# Patient Record
Sex: FEMALE | Race: WHITE | NOT HISPANIC OR LATINO | Employment: OTHER | URBAN - METROPOLITAN AREA
[De-identification: names, ages, dates, MRNs, and addresses within clinical notes are randomized per-mention and may not be internally consistent; named-entity substitution may affect disease eponyms.]

---

## 2017-10-09 ENCOUNTER — HOSPITAL ENCOUNTER (OUTPATIENT)
Dept: MAMMOGRAPHY | Facility: HOSPITAL | Age: 67
Discharge: HOME | End: 2017-10-09

## 2017-10-09 DIAGNOSIS — Z12.31 VISIT FOR SCREENING MAMMOGRAM: ICD-10-CM

## 2017-10-09 LAB — MAMMOGRAM: NORMAL

## 2018-05-09 ENCOUNTER — OFFICE VISIT (OUTPATIENT)
Dept: ORTHOPEDICS | Facility: CLINIC | Age: 68
End: 2018-05-09
Payer: COMMERCIAL

## 2018-05-09 ENCOUNTER — RADIANT APPOINTMENT (OUTPATIENT)
Dept: GENERAL RADIOLOGY | Facility: CLINIC | Age: 68
End: 2018-05-09
Attending: FAMILY MEDICINE
Payer: COMMERCIAL

## 2018-05-09 VITALS — WEIGHT: 175 LBS | BODY MASS INDEX: 33.04 KG/M2 | HEIGHT: 61 IN

## 2018-05-09 DIAGNOSIS — M54.50 CHRONIC MIDLINE LOW BACK PAIN WITHOUT SCIATICA: Primary | ICD-10-CM

## 2018-05-09 DIAGNOSIS — M54.50 CHRONIC MIDLINE LOW BACK PAIN WITHOUT SCIATICA: ICD-10-CM

## 2018-05-09 DIAGNOSIS — G89.29 CHRONIC MIDLINE LOW BACK PAIN WITHOUT SCIATICA: Primary | ICD-10-CM

## 2018-05-09 DIAGNOSIS — G89.29 CHRONIC MIDLINE LOW BACK PAIN WITHOUT SCIATICA: ICD-10-CM

## 2018-05-09 RX ORDER — HYDROCODONE BITARTRATE AND ACETAMINOPHEN 5; 325 MG/1; MG/1
2 TABLET ORAL EVERY 6 HOURS PRN
Qty: 30 TABLET | Refills: 0 | Status: SHIPPED | OUTPATIENT
Start: 2018-05-09 | End: 2019-07-09

## 2018-05-09 RX ORDER — DICLOFENAC SODIUM 75 MG/1
75 TABLET, DELAYED RELEASE ORAL 2 TIMES DAILY PRN
Qty: 90 TABLET | Refills: 1 | Status: SHIPPED | OUTPATIENT
Start: 2018-05-09 | End: 2018-09-20

## 2018-05-09 NOTE — MR AVS SNAPSHOT
"              After Visit Summary   5/9/2018    Renate Tanner    MRN: [id number]           Patient Information     Date Of Birth          1950        Visit Information        Provider Department      5/9/2018 1:20 PM Cathy Jaramillo MD [facility]        Today's Diagnoses     Chronic midline low back pain without sciatica    -  1       Follow-ups after your visit        Who to contact     Please call your clinic at [phone] to:    Ask questions about your health    Make or cancel appointments    Discuss your medicines    Learn about your test results    Speak to your doctor            Additional Information About Your Visit        MyChart Information     [employer] gives you secure access to your electronic health record. If you see a primary care provider, you can also send messages to your care team and make appointments. If you have questions, please call your primary care clinic.  If you do not have a primary care provider, please call [phone] and they will assist you.      [employer] is an electronic gateway that provides easy, online access to your medical records. With [employer], you can request a clinic appointment, read your test results, renew a prescription or communicate with your care team.     To access your existing account, please contact your [facility] or call [phone] for assistance.        Care EveryWhere ID     This is your Care EveryWhere ID. This could be used by other organizations to access your [place] medical records  [id number]        Your Vitals Were     Height BMI (Body Mass Index)                5' 1\" (1.549 m) 33.07 kg/m2           Blood Pressure from Last 3 Encounters:   06/09/15 133/86    Weight from Last 3 Encounters:   05/09/18 175 lb (79.4 kg)   11/28/16 175 lb (79.4 kg)   06/09/15 175 lb (79.4 kg)                 Today's Medication Changes          These changes are accurate as of 5/9/18 11:59 PM.  If you have " any questions, ask your nurse or doctor.               Start taking these medicines.        Dose/Directions    diclofenac 75 MG EC tablet   Commonly known as:  VOLTAREN   Used for:  Chronic midline low back pain without sciatica   Started by:  Cathy Jaramillo MD        Dose:  75 mg   Take 1 tablet (75 mg) by mouth 2 times daily as needed for moderate pain   Quantity:  90 tablet   Refills:  1       HYDROcodone-acetaminophen 5-325 MG per tablet   Commonly known as:  NORCO   Used for:  Chronic midline low back pain without sciatica   Started by:  Cathy Jaramillo MD        Dose:  2 tablet   Take 2 tablets by mouth every 6 hours as needed for severe pain   Quantity:  30 tablet   Refills:  0         Stop taking these medicines if you haven't already. Please contact your care team if you have questions.     meloxicam 7.5 MG tablet   Commonly known as:  MOBIC   Stopped by:  Cathy Jaramillo MD                Where to get your medicines      These medications were sent to [facility] [address] [place], [place] [address] [facility]  [address] [facility] [address]     Phone:  [phone]     diclofenac 75 MG EC tablet         Some of these will need a paper prescription and others can be bought over the counter.  Ask your nurse if you have questions.     Bring a paper prescription for each of these medications     HYDROcodone-acetaminophen 5-325 MG per tablet               Information about OPIOIDS     PRESCRIPTION OPIOIDS: WHAT YOU NEED TO KNOW   You have a prescription for an opioid (narcotic) pain medicine. Opioids can cause addiction. If you have a history of chemical dependency of any type, you are at a higher risk of becoming addicted to opioids. Only take this medicine after all other options have been tried. Take it for as short a time and as few doses as possible.     Do not:    Drive. If you drive while taking these medicines, you could be arrested for driving under the  influence (DUI).    Operate heavy machinery    Do any other dangerous activities while taking these medicines.     Drink any alcohol while taking these medicines.      Take with any other medicines that contain acetaminophen. Read all labels carefully. Look for the word  acetaminophen  or  Tylenol.  Ask your pharmacist if you have questions or are unsure.    Store your pills in a secure place, locked if possible. We will not replace any lost or stolen medicine. If you don t finish your medicine, please throw away (dispose) as directed by your pharmacist. The [place] Pollution Control Agency has more information about safe disposal: https://www.pca.[facility].[place].us/living-green/managing-unwanted-medications    All opioids tend to cause constipation. Drink plenty of water and eat foods that have a lot of fiber, such as fruits, vegetables, prune juice, apple juice and high-fiber cereal. Take a laxative (Miralax, milk of magnesia, Colace, Senna) if you don t move your bowels at least every other day.          Primary Care Provider    None Specified       No primary provider on file.        Equal Access to Services     BERNARD MORTON : Hadii jaya driver hadasho Sopascualali, waaxda luqadaha, qaybta kaalmada adetaurusyajluis, asuncion dykes . So [facility] [phone].    ATENCIÓN: Si habla español, tiene a block disposición servicios gratuitos de asistencia lingüística. Llame al [phone].    We comply with applicable federal civil rights laws and [place] laws. We do not discriminate on the basis of race, color, national origin, age, disability, sex, sexual orientation, or gender identity.            Thank you!     Thank you for choosing [facility]  for your care. Our goal is always to provide you with excellent care. Hearing back from our patients is one way we can continue to improve our services. Please take a few minutes to complete the written survey that you may receive in the mail after your visit  with us. Thank you!             Your Updated Medication List - Protect others around you: Learn how to safely use, store and throw away your medicines at www.disposemymeds.org.          This list is accurate as of 5/9/18 11:59 PM.  Always use your most recent med list.                   Brand Name Dispense Instructions for use Diagnosis    ALLEGRA ALLERGY 180 MG tablet   Generic drug:  fexofenadine      Take 180 mg by mouth daily        ascorbic acid 1000 MG Tabs    vitamin C     Take 1,000 mg by mouth daily        aspirin 81 MG tablet      Take by mouth daily        atenolol 50 MG tablet    TENORMIN     Take 50 mg by mouth daily        Biotin 5000 MCG Tabs           CALCIUM 600+D PLUS MINERALS PO      Take 1 tablet by mouth daily        cyclobenzaprine 10 MG tablet    FLEXERIL    42 tablet    Take 1 tablet (10 mg) by mouth 3 times daily as needed for muscle spasms    Disc disease, degenerative, lumbar or lumbosacral       diclofenac 75 MG EC tablet    VOLTAREN    90 tablet    Take 1 tablet (75 mg) by mouth 2 times daily as needed for moderate pain    Chronic midline low back pain without sciatica       fish Oil 1200 MG capsule      360 mg omega-3's        FLUTICASONE FUROATE IN           GLUCOSAMINE-CHONDROITIN PO      1500-1200mg        HYDROcodone-acetaminophen 5-325 MG per tablet    NORCO    30 tablet    Take 2 tablets by mouth every 6 hours as needed for severe pain    Chronic midline low back pain without sciatica       IRON PO      Take 1 tablet by mouth every other day        METRONIDAZOLE EX      Externally apply topically daily        MULTIVITAMINS PO      Take 1 tablet by mouth daily        traZODone 50 MG tablet    DESYREL

## 2018-05-09 NOTE — PROGRESS NOTES
"HPI: SUBJECTIVE:  Renate is a very active 67-year-old female patient with a longstanding history of low back pain due to DDD.  I haven't seen her since 11/2016 .  She has had years of low back pain.  She underwent a spinal fusion at L3-4 on 08/11/2011.  This helped her pain some but did not take away the numbness that radiates to her right leg and knee.  Overall, her back seems to be worsening since we saw her last.  She used Mobic for a while but that didn't help as much as she would like.  She has been to PT and that was helpful.  She does her best to continue to be active.  Ibuprofen helps some.  She has also tried Celebrex and flexeril in the past without benefit.   KRISHNA;  CDI July 2015; minimal benefit by Dr. Leung   Hard to stand still or in line    Current Outpatient Prescriptions   Medication     ascorbic acid (VITAMIN C) 1000 MG TABS     aspirin 81 MG tablet     atenolol (TENORMIN) 50 MG tablet     Biotin 5000 MCG TABS     Calcium Carbonate-Vit D-Min (CALCIUM 600+D PLUS MINERALS PO)     cyclobenzaprine (FLEXERIL) 10 MG tablet     diclofenac (VOLTAREN) 75 MG EC tablet     fexofenadine (ALLEGRA ALLERGY) 180 MG tablet     FLUTICASONE FUROATE IN     GLUCOSAMINE-CHONDROITIN PO     HYDROcodone-acetaminophen (NORCO) 5-325 MG per tablet     IRON PO     METRONIDAZOLE EX     Multiple Vitamin (MULTIVITAMINS PO)     Omega-3 Fatty Acids (FISH OIL) 1200 MG CAPS     traZODone (DESYREL) 50 MG tablet     No current facility-administered medications for this visit.          PAST MEDICAL HISTORY:  Hypertension  Seasonal allergies  Surg; tubal  L3-4 fusion 2011  SOCIAL HISTORY:  She is , and she lives alone. She is fully retired    O: NAD  Ht 5' 1\" (1.549 m)  Wt 175 lb (79.4 kg)  BMI 33.07 kg/m2    OBJECTIVE:  The patient is accompanied today by her daughter, Frida.  She tends to have a rounded shoulder posture, some of this is correctible with postural cueing.     Lumbar spine:  She is able to flex to about 45 degrees " before she experiences pain.  She has a well-healed surgical scar posteriorly on the lumbar spine.  Extension is limited and it comes from about the thoracic spine upward.  She really does not move the lower lumbar segment.  Side bending is limited but is pain free.  She does have pain with extension and flexion.  She is tender to palpation over her paraspinal muscles, right greater than left.  She is tender over her SI joints bilaterally, negative sacral thrust.  Negative SHARLA testing bilaterally.  Negative straight leg raise testing, negative slump testing.  Sciatic notch is nontender to palpation.  She does have decreased sensation along the lateral aspect of the right thigh.  Otherwise, her sensation is intact.  She is able heel and toe walk without difficulty.     Reflexes:  Patellar reflexes are 1+ with distraction maneuvers and equal bilaterally.  Ankle jerks are 2+ and equal bilaterally.         2 views lumbar spine radiographs     History: Low back pain     Comparison: Outside MRI from June 22, 2015.     Findings:     EXAMINATION: XR LUMBAR SPINE G/E 4 VW  5/9/2018 2:19 PM      CLINICAL HISTORY:  Chronic low back; AP, lateral and obliques; Chronic  midline low back pain without sciatica; Chronic midline low back pain  without sciatica      COMPARISON: 11/28/2016     FINDINGS: AP, lateral and oblique views of the lumbar spine were  obtained. For the purpose of this dictation 5 lumbar type vertebral  bodies are presumed. There are short ribs at the lowest thoracic  vertebral body level. Redemonstration of the combined anterior and  posterior instrumentation involving the L3-L4 vertebral body level.  Unchanged mild retrolisthesis of L2 on L3 and L4 on L5. Multilevel  disc space narrowing most pronounced at the level of L1-L2, L2-L3 and  L5-S1. There is mild progression of disc space narrowing.     No definite evidence of pars defect.         IMPRESSION:   1. Stable postsurgical changes of L3-L4 combined  anterior and  posterior instrumented fusion.  2. Mildly progressed disc space narrowing and early vacuum disc  phenomenon most pronounced at the levels of L1-L2, L2-L3 and L5-S1.  3. Persistent very mild retrolisthesis of L2 on L3 and L4 on L5.     JUTTA ELLERMANN, MD    ASSESSMENT:  This is very active and pleasant 67-year-old female with an L3-4 fusion done in 2011 that appears stable but who shows severe degenerative disk disease at L1-3 and more mild changes at L5-S1 but the DDD has progressed since her last xrays in 2016.         PLAN:  We have discussed her x-ray findings.    Today, I would like to prescribe her Voltaren 75 mg 1 p.o. b.i.d. with meals p.r.n.  Avoid other NSAIDS while on this medication.  Discussed side effects.  She also requested a small prescription of Norco for when her symptoms are particularly bad.  I have prescribed 5mg Norco #30. This medication has helped her in the past and she would use it sparingly.  I have reminded her of sedation and other side effects.  She will restart her PT exercises. Consider MRI of the lumbar spine and possible KRISHNA if not improving. She would like to avoid having a new MRI and any injections if possible.      Cathy Jaramillo MD, CAQ, FACSM, CCD  [facility]  Sports Medicine and Bone Health  Team Physician;  Athletics

## 2018-05-09 NOTE — LETTER
"  5/9/2018      RE: Renate Tanner  [address] [facility] [address]       HPI: SUBJECTIVE:  Renate is a very active 67-year-old female patient with a longstanding history of low back pain due to DDD.  I haven't seen her since 11/2016 .  She has had years of low back pain.  She underwent a spinal fusion at L3-4 on 08/11/2011.  This helped her pain some but did not take away the numbness that radiates to her right leg and knee.  Overall, her back seems to be worsening since we saw her last.  She used Mobic for a while but that didn't help as much as she would like.  She has been to PT and that was helpful.  She does her best to continue to be active.  Ibuprofen helps some.  She has also tried Celebrex and flexeril in the past without benefit.   KRISHNA;  CDI July 2015; minimal benefit by Dr. Leung   Hard to stand still or in line    Current Outpatient Prescriptions   Medication     ascorbic acid (VITAMIN C) 1000 MG TABS     aspirin 81 MG tablet     atenolol (TENORMIN) 50 MG tablet     Biotin 5000 MCG TABS     Calcium Carbonate-Vit D-Min (CALCIUM 600+D PLUS MINERALS PO)     cyclobenzaprine (FLEXERIL) 10 MG tablet     diclofenac (VOLTAREN) 75 MG EC tablet     fexofenadine (ALLEGRA ALLERGY) 180 MG tablet     FLUTICASONE FUROATE IN     GLUCOSAMINE-CHONDROITIN PO     HYDROcodone-acetaminophen (NORCO) 5-325 MG per tablet     IRON PO     METRONIDAZOLE EX     Multiple Vitamin (MULTIVITAMINS PO)     Omega-3 Fatty Acids (FISH OIL) 1200 MG CAPS     traZODone (DESYREL) 50 MG tablet     No current facility-administered medications for this visit.          PAST MEDICAL HISTORY:  Hypertension  Seasonal allergies  Surg; tubal  L3-4 fusion 2011  SOCIAL HISTORY:  She is , and she lives alone. She is fully retired    O: NAD  Ht 5' 1\" (1.549 m)  Wt 175 lb (79.4 kg)  BMI 33.07 kg/m2    OBJECTIVE:  The patient is accompanied today by her daughter, Frida.  She tends to have a rounded shoulder posture, some of this is " correctible with postural cueing.     Lumbar spine:  She is able to flex to about 45 degrees before she experiences pain.  She has a well-healed surgical scar posteriorly on the lumbar spine.  Extension is limited and it comes from about the thoracic spine upward.  She really does not move the lower lumbar segment.  Side bending is limited but is pain free.  She does have pain with extension and flexion.  She is tender to palpation over her paraspinal muscles, right greater than left.  She is tender over her SI joints bilaterally, negative sacral thrust.  Negative SHARLA testing bilaterally.  Negative straight leg raise testing, negative slump testing.  Sciatic notch is nontender to palpation.  She does have decreased sensation along the lateral aspect of the right thigh.  Otherwise, her sensation is intact.  She is able heel and toe walk without difficulty.     Reflexes:  Patellar reflexes are 1+ with distraction maneuvers and equal bilaterally.  Ankle jerks are 2+ and equal bilaterally.         2 views lumbar spine radiographs     History: Low back pain     Comparison: Outside MRI from June 22, 2015.     Findings:     EXAMINATION: XR LUMBAR SPINE G/E 4 VW  5/9/2018 2:19 PM      CLINICAL HISTORY:  Chronic low back; AP, lateral and obliques; Chronic  midline low back pain without sciatica; Chronic midline low back pain  without sciatica      COMPARISON: 11/28/2016     FINDINGS: AP, lateral and oblique views of the lumbar spine were  obtained. For the purpose of this dictation 5 lumbar type vertebral  bodies are presumed. There are short ribs at the lowest thoracic  vertebral body level. Redemonstration of the combined anterior and  posterior instrumentation involving the L3-L4 vertebral body level.  Unchanged mild retrolisthesis of L2 on L3 and L4 on L5. Multilevel  disc space narrowing most pronounced at the level of L1-L2, L2-L3 and  L5-S1. There is mild progression of disc space narrowing.     No definite evidence  of pars defect.         IMPRESSION:   1. Stable postsurgical changes of L3-L4 combined anterior and  posterior instrumented fusion.  2. Mildly progressed disc space narrowing and early vacuum disc  phenomenon most pronounced at the levels of L1-L2, L2-L3 and L5-S1.  3. Persistent very mild retrolisthesis of L2 on L3 and L4 on L5.     JUTTA ELLERMANN, MD    ASSESSMENT:  This is very active and pleasant 67-year-old female with an L3-4 fusion done in 2011 that appears stable but who shows severe degenerative disk disease at L1-3 and more mild changes at L5-S1 but the DDD has progressed since her last xrays in 2016.         PLAN:  We have discussed her x-ray findings.    Today, I would like to prescribe her Voltaren 75 mg 1 p.o. b.i.d. with meals p.r.n.  Avoid other NSAIDS while on this medication.  Discussed side effects.  She also requested a small prescription of Norco for when her symptoms are particularly bad.  I have prescribed 5mg Norco #30. This medication has helped her in the past and she would use it sparingly.  I have reminded her of sedation and other side effects.  She will restart her PT exercises. Consider MRI of the lumbar spine and possible KRISHNA if not improving. She would like to avoid having a new MRI and any injections if possible.      Cathy Jaramillo MD, CAQ, FACSM, CCD  [facility]  Sports Medicine and Bone Health  Team Physician;  Athletics                                                                 Cathy Jaramillo MD

## 2018-09-17 DIAGNOSIS — M54.50 CHRONIC MIDLINE LOW BACK PAIN WITHOUT SCIATICA: ICD-10-CM

## 2018-09-17 DIAGNOSIS — G89.29 CHRONIC MIDLINE LOW BACK PAIN WITHOUT SCIATICA: ICD-10-CM

## 2018-09-17 RX ORDER — DICLOFENAC SODIUM 75 MG/1
75 TABLET, DELAYED RELEASE ORAL 2 TIMES DAILY PRN
Qty: 90 TABLET | Refills: 1 | Status: CANCELLED | OUTPATIENT
Start: 2018-09-17

## 2018-09-17 RX ORDER — HYDROCODONE BITARTRATE AND ACETAMINOPHEN 5; 325 MG/1; MG/1
2 TABLET ORAL EVERY 6 HOURS PRN
Qty: 30 TABLET | Refills: 0 | Status: CANCELLED | OUTPATIENT
Start: 2018-09-17

## 2018-09-17 NOTE — TELEPHONE ENCOUNTER
diclofenac (VOLTAREN) 75 MG EC tablet      Last Written Prescription Date:  5/9/18  Last Fill Quantity: 90,   # refills: 1  Routing refill request to provider for review/approval because:  Drug not on the Ortho refill protocol.    HYDROcodone-acetaminophen (NORCO) 5-325 MG per tablet      Last Written Prescription Date:  5/9/18  Last Fill Quantity: 30,   # refills: 0  Routing refill request to provider for review/approval because:  Controlled substance.    Last Office Visit : 5/9/18  Future Office visit:  none

## 2018-09-20 DIAGNOSIS — M54.50 CHRONIC MIDLINE LOW BACK PAIN WITHOUT SCIATICA: ICD-10-CM

## 2018-09-20 DIAGNOSIS — G89.29 CHRONIC MIDLINE LOW BACK PAIN WITHOUT SCIATICA: ICD-10-CM

## 2018-09-20 RX ORDER — DICLOFENAC SODIUM 75 MG/1
75 TABLET, DELAYED RELEASE ORAL 2 TIMES DAILY PRN
Qty: 90 TABLET | Refills: 1 | Status: SHIPPED | OUTPATIENT
Start: 2018-09-20 | End: 2019-07-09

## 2018-09-27 NOTE — TELEPHONE ENCOUNTER
Renate Tanner   to Cathy Jaramillo MD           9/20/18 4:00 PM   Thank you for getting back to me. I will wait to hear from [facility] re: the diclofenac. I understand on the other. I will make an appointment with you when I get back from my trip.   Thanks again-   Renate Jaramillo, Cathy Thomas MD   to Renate Tanner           9/20/18 3:14 PM   Hi Renate,   You will have to come in for an appointment for me to prescribe more Norco since it is a narcotic.  I will refill the Diclofenac.       Cathy Jaramillo MD, CAQ, FACSM, CCD   [facility]   Sports Medicine and Bone Health   Team Physician;  Athletics      Last read by Renate Tanner at 3:57 PM on 9/20/2018.

## 2018-11-05 ENCOUNTER — PATIENT OUTREACH (OUTPATIENT)
Dept: CARE COORDINATION | Facility: CLINIC | Age: 68
End: 2018-11-05

## 2018-11-07 ENCOUNTER — OFFICE VISIT (OUTPATIENT)
Dept: ORTHOPEDICS | Facility: CLINIC | Age: 68
End: 2018-11-07
Payer: COMMERCIAL

## 2018-11-07 VITALS — WEIGHT: 178.3 LBS | HEIGHT: 61 IN | BODY MASS INDEX: 33.66 KG/M2 | RESPIRATION RATE: 16 BRPM

## 2018-11-07 DIAGNOSIS — M51.369 LUMBAR DEGENERATIVE DISC DISEASE: Primary | ICD-10-CM

## 2018-11-07 RX ORDER — HYDROCODONE BITARTRATE AND ACETAMINOPHEN 5; 325 MG/1; MG/1
2 TABLET ORAL EVERY 4 HOURS PRN
Qty: 30 TABLET | Refills: 0 | Status: SHIPPED | OUTPATIENT
Start: 2018-11-07 | End: 2023-09-05

## 2018-11-07 NOTE — PROGRESS NOTES
SUBJECTIVE:      Interval Hx: 68 yo female here for f/u of multilevel lumbar DDD. Here symptoms are worsening and she has painful Right lateral thigh pain without significant numbness or tingling.  Denies radiation past her knee.  Her LBP is increasing.  She had a previous L3-4 fusion in 2011 and thinks maybe she should have another fusion.  Requesting a refill of a small amount of Norco.  I gave her a prescription for #30 in May 2018.  Using diclofenac EC 75 mg and this helps some.  She called her surgeon who indicated that he would need her to have a MRI with contrast prior to seeing her.       Previous Hx from May 2018 appt:  Renate is a very active 67-year-old female patient with a longstanding history of low back pain due to DDD.  I haven't seen her since 11/2016 .  She has had years of low back pain.  She underwent a spinal fusion at L3-4 on 08/11/2011.  This helped her pain some but did not take away the numbness that radiates to her right leg and knee.  Overall, her back seems to be worsening since we saw her last.  She used Mobic for a while but that didn't help as much as she would like.  She has been to PT and that was helpful.  She does her best to continue to be active.  Ibuprofen helps some.  She has also tried Celebrex and flexeril in the past without benefit.   KRISHNA;  CDI July 2015 R L4 Transforminal per the note on the CDI website; minimal benefit by Dr. Leung   Hard to stand still or in line  Current Outpatient Prescriptions   Medication     ascorbic acid (VITAMIN C) 1000 MG TABS     aspirin 81 MG tablet     atenolol (TENORMIN) 50 MG tablet     Biotin 5000 MCG TABS     Calcium Carbonate-Vit D-Min (CALCIUM 600+D PLUS MINERALS PO)     diclofenac (VOLTAREN) 75 MG EC tablet     fexofenadine (ALLEGRA ALLERGY) 180 MG tablet     FLUTICASONE FUROATE IN     GLUCOSAMINE-CHONDROITIN PO     HYDROcodone-acetaminophen (NORCO) 5-325 MG per tablet     HYDROcodone-acetaminophen (NORCO) 5-325 MG per tablet      METRONIDAZOLE EX     Multiple Vitamin (MULTIVITAMINS PO)     Omega-3 Fatty Acids (FISH OIL) 1200 MG CAPS     traZODone (DESYREL) 50 MG tablet     cyclobenzaprine (FLEXERIL) 10 MG tablet     IRON PO     No current facility-administered medications for this visit.      O: NAD  There were no vitals taken for this visit.    OBJECTIVE:  The patient is accompanied today by her daughter, Frida.    Lumbar spine:  She is able to flex to about 45 degrees before she experiences pain.  She has a well-healed surgical scar posteriorly on the lumbar spine.  Extension is limited and it comes from about the thoracic spine upward.  She really does not move the lower lumbar segment.  Side bending is limited but is pain free.  She does have pain with extension and flexion.  She is nontender to palpation over her paraspinal muscles. + Slump on the RIGHT causing the RIGHT lateral thigh pain.  She has some mild RIGHT S1 weakness compared to the LEFT otherwise her strength in the B LE is 5/5. She can rise up on her toes and and lift up her feet to be on her heels bilaterally with some balance assistance.     ASSESSMENT:  This is very active and pleasant 67-year-old female with an L3-4 fusion done in 2011 that appears stable but who shows severe degenerative disk disease at L1-3 and moderate changes at L5-S1 with increasing symptoms of R lateral thigh radicular leg pain and R S1 weakness.      PLAN:  She has been taking diclofenac which helps and doesn't cause side effects.  She also requested a small prescription of Norco for when her symptoms are particularly bad.  I have prescribed 5mg Norco #30. This medication has helped her in the past and she would use it sparingly.  After lengthy discussion and reviewing her MRI and her symptoms and exam in detail, I have recommended an interlaminar RIGHT L5-S1 KRISHNA.  She will let me know if that helps 2 weeks following the injection.  If not, we will have her get an RIGHT L3-4 interlaminar KRISHNA and  if that doesn't help she will return to her spine surgeon, who wants a MRI with contrast prior to seeing her. We have discussed that she would likely need a multi-level fusion procedure given the results of her latest MRIs. She is trying to get these things done before the end of the year due to an insurance change.     > 25 min of total time spent in one-on-one evalution and discussion with patient regarding nature of problem, course, prior treatments, and therapeutic options,> 50% of which was spent in counseling and coordination of care:       Cathy Jaramillo MD, CAQ, FACSM, CCD  [facility]  Sports Medicine and Bone Health  Team Physician;  Athletics

## 2018-11-07 NOTE — LETTER
11/7/2018      RE: Renate Tanner  [address] [address]       SUBJECTIVE:      Interval Hx: 68 yo female here for f/u of multilevel lumbar DDD. Here symptoms are worsening and she has painful Right lateral thigh pain without significant numbness or tingling.  Denies radiation past her knee.  Her LBP is increasing.  She had a previous L3-4 fusion in 2011 and thinks maybe she should have another fusion.  Requesting a refill of a small amount of Norco.  I gave her a prescription for #30 in May 2018.  Using diclofenac EC 75 mg and this helps some.  She called her surgeon who indicated that he would need her to have a MRI with contrast prior to seeing her.       Previous Hx from May 2018 appt:  Renate is a very active 67-year-old female patient with a longstanding history of low back pain due to DDD.  I haven't seen her since 11/2016 .  She has had years of low back pain.  She underwent a spinal fusion at L3-4 on 08/11/2011.  This helped her pain some but did not take away the numbness that radiates to her right leg and knee.  Overall, her back seems to be worsening since we saw her last.  She used Mobic for a while but that didn't help as much as she would like.  She has been to PT and that was helpful.  She does her best to continue to be active.  Ibuprofen helps some.  She has also tried Celebrex and flexeril in the past without benefit.   KRISHNA;  CDI July 2015 R L4 Transforminal per the note on the CDI website; minimal benefit by Dr. Leung   Hard to stand still or in line  Current Outpatient Prescriptions   Medication     ascorbic acid (VITAMIN C) 1000 MG TABS     aspirin 81 MG tablet     atenolol (TENORMIN) 50 MG tablet     Biotin 5000 MCG TABS     Calcium Carbonate-Vit D-Min (CALCIUM 600+D PLUS MINERALS PO)     diclofenac (VOLTAREN) 75 MG EC tablet     fexofenadine (ALLEGRA ALLERGY) 180 MG tablet     FLUTICASONE FUROATE IN     GLUCOSAMINE-CHONDROITIN PO     HYDROcodone-acetaminophen (NORCO)  5-325 MG per tablet     HYDROcodone-acetaminophen (NORCO) 5-325 MG per tablet     METRONIDAZOLE EX     Multiple Vitamin (MULTIVITAMINS PO)     Omega-3 Fatty Acids (FISH OIL) 1200 MG CAPS     traZODone (DESYREL) 50 MG tablet     cyclobenzaprine (FLEXERIL) 10 MG tablet     IRON PO     No current facility-administered medications for this visit.      O: NAD  There were no vitals taken for this visit.    OBJECTIVE:  The patient is accompanied today by her daughter, Frida.    Lumbar spine:  She is able to flex to about 45 degrees before she experiences pain.  She has a well-healed surgical scar posteriorly on the lumbar spine.  Extension is limited and it comes from about the thoracic spine upward.  She really does not move the lower lumbar segment.  Side bending is limited but is pain free.  She does have pain with extension and flexion.  She is nontender to palpation over her paraspinal muscles. + Slump on the RIGHT causing the RIGHT lateral thigh pain.  She has some mild RIGHT S1 weakness compared to the LEFT otherwise her strength in the B LE is 5/5. She can rise up on her toes and and lift up her feet to be on her heels bilaterally with some balance assistance.     ASSESSMENT:  This is very active and pleasant 67-year-old female with an L3-4 fusion done in 2011 that appears stable but who shows severe degenerative disk disease at L1-3 and moderate changes at L5-S1 with increasing symptoms of R lateral thigh radicular leg pain and R S1 weakness.      PLAN:  She has been taking diclofenac which helps and doesn't cause side effects.  She also requested a small prescription of Norco for when her symptoms are particularly bad.  I have prescribed 5mg Norco #30. This medication has helped her in the past and she would use it sparingly.  After lengthy discussion and reviewing her MRI and her symptoms and exam in detail, I have recommended an interlaminar RIGHT L5-S1 KRISHNA.  She will let me know if that helps 2 weeks  following the injection.  If not, we will have her get an RIGHT L3-4 interlaminar KRISHNA and if that doesn't help she will return to her spine surgeon, who wants a MRI with contrast prior to seeing her. We have discussed that she would likely need a multi-level fusion procedure given the results of her latest MRIs. She is trying to get these things done before the end of the year due to an insurance change.     > 25 min of total time spent in one-on-one evalution and discussion with patient regarding nature of problem, course, prior treatments, and therapeutic options,> 50% of which was spent in counseling and coordination of care:       Cathy Jaramillo MD, CAQ, FACSM, CCD  [facility]  Sports Medicine and Bone Health  Team Physician;  Athletics

## 2018-11-07 NOTE — MR AVS SNAPSHOT
After Visit Summary   11/7/2018    Renate Tanner    MRN: [id number]           Patient Information     Date Of Birth          1950        Visit Information        Provider Department      11/7/2018 1:20 PM Cathy Jaramillo MD [facility] Sports Medicine        Today's Diagnoses     Lumbar degenerative disc disease    -  1       Follow-ups after your visit        Your next 10 appointments already scheduled     Nov 09, 2018  1:00 PM CST   XR LUMBAR EPIDURAL INJECTION with UCXR3,  IMAGING NURSE, BRIT NEURO RAD   [facility] ([facility])    [address] [place] [id number] [phone]           How do I prepare for my exam? (Food and drink instructions) No Food and Drink Restrictions.  How do I prepare for my exam? (Other instructions) * If you take Coumadin (or any other blood thinners) you may need to stop taking them up to 5 days prior to the exam. Talk to your doctor before stopping. * If you take Plavix, Ticlid, Pletal or Persantine, please ask your doctor if you should stop these medicines. You may need extra tests on the morning of your scan. * If you take Xarelto (Rivaroxaban), you may need to stop taking it 24 hours before treatment. Talk to your doctor before stopping this medicine.  What should I wear: Wear comfortable clothes.  How long does the exam take: Injections take about 30 to 45 minutes. Most people spend up to 2 hours in the clinic or hospital.  What should I bring: Please bring any scans or X-rays taken at other hospitals, if similar tests were done. Also bring a list of your medicines, including vitamins, minerals and over-the-counter drugs. It is safest to leave personal items at home. You will need a  : Plan to have someone drive you home afterward.  What do I need to tell my doctor: Tell your doctor in advance: * If you are allergic to X-ray dye (contrast fluid). * If you may be pregnant.   What should I do after the exam: You may have slight cramping during this exam. The cramps should go away afterward. You may have some spotting after the exam.  What is this test: A spinal shot is done in or near the spine. You may receive steroid medicine (to reduce swelling) or contrast fluid (dye that makes the area show up more clearly on X-rays). A nerve root shot is a shot into the nerve near the spine. It may reduce inflammation near the nerve root or spinal cord and reduce pain in the arm or leg.  Who should I call with questions: If you have any questions, please call the Imaging Department where you will have your exam. Directions, parking instructions, and other information is available on our website, [email or web address].              Future tests that were ordered for you today     Open Future Orders        Priority Expected Expires Ordered    XR Lumbar Epidural Injection Incl Imaging Routine 11/7/2018 11/7/2019 11/7/2018            Who to contact     Please call your clinic at [phone] to:    Ask questions about your health    Make or cancel appointments    Discuss your medicines    Learn about your test results    Speak to your doctor            Additional Information About Your Visit        [facility] Information     [employer] gives you secure access to your electronic health record. If you see a primary care provider, you can also send messages to your care team and make appointments. If you have questions, please call your primary care clinic.  If you do not have a primary care provider, please call [phone] and they will assist you.      [employer] is an electronic gateway that provides easy, online access to your medical records. With [employer], you can request a clinic appointment, read your test results, renew a prescription or communicate with your care team.     To access your existing account, please contact your [facility] or call [phone] for assistance.    "     Care EveryWhere ID     This is your Care EveryWhere ID. This could be used by other organizations to access your [place] medical records  [id number]        Your Vitals Were     Respirations Height BMI (Body Mass Index)             16 1.549 m (5' 1\") 33.69 kg/m2          Blood Pressure from Last 3 Encounters:   06/09/15 133/86    Weight from Last 3 Encounters:   11/07/18 80.9 kg (178 lb 4.8 oz)   05/09/18 79.4 kg (175 lb)   11/28/16 79.4 kg (175 lb)                 Today's Medication Changes          These changes are accurate as of 11/7/18  3:17 PM.  If you have any questions, ask your nurse or doctor.               These medicines have changed or have updated prescriptions.        Dose/Directions    * HYDROcodone-acetaminophen 5-325 MG per tablet   Commonly known as:  NORCO   This may have changed:  Another medication with the same name was added. Make sure you understand how and when to take each.   Used for:  Chronic midline low back pain without sciatica   Changed by:  Cathy Jaramillo MD        Dose:  2 tablet   Take 2 tablets by mouth every 6 hours as needed for severe pain   Quantity:  30 tablet   Refills:  0       * HYDROcodone-acetaminophen 5-325 MG per tablet   Commonly known as:  NORCO   This may have changed:  You were already taking a medication with the same name, and this prescription was added. Make sure you understand how and when to take each.   Used for:  Lumbar degenerative disc disease   Changed by:  Cathy Jaramillo MD        Dose:  2 tablet   Take 2 tablets by mouth every 4 hours as needed for pain   Quantity:  30 tablet   Refills:  0       * Notice:  This list has 2 medication(s) that are the same as other medications prescribed for you. Read the directions carefully, and ask your doctor or other care provider to review them with you.         Where to get your medicines      Some of these will need a paper prescription and others can be bought over the " counter.  Ask your nurse if you have questions.     Bring a paper prescription for each of these medications     HYDROcodone-acetaminophen 5-325 MG per tablet               Information about OPIOIDS     PRESCRIPTION OPIOIDS: WHAT YOU NEED TO KNOW   We gave you an opioid (narcotic) pain medicine. It is important to manage your pain, but opioids are not always the best choice. You should first try all the other options your care team gave you. Take this medicine for as short a time (and as few doses) as possible.    Some activities can increase your pain, such as bandage changes or therapy sessions. It may help to take your pain medicine 30 to 60 minutes before these activities. Reduce your stress by getting enough sleep, working on hobbies you enjoy and practicing relaxation or meditation. Talk to your care team about ways to manage your pain beyond prescription opioids.    These medicines have risks:    DO NOT drive when on new or higher doses of pain medicine. These medicines can affect your alertness and reaction times, and you could be arrested for driving under the influence (DUI). If you need to use opioids long-term, talk to your care team about driving.    DO NOT operate heavy machinery    DO NOT do any other dangerous activities while taking these medicines.    DO NOT drink any alcohol while taking these medicines.     If the opioid prescribed includes acetaminophen, DO NOT take with any other medicines that contain acetaminophen. Read all labels carefully. Look for the word  acetaminophen  or  Tylenol.  Ask your pharmacist if you have questions or are unsure.    You can get addicted to pain medicines, especially if you have a history of addiction (chemical, alcohol or substance dependence). Talk to your care team about ways to reduce this risk.    All opioids tend to cause constipation. Drink plenty of water and eat foods that have a lot of fiber, such as fruits, vegetables, prune juice, apple juice and  high-fiber cereal. Take a laxative (Miralax, milk of magnesia, Colace, Senna) if you don t move your bowels at least every other day. Other side effects include upset stomach, sleepiness, dizziness, throwing up, tolerance (needing more of the medicine to have the same effect), physical dependence and slowed breathing.    Store your pills in a secure place, locked if possible. We will not replace any lost or stolen medicine. If you don t finish your medicine, please throw away (dispose) as directed by your pharmacist. The [place] Pollution Control Agency has more information about safe disposal: [email or web address]         Primary Care Provider    None Specified       No primary provider on file.        Equal Access to Services     BERNARD MORTON : Bertram Serrano, edilia manuel, allie lau, asuncion dykes . So [facility] [phone].    ATENCIÓN: Si habla español, tiene a block disposición servicios gratuitos de asistencia lingüística. Llame al [phone].    We comply with applicable federal civil rights laws and [place] laws. We do not discriminate on the basis of race, color, national origin, age, disability, sex, sexual orientation, or gender identity.            Thank you!     Thank you for choosing [facility]  for your care. Our goal is always to provide you with excellent care. Hearing back from our patients is one way we can continue to improve our services. Please take a few minutes to complete the written survey that you may receive in the mail after your visit with us. Thank you!             Your Updated Medication List - Protect others around you: Learn how to safely use, store and throw away your medicines at www.disposemymeds.org.          This list is accurate as of 11/7/18  3:17 PM.  Always use your most recent med list.                   Brand Name Dispense Instructions for use  Diagnosis    ALLEGRA ALLERGY 180 MG tablet   Generic drug:  fexofenadine      Take 180 mg by mouth daily        ascorbic acid 1000 MG Tabs    vitamin C     Take 1,000 mg by mouth daily        aspirin 81 MG tablet      Take by mouth daily        atenolol 50 MG tablet    TENORMIN     Take 50 mg by mouth daily        Biotin 5000 MCG Tabs           CALCIUM 600+D PLUS MINERALS PO      Take 1 tablet by mouth daily        cyclobenzaprine 10 MG tablet    FLEXERIL    42 tablet    Take 1 tablet (10 mg) by mouth 3 times daily as needed for muscle spasms    Disc disease, degenerative, lumbar or lumbosacral       diclofenac 75 MG EC tablet    VOLTAREN    90 tablet    Take 1 tablet (75 mg) by mouth 2 times daily as needed for moderate pain    Chronic midline low back pain without sciatica       fish Oil 1200 MG capsule      360 mg omega-3's        FLUTICASONE FUROATE IN           GLUCOSAMINE-CHONDROITIN PO      1500-1200mg        * HYDROcodone-acetaminophen 5-325 MG per tablet    NORCO    30 tablet    Take 2 tablets by mouth every 6 hours as needed for severe pain    Chronic midline low back pain without sciatica       * HYDROcodone-acetaminophen 5-325 MG per tablet    NORCO    30 tablet    Take 2 tablets by mouth every 4 hours as needed for pain    Lumbar degenerative disc disease       IRON PO      Take 1 tablet by mouth every other day        METRONIDAZOLE EX      Externally apply topically daily        MULTIVITAMINS PO      Take 1 tablet by mouth daily        traZODone 50 MG tablet    DESYREL          * Notice:  This list has 2 medication(s) that are the same as other medications prescribed for you. Read the directions carefully, and ask your doctor or other care provider to review them with you.

## 2018-11-09 ENCOUNTER — RADIANT APPOINTMENT (OUTPATIENT)
Dept: GENERAL RADIOLOGY | Facility: CLINIC | Age: 68
End: 2018-11-09
Attending: FAMILY MEDICINE
Payer: COMMERCIAL

## 2018-11-09 VITALS
RESPIRATION RATE: 16 BRPM | TEMPERATURE: 98.4 F | HEART RATE: 64 BPM | DIASTOLIC BLOOD PRESSURE: 95 MMHG | SYSTOLIC BLOOD PRESSURE: 170 MMHG | OXYGEN SATURATION: 98 %

## 2018-11-09 DIAGNOSIS — M51.369 LUMBAR DEGENERATIVE DISC DISEASE: ICD-10-CM

## 2018-11-09 RX ORDER — LIDOCAINE HYDROCHLORIDE 10 MG/ML
30 INJECTION, SOLUTION EPIDURAL; INFILTRATION; INTRACAUDAL; PERINEURAL ONCE
Status: COMPLETED | OUTPATIENT
Start: 2018-11-09 | End: 2018-11-09

## 2018-11-09 RX ORDER — METHYLPREDNISOLONE ACETATE 80 MG/ML
80 INJECTION, SUSPENSION INTRA-ARTICULAR; INTRALESIONAL; INTRAMUSCULAR; SOFT TISSUE ONCE
Status: COMPLETED | OUTPATIENT
Start: 2018-11-09 | End: 2018-11-09

## 2018-11-09 RX ORDER — IOPAMIDOL 408 MG/ML
20 INJECTION, SOLUTION INTRATHECAL ONCE
Status: COMPLETED | OUTPATIENT
Start: 2018-11-09 | End: 2018-11-09

## 2018-11-09 RX ORDER — BUPIVACAINE HYDROCHLORIDE 5 MG/ML
10 INJECTION, SOLUTION EPIDURAL; INTRACAUDAL ONCE
Status: COMPLETED | OUTPATIENT
Start: 2018-11-09 | End: 2018-11-09

## 2018-11-09 RX ADMIN — METHYLPREDNISOLONE ACETATE 80 MG: 80 INJECTION, SUSPENSION INTRA-ARTICULAR; INTRALESIONAL; INTRAMUSCULAR; SOFT TISSUE at 13:25

## 2018-11-09 RX ADMIN — IOPAMIDOL 4 ML: 408 INJECTION, SOLUTION INTRATHECAL at 13:25

## 2018-11-09 RX ADMIN — BUPIVACAINE HYDROCHLORIDE 10 MG: 5 INJECTION, SOLUTION EPIDURAL; INTRACAUDAL at 13:25

## 2018-11-09 RX ADMIN — LIDOCAINE HYDROCHLORIDE 5 ML: 10 INJECTION, SOLUTION EPIDURAL; INFILTRATION; INTRACAUDAL; PERINEURAL at 13:25

## 2018-11-09 NOTE — MR AVS SNAPSHOT
MRN:[id number]                      After Visit Summary   11/9/2018    Renate Tanner    MRN: [id number]           Visit Information        Provider Department      11/9/2018 1:00 PM  NEURO RAD;  IMAGING NURSE; XR3 [facility] Xray           Review of your medicines          These changes are accurate as of 11/9/18  1:30 PM.  If you have any questions, ask your nurse or doctor.               CONTINUE these medicines which have NOT CHANGED        Dose / Directions    ALLEGRA ALLERGY 180 MG tablet   Generic drug:  fexofenadine        Dose:  180 mg   Take 180 mg by mouth daily   Refills:  0       ascorbic acid 1000 MG Tabs   Commonly known as:  vitamin C        Dose:  1000 mg   Take 1,000 mg by mouth daily   Refills:  0       aspirin 81 MG tablet        Take by mouth daily   Refills:  0       atenolol 50 MG tablet   Commonly known as:  TENORMIN        Dose:  50 mg   Take 50 mg by mouth daily   Refills:  0       Biotin 5000 MCG Tabs        Refills:  0       CALCIUM 600+D PLUS MINERALS PO        Dose:  1 tablet   Take 1 tablet by mouth daily   Refills:  0       cyclobenzaprine 10 MG tablet   Commonly known as:  FLEXERIL   Used for:  Disc disease, degenerative, lumbar or lumbosacral        Dose:  10 mg   Take 1 tablet (10 mg) by mouth 3 times daily as needed for muscle spasms   Quantity:  42 tablet   Refills:  0       diclofenac 75 MG EC tablet   Commonly known as:  VOLTAREN   Used for:  Chronic midline low back pain without sciatica        Dose:  75 mg   Take 1 tablet (75 mg) by mouth 2 times daily as needed for moderate pain   Quantity:  90 tablet   Refills:  1       fish Oil 1200 MG capsule        360 mg omega-3's   Refills:  0       FLUTICASONE FUROATE IN        Refills:  0       GLUCOSAMINE-CHONDROITIN PO        1500-1200mg   Refills:  0       * HYDROcodone-acetaminophen 5-325 MG per tablet   Commonly known as:  NORCO   Used for:  Chronic midline low back pain without sciatica         Dose:  2 tablet   Take 2 tablets by mouth every 6 hours as needed for severe pain   Quantity:  30 tablet   Refills:  0       * HYDROcodone-acetaminophen 5-325 MG per tablet   Commonly known as:  NORCO   Used for:  Lumbar degenerative disc disease        Dose:  2 tablet   Take 2 tablets by mouth every 4 hours as needed for pain   Quantity:  30 tablet   Refills:  0       IRON PO        Dose:  1 tablet   Take 1 tablet by mouth every other day   Refills:  0       METRONIDAZOLE EX        Externally apply topically daily   Refills:  0       MULTIVITAMINS PO        Dose:  1 tablet   Take 1 tablet by mouth daily   Refills:  0       traZODone 50 MG tablet   Commonly known as:  DESYREL        Refills:  0       * Notice:  This list has 2 medication(s) that are the same as other medications prescribed for you. Read the directions carefully, and ask your doctor or other care provider to review them with you.             Protect others around you: Learn how to safely use, store and throw away your medicines at www.disposemymeds.org.         Follow-ups after your visit         Care Instructions        Further instructions from your care team       Discharge Instructions for Epidural Steroid Injection/Nerve Block    Care of injection site:    If you have new numbness down your leg after the injection, this may last up to 4-6 hours, but should go away. You may need help with walking until your leg feels normal.    Over the next 24 to 48 hours, pain at the injection site may increase before it gets better.    For the next 48 hours, use ice packs for 15 minutes,3-4 times a day for pain relief.    If you have a bandage, you may remove it the next morning     Do not submerge injection site in water for 24 hours, (no baths. pools, hot tubs). Showers are OK.            Activity:    You should relax today and then you may return to your regular activity tomorrow.    You may eat a normal diet.    Medicines:    Restart all your medicines  tomorrow at your regular dose, including Coumadin (Warfarin).    If you are restarting Coumadin, talk to your doctor about having your INR checked.    If you received steroids: The steroid should help reduce swelling and pain. This may take from 3-14 days. Pain from the shot will be mild and go away in 2-3 days.     Common side effects may include:    Insomnia    Heartburn    Flushed face    Water retention    Increased appetite    Increased blood sugar      If you have diabetes, watch your blood sugar closely, If needed, call your doctor to help control your blood sugar.    Some patients will get lasting relief from a single injection. Others may require additional injections to get results.     Call your doctor or go to the Emergency Room if you have new severe pain or fever.     Additional Information About Your Visit        [employer] Information     [employer] gives you secure access to your electronic health record. If you see a primary care provider, you can also send messages to your care team and make appointments. If you have questions, please call your primary care clinic.  If you do not have a primary care provider, please call [phone] and they will assist you.      [employer] is an electronic gateway that provides easy, online access to your medical records. With [employer], you can request a clinic appointment, read your test results, renew a prescription or communicate with your care team.     To access your existing account, please contact your [facility] or call [phone] for assistance.        Care EveryWhere ID     This is your Care EveryWhere ID. This could be used by other organizations to access your [place] medical records  [id number]        Your Vitals Were     Blood Pressure Pulse Temperature Respirations Pulse Oximetry       157/93 64 98.4  F (36.9  C) (Oral) 16 98%        Primary Care Provider    None Specified      Equal Access to Services     BERNARD MTZ:  Hadii aad ku hadshwetao Sopascualali, waaxda luqadaha, qaybta kaalmada judi, asuncion ginagarrison barronsophy triston. So [facility] [phone].    ATENCIÓN: Si promisela lissy, tiene a block disposición servicios gratuitos de asistencia lingüística. Llame al [phone].    We comply with applicable federal civil rights laws and [place] laws. We do not discriminate on the basis of race, color, national origin, age, disability, sex, sexual orientation, or gender identity.            Thank you!     Thank you for choosing [facility] for your care. Our goal is always to provide you with excellent care. Hearing back from our patients is one way we can continue to improve our services. Please take a few minutes to complete the written survey that you may receive in the mail after you visit with us. Thank you!             Medication List: This is a list of all your medications and when to take them. Check marks below indicate your daily home schedule. Keep this list as a reference.          This list is accurate as of 11/9/18  1:30 PM.  Always use your most recent med list.               Medications           Morning Afternoon Evening Bedtime As Needed    ALLEGRA ALLERGY 180 MG tablet   Take 180 mg by mouth daily   Generic drug:  fexofenadine                                ascorbic acid 1000 MG Tabs   Commonly known as:  vitamin C   Take 1,000 mg by mouth daily                                aspirin 81 MG tablet   Take by mouth daily                                atenolol 50 MG tablet   Commonly known as:  TENORMIN   Take 50 mg by mouth daily                                Biotin 5000 MCG Tabs                                CALCIUM 600+D PLUS MINERALS PO   Take 1 tablet by mouth daily                                cyclobenzaprine 10 MG tablet   Commonly known as:  FLEXERIL   Take 1 tablet (10 mg) by mouth 3 times daily as needed for muscle spasms                                diclofenac 75 MG EC tablet   Commonly known as:   VOLTAREN   Take 1 tablet (75 mg) by mouth 2 times daily as needed for moderate pain                                fish Oil 1200 MG capsule   360 mg omega-3's                                FLUTICASONE FUROATE IN                                GLUCOSAMINE-CHONDROITIN PO   1500-1200mg                                * HYDROcodone-acetaminophen 5-325 MG per tablet   Commonly known as:  NORCO   Take 2 tablets by mouth every 6 hours as needed for severe pain                                * HYDROcodone-acetaminophen 5-325 MG per tablet   Commonly known as:  NORCO   Take 2 tablets by mouth every 4 hours as needed for pain                                IRON PO   Take 1 tablet by mouth every other day                                METRONIDAZOLE EX   Externally apply topically daily                                MULTIVITAMINS PO   Take 1 tablet by mouth daily                                traZODone 50 MG tablet   Commonly known as:  DESYREL                                * Notice:  This list has 2 medication(s) that are the same as other medications prescribed for you. Read the directions carefully, and ask your doctor or other care provider to review them with you.

## 2018-11-09 NOTE — PROGRESS NOTES
Pt did well with the procedure.  No complications. VSS. AVS given and pt escorted to the waiting room.    Adrianne, Imaging Nurse

## 2018-11-26 DIAGNOSIS — M51.369 DEGENERATIVE DISC DISEASE, LUMBAR: Primary | ICD-10-CM

## 2018-11-30 ENCOUNTER — HOSPITAL ENCOUNTER (OUTPATIENT)
Dept: MAMMOGRAPHY | Facility: CLINIC | Age: 68
Discharge: HOME | End: 2018-11-30
Attending: OBSTETRICS & GYNECOLOGY

## 2018-11-30 DIAGNOSIS — Z12.31 VISIT FOR SCREENING MAMMOGRAM: ICD-10-CM

## 2018-12-26 ENCOUNTER — ANCILLARY PROCEDURE (OUTPATIENT)
Dept: GENERAL RADIOLOGY | Facility: CLINIC | Age: 68
End: 2018-12-26
Attending: PEDIATRICS
Payer: COMMERCIAL

## 2018-12-26 VITALS
RESPIRATION RATE: 16 BRPM | SYSTOLIC BLOOD PRESSURE: 156 MMHG | DIASTOLIC BLOOD PRESSURE: 77 MMHG | OXYGEN SATURATION: 98 % | HEART RATE: 59 BPM | TEMPERATURE: 98.7 F

## 2018-12-26 DIAGNOSIS — M51.369 DEGENERATIVE DISC DISEASE, LUMBAR: ICD-10-CM

## 2018-12-26 RX ORDER — IOPAMIDOL 408 MG/ML
20 INJECTION, SOLUTION INTRATHECAL ONCE
Status: COMPLETED | OUTPATIENT
Start: 2018-12-26 | End: 2018-12-26

## 2018-12-26 RX ORDER — LIDOCAINE HYDROCHLORIDE 10 MG/ML
30 INJECTION, SOLUTION EPIDURAL; INFILTRATION; INTRACAUDAL; PERINEURAL ONCE
Status: COMPLETED | OUTPATIENT
Start: 2018-12-26 | End: 2018-12-26

## 2018-12-26 RX ORDER — METHYLPREDNISOLONE ACETATE 80 MG/ML
80 INJECTION, SUSPENSION INTRA-ARTICULAR; INTRALESIONAL; INTRAMUSCULAR; SOFT TISSUE ONCE
Status: COMPLETED | OUTPATIENT
Start: 2018-12-26 | End: 2018-12-26

## 2018-12-26 RX ORDER — BUPIVACAINE HYDROCHLORIDE 5 MG/ML
10 INJECTION, SOLUTION EPIDURAL; INTRACAUDAL ONCE
Status: COMPLETED | OUTPATIENT
Start: 2018-12-26 | End: 2018-12-26

## 2018-12-26 RX ADMIN — METHYLPREDNISOLONE ACETATE 80 MG: 80 INJECTION, SUSPENSION INTRA-ARTICULAR; INTRALESIONAL; INTRAMUSCULAR; SOFT TISSUE at 10:53

## 2018-12-26 RX ADMIN — IOPAMIDOL 10 ML: 408 INJECTION, SOLUTION INTRATHECAL at 10:53

## 2018-12-26 RX ADMIN — BUPIVACAINE HYDROCHLORIDE 15 MG: 5 INJECTION, SOLUTION EPIDURAL; INTRACAUDAL at 10:53

## 2018-12-26 RX ADMIN — LIDOCAINE HYDROCHLORIDE 5 ML: 10 INJECTION, SOLUTION EPIDURAL; INFILTRATION; INTRACAUDAL; PERINEURAL at 10:53

## 2019-07-02 NOTE — TELEPHONE ENCOUNTER
RECORDS RECEIVED FROM: Rt hip pain - No xrays but lumbar images in Epic - Per pt    DATE RECEIVED: 7/2   NOTES STATUS DETAILS   OFFICE NOTE from referring provider N/A    OFFICE NOTE from other specialist Internal    DISCHARGE SUMMARY from hospital N/A    DISCHARGE REPORT from the ER N/A    OPERATIVE REPORT N/A    MEDICATION LIST Internal    IMPLANT RECORD/STICKER N/A    LABS     CBC/DIFF N/A    CULTURES N/A    INJECTIONS DONE IN RADIOLOGY N/A    MRI Internal 2011 lumbar   CT SCAN N/A    XRAYS (IMAGES & REPORTS) Internal 2018, 2011 lumbar   TUMOR     PATHOLOGY  Slides & report N/A

## 2019-07-09 ENCOUNTER — ANCILLARY PROCEDURE (OUTPATIENT)
Dept: GENERAL RADIOLOGY | Facility: CLINIC | Age: 69
End: 2019-07-09
Payer: COMMERCIAL

## 2019-07-09 ENCOUNTER — OFFICE VISIT (OUTPATIENT)
Dept: ORTHOPEDICS | Facility: CLINIC | Age: 69
End: 2019-07-09
Payer: COMMERCIAL

## 2019-07-09 ENCOUNTER — ANCILLARY PROCEDURE (OUTPATIENT)
Dept: GENERAL RADIOLOGY | Facility: CLINIC | Age: 69
End: 2019-07-09
Attending: FAMILY MEDICINE
Payer: COMMERCIAL

## 2019-07-09 ENCOUNTER — PRE VISIT (OUTPATIENT)
Dept: ORTHOPEDICS | Facility: CLINIC | Age: 69
End: 2019-07-09

## 2019-07-09 VITALS
HEIGHT: 61 IN | DIASTOLIC BLOOD PRESSURE: 83 MMHG | HEART RATE: 60 BPM | BODY MASS INDEX: 32.55 KG/M2 | SYSTOLIC BLOOD PRESSURE: 141 MMHG | WEIGHT: 172.4 LBS

## 2019-07-09 DIAGNOSIS — M25.551 RIGHT HIP PAIN: ICD-10-CM

## 2019-07-09 DIAGNOSIS — M25.551 RIGHT HIP PAIN: Primary | ICD-10-CM

## 2019-07-09 DIAGNOSIS — M16.11 PRIMARY OSTEOARTHRITIS OF RIGHT HIP: ICD-10-CM

## 2019-07-09 DIAGNOSIS — M16.11 PRIMARY OSTEOARTHRITIS OF RIGHT HIP: Primary | ICD-10-CM

## 2019-07-09 RX ORDER — IOPAMIDOL 408 MG/ML
20 INJECTION, SOLUTION INTRATHECAL ONCE
Status: COMPLETED | OUTPATIENT
Start: 2019-07-09 | End: 2019-07-09

## 2019-07-09 RX ORDER — TRIAMCINOLONE ACETONIDE 40 MG/ML
40 INJECTION, SUSPENSION INTRA-ARTICULAR; INTRAMUSCULAR ONCE
Status: COMPLETED | OUTPATIENT
Start: 2019-07-09 | End: 2019-07-09

## 2019-07-09 RX ORDER — IBUPROFEN 600 MG/1
600 TABLET, FILM COATED ORAL EVERY 6 HOURS PRN
Qty: 300 TABLET | Refills: 3 | Status: SHIPPED | OUTPATIENT
Start: 2019-07-09 | End: 2023-09-05

## 2019-07-09 RX ORDER — BUPIVACAINE HYDROCHLORIDE 2.5 MG/ML
10 INJECTION, SOLUTION EPIDURAL; INFILTRATION; INTRACAUDAL ONCE
Status: COMPLETED | OUTPATIENT
Start: 2019-07-09 | End: 2019-07-09

## 2019-07-09 RX ORDER — LIDOCAINE HYDROCHLORIDE 10 MG/ML
30 INJECTION, SOLUTION EPIDURAL; INFILTRATION; INTRACAUDAL; PERINEURAL ONCE
Status: COMPLETED | OUTPATIENT
Start: 2019-07-09 | End: 2019-07-09

## 2019-07-09 RX ADMIN — IOPAMIDOL 2 ML: 408 INJECTION, SOLUTION INTRATHECAL at 14:54

## 2019-07-09 RX ADMIN — BUPIVACAINE HYDROCHLORIDE 2 ML: 2.5 INJECTION, SOLUTION EPIDURAL; INFILTRATION; INTRACAUDAL at 14:54

## 2019-07-09 RX ADMIN — LIDOCAINE HYDROCHLORIDE 5 ML: 10 INJECTION, SOLUTION EPIDURAL; INFILTRATION; INTRACAUDAL; PERINEURAL at 14:54

## 2019-07-09 RX ADMIN — TRIAMCINOLONE ACETONIDE 40 MG: 40 INJECTION, SUSPENSION INTRA-ARTICULAR; INTRAMUSCULAR at 14:54

## 2019-07-09 ASSESSMENT — MIFFLIN-ST. JEOR: SCORE: 1239.38

## 2019-07-09 NOTE — LETTER
"  7/9/2019      RE: Renate Tanner  [address] [id number]        Subjective:   Renate Tanner is a 68 year old female who presents with right anterior hip pain. The patient reports that her hip feels \"dislocated\" sometimes.  She notes that she is been having right groin discomfort for the past several months and this is been somewhat intermittent.  For the past 2 to 3 weeks it is now become more consistent and more sharp.  She states that she does not trust the hip when she is transitioning from a sitting to a standing position.  With the transition there is a sharp pain in the groin.  She has had no trauma to the hip.  She otherwise has known arthritis.  She has been taking Voltaren 75 mg either daily or twice daily but has noted that really has not been helping.  She has tried Celebrex and Mobic in the past with no relief.  She has tried acetaminophen with no relief.  She has tried naproxen with no relief.  She believes she did have relief with ibuprofen but she was having to take it multiple times per day and she now understands that she has to utilize some gastric protection based on her age.  She is not having any dysuria or history of peptic ulcer disease.  She is present today along with her daughter who is an employee in our radiology department.    Background:   Date of injury: None  Duration of symptoms: 2-3 weeks severe pain intermittently  Mechanism of Injury: Insidious Onset; Unknown   Intensity: Severe pain   Aggravating factors: Walking  Relieving Factors: Lumbar extension, hydrocodone, diclofenac  Prior Evaluation: None    PAST MEDICAL, SOCIAL, SURGICAL AND FAMILY HISTORY: She  has no past medical history on file.  She  has a past surgical history that includes IR Caudal Epidural Injection Single (12/26/2018).  Her family history is not on file.  She reports that she has quit smoking. She has never used smokeless tobacco.    ALLERGIES: She is allergic to seasonal " "allergies.    CURRENT MEDICATIONS: She has a current medication list which includes the following prescription(s): vitamin c, aspirin, atenolol, biotin, calcium carbonate-vit d-min, diclofenac, fexofenadine, fluticasone furoate, glucosamine-chondroitin, hydrocodone-acetaminophen, hydrocodone-acetaminophen, metronidazole, multiple vitamin, fish oil, trazodone, cyclobenzaprine, and iron.     REVIEW OF SYSTEMS: 10 point review of systems is negative except as noted above.     Exam:   /83 (BP Location: Right arm, Patient Position: Sitting, Cuff Size: Adult Large)   Pulse 60   Ht 1.549 m (5' 1\")   Wt 78.2 kg (172 lb 6.4 oz)   BMI 32.57 kg/m         CONSTITUTIONAL: healthy, alert and no distress  SKIN: no suspicious lesions or rashes  GAIT: broad based  NEUROLOGIC: Non-focal  PSYCHIATRIC: affect normal/bright and mentation appears normal.    MUSCULOSKELETAL:   RIGHT HIP: comprehensive examination demonstrates FROM with groin pain with full flexion and full IR, (+) FAdIR, (+) [place], (-) log roll. MMT of the hip demonstrates ability to actively flex, abduct, adduct and extend the hip.  She is mildly tender to palpation in the right anterior femoral acetabular articulation or femoral triangle and nontender over the greater trochanter.       Assessment/Plan:   (M16.11) Primary osteoarthritis of right hip  (primary encounter diagnosis)  Comment: Renate is a 68-year-old female with right hip osteoarthritis.  Her radiographs and exam are consistent.  We are going to proceed with a right hip intra-articular corticosteroid injection to try and get her some relief.  She has failed all nonsteroidals and will try return to ibuprofen at 600 mg p.o. every 6 hours as needed.  She is also going to start Prilosec at 20 mg p.o. daily and will take this daily.  She and her daughter have a supply of this at home.  If she fails ibuprofen then she could try Relafen or 1 of the salicylates such as Trilifate for pain relief.  Renate does " tell me she will be going to [place] in September and is possible she would need a repeat injection prior to her trip but also a consideration that she would need some [place] to take on her trip in the event that her hip becomes aggravated.  She is aware of this.  I have not given her a narcotic prescription today.  Plan: ibuprofen (ADVIL/MOTRIN) 600 MG tablet, XR         Joint Injection Major Right                  Los Neal MD

## 2019-07-09 NOTE — PROGRESS NOTES
" Subjective:   Renate Tanner is a 68 year old female who presents with right anterior hip pain. The patient reports that her hip feels \"dislocated\" sometimes.  She notes that she is been having right groin discomfort for the past several months and this is been somewhat intermittent.  For the past 2 to 3 weeks it is now become more consistent and more sharp.  She states that she does not trust the hip when she is transitioning from a sitting to a standing position.  With the transition there is a sharp pain in the groin.  She has had no trauma to the hip.  She otherwise has known arthritis.  She has been taking Voltaren 75 mg either daily or twice daily but has noted that really has not been helping.  She has tried Celebrex and Mobic in the past with no relief.  She has tried acetaminophen with no relief.  She has tried naproxen with no relief.  She believes she did have relief with ibuprofen but she was having to take it multiple times per day and she now understands that she has to utilize some gastric protection based on her age.  She is not having any dysuria or history of peptic ulcer disease.  She is present today along with her daughter who is an employee in our radiology department.    Background:   Date of injury: None  Duration of symptoms: 2-3 weeks severe pain intermittently  Mechanism of Injury: Insidious Onset; Unknown   Intensity: Severe pain   Aggravating factors: Walking  Relieving Factors: Lumbar extension, hydrocodone, diclofenac  Prior Evaluation: None    PAST MEDICAL, SOCIAL, SURGICAL AND FAMILY HISTORY: She  has no past medical history on file.  She  has a past surgical history that includes IR Caudal Epidural Injection Single (12/26/2018).  Her family history is not on file.  She reports that she has quit smoking. She has never used smokeless tobacco.    ALLERGIES: She is allergic to seasonal allergies.    CURRENT MEDICATIONS: She has a current medication list which includes the following " "prescription(s): vitamin c, aspirin, atenolol, biotin, calcium carbonate-vit d-min, diclofenac, fexofenadine, fluticasone furoate, glucosamine-chondroitin, hydrocodone-acetaminophen, hydrocodone-acetaminophen, metronidazole, multiple vitamin, fish oil, trazodone, cyclobenzaprine, and iron.     REVIEW OF SYSTEMS: 10 point review of systems is negative except as noted above.     Exam:   /83 (BP Location: Right arm, Patient Position: Sitting, Cuff Size: Adult Large)   Pulse 60   Ht 1.549 m (5' 1\")   Wt 78.2 kg (172 lb 6.4 oz)   BMI 32.57 kg/m        CONSTITUTIONAL: healthy, alert and no distress  SKIN: no suspicious lesions or rashes  GAIT: broad based  NEUROLOGIC: Non-focal  PSYCHIATRIC: affect normal/bright and mentation appears normal.    MUSCULOSKELETAL:   RIGHT HIP: comprehensive examination demonstrates FROM with groin pain with full flexion and full IR, (+) FAdIR, (+) [place], (-) log roll. MMT of the hip demonstrates ability to actively flex, abduct, adduct and extend the hip.  She is mildly tender to palpation in the right anterior femoral acetabular articulation or femoral triangle and nontender over the greater trochanter.       Assessment/Plan:   (M16.11) Primary osteoarthritis of right hip  (primary encounter diagnosis)  Comment: Renate is a 68-year-old female with right hip osteoarthritis.  Her radiographs and exam are consistent.  We are going to proceed with a right hip intra-articular corticosteroid injection to try and get her some relief.  She has failed all nonsteroidals and will try return to ibuprofen at 600 mg p.o. every 6 hours as needed.  She is also going to start Prilosec at 20 mg p.o. daily and will take this daily.  She and her daughter have a supply of this at home.  If she fails ibuprofen then she could try Relafen or 1 of the salicylates such as Trilifate for pain relief.  Renate does tell me she will be going to [place] in September and is possible she would need a repeat injection " prior to her trip but also a consideration that she would need some [place] to take on her trip in the event that her hip becomes aggravated.  She is aware of this.  I have not given her a narcotic prescription today.  Plan: ibuprofen (ADVIL/MOTRIN) 600 MG tablet, XR         Joint Injection Major Right

## 2019-12-02 ENCOUNTER — RECORDS - HEALTHEAST (OUTPATIENT)
Dept: MAMMOGRAPHY | Facility: CLINIC | Age: 69
End: 2019-12-02

## 2019-12-02 DIAGNOSIS — Z12.31 ENCOUNTER FOR SCREENING MAMMOGRAM FOR MALIGNANT NEOPLASM OF BREAST: ICD-10-CM

## 2020-03-01 ENCOUNTER — HEALTH MAINTENANCE LETTER (OUTPATIENT)
Age: 70
End: 2020-03-01

## 2020-12-09 ENCOUNTER — HOSPITAL ENCOUNTER (OUTPATIENT)
Dept: MAMMOGRAPHY | Facility: CLINIC | Age: 70
Discharge: HOME | End: 2020-12-09

## 2020-12-09 DIAGNOSIS — Z12.31 VISIT FOR SCREENING MAMMOGRAM: ICD-10-CM

## 2020-12-14 ENCOUNTER — HEALTH MAINTENANCE LETTER (OUTPATIENT)
Age: 70
End: 2020-12-14

## 2021-02-27 ENCOUNTER — HEALTH MAINTENANCE LETTER (OUTPATIENT)
Age: 71
End: 2021-02-27

## 2021-04-17 ENCOUNTER — HEALTH MAINTENANCE LETTER (OUTPATIENT)
Age: 71
End: 2021-04-17

## 2021-05-24 ENCOUNTER — RECORDS - HEALTHEAST (OUTPATIENT)
Dept: ADMINISTRATIVE | Facility: CLINIC | Age: 71
End: 2021-05-24

## 2021-05-25 ENCOUNTER — RECORDS - HEALTHEAST (OUTPATIENT)
Dept: ADMINISTRATIVE | Facility: CLINIC | Age: 71
End: 2021-05-25

## 2021-05-27 ENCOUNTER — RECORDS - HEALTHEAST (OUTPATIENT)
Dept: ADMINISTRATIVE | Facility: CLINIC | Age: 71
End: 2021-05-27

## 2021-05-28 ENCOUNTER — RECORDS - HEALTHEAST (OUTPATIENT)
Dept: ADMINISTRATIVE | Facility: CLINIC | Age: 71
End: 2021-05-28

## 2021-06-05 ENCOUNTER — RECORDS - HEALTHEAST (OUTPATIENT)
Dept: SCHEDULING | Facility: CLINIC | Age: 71
End: 2021-06-05

## 2021-06-05 DIAGNOSIS — N63.0 LUMP OR MASS IN BREAST: ICD-10-CM

## 2021-07-13 ENCOUNTER — RECORDS - HEALTHEAST (OUTPATIENT)
Dept: ADMINISTRATIVE | Facility: CLINIC | Age: 71
End: 2021-07-13

## 2021-07-21 ENCOUNTER — RECORDS - HEALTHEAST (OUTPATIENT)
Dept: ADMINISTRATIVE | Facility: CLINIC | Age: 71
End: 2021-07-21

## 2021-07-23 ENCOUNTER — RECORDS - HEALTHEAST (OUTPATIENT)
Dept: SCHEDULING | Facility: CLINIC | Age: 71
End: 2021-07-23

## 2021-07-23 DIAGNOSIS — Z12.31 OTHER SCREENING MAMMOGRAM: ICD-10-CM

## 2021-10-02 ENCOUNTER — HEALTH MAINTENANCE LETTER (OUTPATIENT)
Age: 71
End: 2021-10-02

## 2021-12-09 ENCOUNTER — ANCILLARY PROCEDURE (OUTPATIENT)
Dept: MAMMOGRAPHY | Facility: CLINIC | Age: 71
End: 2021-12-09
Attending: FAMILY MEDICINE
Payer: COMMERCIAL

## 2021-12-09 DIAGNOSIS — Z12.31 VISIT FOR SCREENING MAMMOGRAM: ICD-10-CM

## 2021-12-09 PROCEDURE — 77063 BREAST TOMOSYNTHESIS BI: CPT

## 2022-05-14 ENCOUNTER — HEALTH MAINTENANCE LETTER (OUTPATIENT)
Age: 72
End: 2022-05-14

## 2022-09-03 ENCOUNTER — HEALTH MAINTENANCE LETTER (OUTPATIENT)
Age: 72
End: 2022-09-03

## 2022-12-27 ENCOUNTER — ANCILLARY PROCEDURE (OUTPATIENT)
Dept: MAMMOGRAPHY | Facility: CLINIC | Age: 72
End: 2022-12-27
Attending: FAMILY MEDICINE
Payer: COMMERCIAL

## 2022-12-27 DIAGNOSIS — Z12.31 SCREENING MAMMOGRAM, ENCOUNTER FOR: ICD-10-CM

## 2022-12-27 PROCEDURE — 77067 SCR MAMMO BI INCL CAD: CPT

## 2023-02-16 ENCOUNTER — TRANSFERRED RECORDS (OUTPATIENT)
Dept: HEALTH INFORMATION MANAGEMENT | Facility: CLINIC | Age: 73
End: 2023-02-16

## 2023-06-02 ENCOUNTER — HEALTH MAINTENANCE LETTER (OUTPATIENT)
Age: 73
End: 2023-06-02

## 2023-08-14 NOTE — TELEPHONE ENCOUNTER
[facility]/ [facility]   Action [facility] Sent fax request for imagine to be pushed.

## 2023-08-17 NOTE — TELEPHONE ENCOUNTER
[facility] received, email sent to image resource   Action Taken Sent email to save imaging

## 2023-08-21 ENCOUNTER — OFFICE VISIT (OUTPATIENT)
Dept: NEUROSURGERY | Facility: CLINIC | Age: 73
End: 2023-08-21
Attending: PHYSICIAN ASSISTANT
Payer: COMMERCIAL

## 2023-08-21 ENCOUNTER — ANCILLARY PROCEDURE (OUTPATIENT)
Dept: GENERAL RADIOLOGY | Facility: CLINIC | Age: 73
End: 2023-08-21
Attending: PHYSICIAN ASSISTANT
Payer: COMMERCIAL

## 2023-08-21 ENCOUNTER — PRE VISIT (OUTPATIENT)
Dept: NEUROSURGERY | Facility: CLINIC | Age: 73
End: 2023-08-21

## 2023-08-21 VITALS
BODY MASS INDEX: 31.63 KG/M2 | HEIGHT: 61 IN | SYSTOLIC BLOOD PRESSURE: 113 MMHG | WEIGHT: 167.5 LBS | HEART RATE: 66 BPM | OXYGEN SATURATION: 100 % | DIASTOLIC BLOOD PRESSURE: 68 MMHG

## 2023-08-21 DIAGNOSIS — Z98.890 S/P LUMBAR MICRODISCECTOMY: Primary | ICD-10-CM

## 2023-08-21 DIAGNOSIS — M54.40 BILATERAL LOW BACK PAIN WITH SCIATICA, SCIATICA LATERALITY UNSPECIFIED, UNSPECIFIED CHRONICITY: ICD-10-CM

## 2023-08-21 PROCEDURE — G0463 HOSPITAL OUTPT CLINIC VISIT: HCPCS | Performed by: PHYSICIAN ASSISTANT

## 2023-08-21 PROCEDURE — 72120 X-RAY BEND ONLY L-S SPINE: CPT | Mod: TC | Performed by: RADIOLOGY

## 2023-08-21 PROCEDURE — 99203 OFFICE O/P NEW LOW 30 MIN: CPT | Performed by: PHYSICIAN ASSISTANT

## 2023-08-21 ASSESSMENT — PAIN SCALES - GENERAL: PAINLEVEL: SEVERE PAIN (6)

## 2023-08-21 NOTE — PROGRESS NOTES
NEUROSURGERY CLINIC CONSULT NOTE     DATE OF VISIT: 8/21/2023     SUBJECTIVE:     Renate Tanner is a pleasant 72 year old female who presents to the clinic today for consultation on lumbar spine pain with bilateral lower extremity pain. She is referred to the Neurosurgery Clinic by Dr. Samaniego in Primary Care. Pertinent medical history consists of L3-5 decompression and fusion performed 08/11/2011 by Dr. Ma.     Today, she reports a two-year history of symptoms. She describes a daily with fluctuating intensity, sharp, aching, pain that initiates in the midwest low lumbar region and radiates distally in no specific distribution. Right leg is lateral to the knee and left side is primarily affecting her greater trochanteric region. She did have a right ankle fracture whre she was wearing a cam boot. This pain is not accompanied by paresthesia, numbness or perceived weakness. Prolonged walking and standing aggravate the symptoms, while alleviation is obtained by sitting down. No mechanism of injury such as trauma or a fall is associated with the onset of the pain. There are no bowel or bladder changes. She denies saddle anesthesia. She denies changes in gait, instability, or falling episodes.     She has participated in conservative therapies to include physical therapy and epidural steroid injections in the past.         Current Outpatient Medications:     ascorbic acid (VITAMIN C) 1000 MG TABS, Take 1,000 mg by mouth daily, Disp: , Rfl:     Biotin 5000 MCG TABS, , Disp: , Rfl:     Calcium Carbonate-Vit D-Min (CALCIUM 600+D PLUS MINERALS PO), Take 1 tablet by mouth daily, Disp: , Rfl:     fexofenadine (ALLEGRA ALLERGY) 180 MG tablet, Take 180 mg by mouth daily, Disp: , Rfl:     GLUCOSAMINE-CHONDROITIN PO, 1500-1200mg, Disp: , Rfl:     Multiple Vitamin (MULTIVITAMINS PO), Take 1 tablet by mouth daily, Disp: , Rfl:     traZODone (DESYREL) 50 MG tablet, , Disp: , Rfl:     aspirin 81 MG tablet, Take by mouth daily,  "Disp: , Rfl:     atenolol (TENORMIN) 50 MG tablet, Take 50 mg by mouth daily, Disp: , Rfl:     FLUTICASONE FUROATE IN, , Disp: , Rfl:     HYDROcodone-acetaminophen (NORCO) 5-325 MG per tablet, Take 2 tablets by mouth every 4 hours as needed for pain, Disp: 30 tablet, Rfl: 0    ibuprofen (ADVIL/MOTRIN) 600 MG tablet, Take 1 tablet (600 mg) by mouth every 6 hours as needed for moderate pain, Disp: 300 tablet, Rfl: 3    METRONIDAZOLE EX, Externally apply topically daily, Disp: , Rfl:     Omega-3 Fatty Acids (FISH OIL) 1200 MG CAPS, 360 mg omega-3's, Disp: , Rfl:      Allergies   Allergen Reactions    Seasonal Allergies         No past medical history on file.     ROS: 10 point review of symptoms are negative other than the symptoms noted above in the HPI.     Family History has been reviewed with the patient, there are no pertinent findings to presenting concern.     Past Surgical History:   Procedure Laterality Date    IR TRANSLAMINAR EPIDURAL LUMBAR INJ INCL IMAGING  12/26/2018              OBJECTIVE:   /68   Pulse 66   Ht 1.537 m (5' 0.5\")   Wt 76 kg (167 lb 8 oz)   SpO2 100%   BMI 32.17 kg/m     Body mass index is 32.17 kg/m .     Imaging:     Lumbar MRI 08/10/23    1. Multilevel lumbar spondylosis status post instrumented spinal fusion at L3-L4, progressed since 05/26/2021 due to worsening adjacent segment disc degeneration and new associated degenerative retrolisthesis at L4-L5.  2. Increased severe bilateral neural foraminal stenosis at L4-L5 with disc material displacing and possibly impinging the L4 dorsal root ganglia.  3. Increased Modic type I degenerative endplate signal changes at L5-S1 and periarticular marrow edema about the L4-L5 facet joints, suggestive of active inflammatory facet arthropathy.  4. Stable moderate left and mild to moderate right neural foraminal stenosis at L5-S1.  5. Mild spinal canal stenosis L1-L3 and L5-S1.  6. Wide surgical decompression of the spinal canal at L3-L4 " and L4-L5 and bilateral neural foramina at L3-L4.  7. Solid bony ankylosis at L3-L4.     Lumbar x-rays 8/21/23    Full radiological report in chart. Imaging was reviewed with with patient today.     Exam:     Patient appears comfortable, conversational, and in no apparent distress.   Head: Normocephalic, without obvious abnormality, atraumatic, no facial asymmetry.   Eyes: conjunctivae/corneas clear. PERRL, EOM's intact.   Throat: lips, mucosa, and tongue normal; teeth and gums normal.   Neck: supple, symmetrical, trachea midline, no adenopathy and thyroid: not enlarged, symmetric, no tenderness/mass/nodules.   Lungs: clear to auscultation bilaterally.   Heart: regular rate and rhythm.   Abdomen: soft, non-tender; bowel sounds normal; no masses, no organomegaly.   Pulses: 2+ and symmetric.   Skin: Skin color, texture, turgor normal. No rashes or lesions.     CN II-XII grossly intact, alert and appropriate with conversation and following commands.   Gait is non-antalgic. Able to tandem walk. Able to walk on toes and heels without difficulty.   Cervical spine is non tender to palpation. Appropriate range of motion of neck, not concerning for lhermitte's phenomenon.   Bilateral bicep 2/4 and tricep reflexes 1/4. Sensation intact throughout upper extremities.     UE muscle strength  Right  Left    Deltoid  5/5  5/5    Biceps  5/5  5/5    Triceps  5/5  5/5    Hand intrinsics  5/5  5/5    Hand grasp  5/5  5/5    Mercedes signs  neg  neg      Lumbar spine is non tender to palpation.  Intact sensation throughout lower extremities.   Bilateral patellar 2/4 and achilles reflex 1/4. Negative for pain with straight leg raise.     LE muscle strength  Right  Left    Iliopsoas (hip flexion)  5/5  5/5    Quad (knee extension)  5/5  5/5    Hamstring (knee flexion)  5/5  5/5    Gastrocnemius (PF)  5/5  5/5    Tibialis Ant. (DF)  5/5  5/5    EHL  5/5  5/5      Negative Babinski bilaterally. Negative for clonus.   Calves are soft and  non-tender bilaterally.       ASSESSMENT/PLAN:     Renate Tanner is a 72 year old female who presents to the clinic for consultation on a two-year history of a daily with fluctuating intensity, sharp, aching, pain that initiates in the midline low lumbar region and radiates distally in no specific distribution. Right leg is lateral to the knee and left side is primarily affecting her greater trochanteric region. She did have a right ankle fracture where she was wearing a cam boot. This pain is not accompanied by paresthesia, numbness or perceived weaknes. The patient's most recent imaging was reviewed with her today. It was explained that images show multilevel lumbar spondylosis status post instrumented spinal fusion at L3-L4, progressed since 05/26/2021 due to worsening adjacent segment disc degeneration and new associated degenerative retrolisthesis at L4-L5. She also has increased severe bilateral neural foraminal stenosis at L4-L5 with disc material displacing and possibly impinging the L4 dorsal root ganglia. On exam, she is noted to have appropriate strength, sensation and range of motion. She has attempted conservative management with physical therapy and epidural steroid injections, without resolution of symptoms.     Based on her physical exam, imaging review and past treatments, we feel that it would be in her best interest to continue a conservative approach by obtaining an evaluation by our colleague, Dr. Harris of Physical Medicine and Rehabilitation, to further discuss possible non-operative treatment options to include stretching and strengthening exercises as well as different types of steroid injections/ablations/blocks.     We will also obtain flexion and extention x-rays today. Ms. Tanner is not interested in surgical options at this time.     We also discussed signs of a worsening problem that she should seek being evaluated.        Respectfully,     John Carmona, NIMAS, LITZY  [facility]  [place] [facility]  [facility]  Tel: [phone]    Exam, imaging, and plan reviewed by Dr. Nolasco.

## 2023-08-21 NOTE — NURSING NOTE
"Renate Tanner is a 72 year old female who presents for:  Chief Complaint   Patient presents with    RECHECK     Spinal stenosis        Initial Vitals:  /68   Pulse 66   Ht 5' 0.5\" (1.537 m)   Wt 167 lb 8 oz (76 kg)   SpO2 100%   BMI 32.17 kg/m   Estimated body mass index is 32.17 kg/m  as calculated from the following:    Height as of this encounter: 5' 0.5\" (1.537 m).    Weight as of this encounter: 167 lb 8 oz (76 kg).. Body surface area is 1.8 meters squared. BP completed using cuff size: small regular  Severe Pain (6)        Mary Han   "

## 2023-08-21 NOTE — LETTER
8/21/2023         RE: Renate Tanner  [address] [address]        Dear Colleague,    Thank you for referring your patient, Renate Tanner, to the [facility] [place]. Please see a copy of my visit note below.    NEUROSURGERY CLINIC CONSULT NOTE     DATE OF VISIT: 8/21/2023     SUBJECTIVE:     Renate Tanner is a pleasant 72 year old female who presents to the clinic today for consultation on lumbar spine pain with bilateral lower extremity pain. She is referred to the Neurosurgery Clinic by Dr. Samaniego in Primary Care. Pertinent medical history consists of L3-5 decompression and fusion performed 08/11/2011 by Dr. Ma.     Today, she reports a two-year history of symptoms. She describes a daily with fluctuating intensity, sharp, aching, pain that initiates in the midwest low lumbar region and radiates distally in no specific distribution. Right leg is lateral to the knee and left side is primarily affecting her greater trochanteric region. She did have a right ankle fracture whre she was wearing a cam boot. This pain is not accompanied by paresthesia, numbness or perceived weakness. Prolonged walking and standing aggravate the symptoms, while alleviation is obtained by sitting down. No mechanism of injury such as trauma or a fall is associated with the onset of the pain. There are no bowel or bladder changes. She denies saddle anesthesia. She denies changes in gait, instability, or falling episodes.     She has participated in conservative therapies to include physical therapy and epidural steroid injections in the past.         Current Outpatient Medications:      ascorbic acid (VITAMIN C) 1000 MG TABS, Take 1,000 mg by mouth daily, Disp: , Rfl:      Biotin 5000 MCG TABS, , Disp: , Rfl:      Calcium Carbonate-Vit D-Min (CALCIUM 600+D PLUS MINERALS PO), Take 1 tablet by mouth daily, Disp: , Rfl:      fexofenadine (ALLEGRA ALLERGY) 180 MG tablet, Take  "180 mg by mouth daily, Disp: , Rfl:      GLUCOSAMINE-CHONDROITIN PO, 1500-1200mg, Disp: , Rfl:      Multiple Vitamin (MULTIVITAMINS PO), Take 1 tablet by mouth daily, Disp: , Rfl:      traZODone (DESYREL) 50 MG tablet, , Disp: , Rfl:      aspirin 81 MG tablet, Take by mouth daily, Disp: , Rfl:      atenolol (TENORMIN) 50 MG tablet, Take 50 mg by mouth daily, Disp: , Rfl:      FLUTICASONE FUROATE IN, , Disp: , Rfl:      HYDROcodone-acetaminophen (NORCO) 5-325 MG per tablet, Take 2 tablets by mouth every 4 hours as needed for pain, Disp: 30 tablet, Rfl: 0     ibuprofen (ADVIL/MOTRIN) 600 MG tablet, Take 1 tablet (600 mg) by mouth every 6 hours as needed for moderate pain, Disp: 300 tablet, Rfl: 3     METRONIDAZOLE EX, Externally apply topically daily, Disp: , Rfl:      Omega-3 Fatty Acids (FISH OIL) 1200 MG CAPS, 360 mg omega-3's, Disp: , Rfl:      Allergies   Allergen Reactions     Seasonal Allergies         No past medical history on file.     ROS: 10 point review of symptoms are negative other than the symptoms noted above in the HPI.     Family History has been reviewed with the patient, there are no pertinent findings to presenting concern.     Past Surgical History:   Procedure Laterality Date     IR TRANSLAMINAR EPIDURAL LUMBAR INJ INCL IMAGING  12/26/2018              OBJECTIVE:   /68   Pulse 66   Ht 1.537 m (5' 0.5\")   Wt 76 kg (167 lb 8 oz)   SpO2 100%   BMI 32.17 kg/m     Body mass index is 32.17 kg/m .     Imaging:     Lumbar MRI 08/10/23    1. Multilevel lumbar spondylosis status post instrumented spinal fusion at L3-L4, progressed since 05/26/2021 due to worsening adjacent segment disc degeneration and new associated degenerative retrolisthesis at L4-L5.  2. Increased severe bilateral neural foraminal stenosis at L4-L5 with disc material displacing and possibly impinging the L4 dorsal root ganglia.  3. Increased Modic type I degenerative endplate signal changes at L5-S1 and periarticular " marrow edema about the L4-L5 facet joints, suggestive of active inflammatory facet arthropathy.  4. Stable moderate left and mild to moderate right neural foraminal stenosis at L5-S1.  5. Mild spinal canal stenosis L1-L3 and L5-S1.  6. Wide surgical decompression of the spinal canal at L3-L4 and L4-L5 and bilateral neural foramina at L3-L4.  7. Solid bony ankylosis at L3-L4.     Lumbar x-rays 8/21/23    Full radiological report in chart. Imaging was reviewed with with patient today.     Exam:     Patient appears comfortable, conversational, and in no apparent distress.   Head: Normocephalic, without obvious abnormality, atraumatic, no facial asymmetry.   Eyes: conjunctivae/corneas clear. PERRL, EOM's intact.   Throat: lips, mucosa, and tongue normal; teeth and gums normal.   Neck: supple, symmetrical, trachea midline, no adenopathy and thyroid: not enlarged, symmetric, no tenderness/mass/nodules.   Lungs: clear to auscultation bilaterally.   Heart: regular rate and rhythm.   Abdomen: soft, non-tender; bowel sounds normal; no masses, no organomegaly.   Pulses: 2+ and symmetric.   Skin: Skin color, texture, turgor normal. No rashes or lesions.     CN II-XII grossly intact, alert and appropriate with conversation and following commands.   Gait is non-antalgic. Able to tandem walk. Able to walk on toes and heels without difficulty.   Cervical spine is non tender to palpation. Appropriate range of motion of neck, not concerning for lhermitte's phenomenon.   Bilateral bicep 2/4 and tricep reflexes 1/4. Sensation intact throughout upper extremities.     UE muscle strength  Right  Left    Deltoid  5/5  5/5    Biceps  5/5  5/5    Triceps  5/5  5/5    Hand intrinsics  5/5  5/5    Hand grasp  5/5  5/5    Mercedes signs  neg  neg      Lumbar spine is non tender to palpation.  Intact sensation throughout lower extremities.   Bilateral patellar 2/4 and achilles reflex 1/4. Negative for pain with straight leg raise.     LE muscle  strength  Right  Left    Iliopsoas (hip flexion)  5/5  5/5    Quad (knee extension)  5/5  5/5    Hamstring (knee flexion)  5/5  5/5    Gastrocnemius (PF)  5/5  5/5    Tibialis Ant. (DF)  5/5  5/5    EHL  5/5  5/5      Negative Babinski bilaterally. Negative for clonus.   Calves are soft and non-tender bilaterally.       ASSESSMENT/PLAN:     Renate Tanner is a 72 year old female who presents to the clinic for consultation on a two-year history of a daily with fluctuating intensity, sharp, aching, pain that initiates in the midline low lumbar region and radiates distally in no specific distribution. Right leg is lateral to the knee and left side is primarily affecting her greater trochanteric region. She did have a right ankle fracture where she was wearing a cam boot. This pain is not accompanied by paresthesia, numbness or perceived weaknes. The patient's most recent imaging was reviewed with her today. It was explained that images show multilevel lumbar spondylosis status post instrumented spinal fusion at L3-L4, progressed since 05/26/2021 due to worsening adjacent segment disc degeneration and new associated degenerative retrolisthesis at L4-L5. She also has increased severe bilateral neural foraminal stenosis at L4-L5 with disc material displacing and possibly impinging the L4 dorsal root ganglia. On exam, she is noted to have appropriate strength, sensation and range of motion. She has attempted conservative management with physical therapy and epidural steroid injections, without resolution of symptoms.     Based on her physical exam, imaging review and past treatments, we feel that it would be in her best interest to continue a conservative approach by obtaining an evaluation by our colleague, Dr. Harris of Physical Medicine and Rehabilitation, to further discuss possible non-operative treatment options to include stretching and strengthening exercises as well as different types of steroid  injections/ablations/blocks.     We will also obtain flexion and extention x-rays today. Ms. Tanner is not interested in surgical options at this time.     We also discussed signs of a worsening problem that she should seek being evaluated.        Respectfully,     NATTY Guo PA-C  [facility] Neurosurgery  [facility]  Tel: [phone]    Exam, imaging, and plan reviewed by Dr. Nolasco.       Again, thank you for allowing me to participate in the care of your patient.        Sincerely,        José Carmona PA-C

## 2023-08-22 ENCOUNTER — TELEPHONE (OUTPATIENT)
Dept: ORTHOPEDICS | Facility: CLINIC | Age: 73
End: 2023-08-22

## 2023-08-22 ENCOUNTER — TELEPHONE (OUTPATIENT)
Dept: NEUROSURGERY | Facility: CLINIC | Age: 73
End: 2023-08-22

## 2023-08-22 NOTE — TELEPHONE ENCOUNTER
I received a message from MARIFER Zelaya (neurosurgery) regarding Ms. Renate Tanner.  She has symptomatic lumbosacral facet arthropathy.  I called and spoke with Ms. Renate Tanner.  I discussed performing medial branch/dorsal ramus blocks of the bilateral L4-L5 and L5-S1 facet joints with following radiofrequency ablations.  Ms. Renate Tanner would like to touch base with her daughter Frida Roach RN, who is a nurse at the [employer].  Ms. Renate Tanner will let us know if she would like to proceed with the test blocks.    Ronald Harris MD        I received a call back for Ms. Renate Tanner.  She like to talk to MARIFER Zelaya, before deciding on any treatment options.    Ronald Harris MD

## 2023-08-22 NOTE — TELEPHONE ENCOUNTER
Patient requesting to speak with provider (LUCÍA). Patient stated that she is in confusion whether provider ordered for an injection or just for evaluation.     Call back # [phone]

## 2023-08-23 ENCOUNTER — TELEPHONE (OUTPATIENT)
Dept: ANESTHESIOLOGY | Facility: CLINIC | Age: 73
End: 2023-08-23

## 2023-08-23 DIAGNOSIS — M54.16 LUMBAR RADICULAR SYNDROME: Primary | ICD-10-CM

## 2023-08-23 NOTE — TELEPHONE ENCOUNTER
Patient is scheduled for surgery with Dr. Broderick    Spoke with: patient    Date of Surgery: 09/05/23    Location: [facility]    Informed patient they will need an adult  yes    Additional comments: Patient is aware of date and time of the procedure.        Katelin Overton MA on 8/23/2023 at 12:13 PM

## 2023-08-23 NOTE — TELEPHONE ENCOUNTER
Placed call to pt -    We referred pt to Dr. Harris for eval and discussion of injections. Dr. Harris called pt and offered her medial branch/dorsal ramus blocks of the bilateral L4-L5 and L5-S1 facet joints with following radiofrequency ablations.    Spoke to pt. Pt had ILESI in the past. Pt thinks she is going to go through Dr. Broderick. She will let us know if she needs that referral.

## 2023-09-05 ENCOUNTER — ANCILLARY PROCEDURE (OUTPATIENT)
Dept: RADIOLOGY | Facility: AMBULATORY SURGERY CENTER | Age: 73
End: 2023-09-05
Payer: COMMERCIAL

## 2023-09-05 ENCOUNTER — HOSPITAL ENCOUNTER (OUTPATIENT)
Facility: AMBULATORY SURGERY CENTER | Age: 73
Discharge: HOME | End: 2023-09-05
Payer: COMMERCIAL

## 2023-09-05 VITALS
SYSTOLIC BLOOD PRESSURE: 110 MMHG | RESPIRATION RATE: 18 BRPM | DIASTOLIC BLOOD PRESSURE: 73 MMHG | OXYGEN SATURATION: 100 % | HEART RATE: 76 BPM | TEMPERATURE: 98.2 F

## 2023-09-05 DIAGNOSIS — M54.16 LUMBAR RADICULAR SYNDROME: ICD-10-CM

## 2023-09-05 PROCEDURE — 62323 NJX INTERLAMINAR LMBR/SAC: CPT

## 2023-09-05 RX ORDER — FUROSEMIDE 20 MG
20 TABLET ORAL DAILY
COMMUNITY

## 2023-09-05 RX ORDER — BUPIVACAINE HYDROCHLORIDE 2.5 MG/ML
INJECTION, SOLUTION INFILTRATION; PERINEURAL PRN
Status: DISCONTINUED | OUTPATIENT
Start: 2023-09-05 | End: 2023-09-05 | Stop reason: HOSPADM

## 2023-09-05 RX ORDER — LOSARTAN POTASSIUM 50 MG/1
50 TABLET ORAL DAILY
COMMUNITY

## 2023-09-05 RX ORDER — METHYLPREDNISOLONE ACETATE 40 MG/ML
INJECTION, SUSPENSION INTRA-ARTICULAR; INTRALESIONAL; INTRAMUSCULAR; SOFT TISSUE PRN
Status: DISCONTINUED | OUTPATIENT
Start: 2023-09-05 | End: 2023-09-05 | Stop reason: HOSPADM

## 2023-09-05 RX ORDER — AMIODARONE HYDROCHLORIDE 100 MG/1
100 TABLET ORAL DAILY
Status: ON HOLD | COMMUNITY
End: 2024-08-23

## 2023-09-05 RX ORDER — LIDOCAINE HYDROCHLORIDE 10 MG/ML
INJECTION, SOLUTION EPIDURAL; INFILTRATION; INTRACAUDAL; PERINEURAL PRN
Status: DISCONTINUED | OUTPATIENT
Start: 2023-09-05 | End: 2023-09-05 | Stop reason: HOSPADM

## 2023-09-05 RX ORDER — IOPAMIDOL 408 MG/ML
INJECTION, SOLUTION INTRATHECAL PRN
Status: DISCONTINUED | OUTPATIENT
Start: 2023-09-05 | End: 2023-09-05 | Stop reason: HOSPADM

## 2023-09-05 NOTE — DISCHARGE INSTRUCTIONS
Home Care Instructions after an Epidural Steroid Pain Injection    A lumbar epidural steroid injection delivers steroid medication directly into the area that may be causing your lower back pain and/or leg pain. A cervical or thoracic epidural steroid injection delivers steroids into the epidural space surrounding spinal nerve roots to help relieve pain in the upper spine/neck.    Activity  -Rest today  -Do not work today  -Resume normal activity tomorrow  -DO NOT shower for 24 hours  -DO NOT remove bandaid for 24 hours    Pain  -You may experience soreness at the injection site for one or two days  -You may use an ice pack for 20 minutes every 2 hours for the first 24 hours  -You may use a heating pad after the first 24 hours  -You may use Tylenol (acetaminophen) every 4 hours or other pain medicines as     directed by your physician    You may experience numbness radiating into your legs or arms (depending on the procedure location). This numbness may last several hours. Until sensation returns to normal; please use caution in walking, climbing stairs, and stepping out of your vehicle, etc.    Common side effects of steroids:  Not everyone will experience corticosteroid side effects. If side effects are experienced, they will gradually subside in the 7-10 day period following an injection. Most common side effects include:  -Flushed face and/or chest  -Feeling of warmth, particularly in the face but could be an overall feeling of warmth  -Increased blood sugar in diabetic patients  -Menstrual irregularities my occur. If taking hormone-based birth control an alternate method of birth control is recommended  -Sleep disturbances and/or mood swings are possible  -Leg cramps    Please contact us if you have:  -Severe pain  -Fever more than 101.5 degrees Fahrenheit  -Signs of infection at the injection site (redness, swelling, or drainage)    FOR PAIN CENTER PATIENTS:  If you have questions, please contact the Pain  Clinic at [phone] Option #1 between the hours of 7:00 am and 3:00 pm Monday through Friday. After office hours you can contact the on call provider by dialing [phone]. If you need immediate attention, we recommend that you go to a hospital emergency room or dial [phone].

## 2023-09-19 NOTE — OP NOTE
Caudal KRISHNA with Racz Catheter      The patient s identity, the procedure to be performed and the specific site of the procedure was verified in accordance with [facility] Protocol.     Diagnosis: Lumbar Radicular Syndrome    Pre-Procedure Pain Score (0-10 scale):  4/10  Procedure Note:     Informed consent was obtained.  The patient was positioned comfortably.  The patient was then prepped and draped in a sterile fashion.  There was no evidence of infection at the site of needle insertion.  The skin was then anesthetized with 1% lidocaine.  Under fluoroscopic guidance a 17 gauge R-K needle was placed into the epidural space through a Caudal approach.  A spring-guided Racz catheter was advanced to  the L4 level.  CSF was not aspirated, blood was not aspirated, paresthesia was noted.  The patient tolerated the procedure without complications.  The patient was observed for 30 minutes and was then released with post-procedure instructions.  Radioopaque contrast Isovue 200 was utilized to confirm proper spread of solution.      The patient was given discharge instructions and verbalizes understanding, including understanding of those signs and symptoms that would require emergency care.     Level of catheter tip: L4             Needle Type:  16 ga R.K. Epidural Needle (Oli)  Drug Dose:          40mg methylprednisolone  Diluting Solution:      3 ml Normal Saline,  1    ml  0.25% Bupivacaine    Post procedure pain score: 0/10  Counseling: Greater than 50% of this patient visit was spent in counseling the patient regarding the treatment of their pain, coordinating their overall treatment plan and assessing their progress.

## 2023-10-02 NOTE — TELEPHONE ENCOUNTER
Records Requested     October 2, 2023 9:13 AM   [id number] [facility]   Facility  [facility]   Outcome Faxed request to [phone]    10/03/23 2:44 PM  Called [employer], they are pushing the XR.  XR received, resolved in PACS - Ina       DIAGNOSIS: right knee pain,self,no imaging,[facility]    APPOINTMENT DATE: 10/09/23   NOTES STATUS DETAILS   OFFICE NOTE from referring provider Internal Self   MEDICATION LIST Internal    XRAYS  & INJECTIONS (IMAGES & REPORTS) PACS PACHECO Noelle:  - XR R KNEE - 04/09/18

## 2023-10-05 DIAGNOSIS — M25.561 RIGHT KNEE PAIN, UNSPECIFIED CHRONICITY: Primary | ICD-10-CM

## 2023-10-09 ENCOUNTER — ANCILLARY PROCEDURE (OUTPATIENT)
Dept: GENERAL RADIOLOGY | Facility: CLINIC | Age: 73
End: 2023-10-09
Attending: ORTHOPAEDIC SURGERY
Payer: COMMERCIAL

## 2023-10-09 ENCOUNTER — OFFICE VISIT (OUTPATIENT)
Dept: ORTHOPEDICS | Facility: CLINIC | Age: 73
End: 2023-10-09
Payer: COMMERCIAL

## 2023-10-09 ENCOUNTER — PRE VISIT (OUTPATIENT)
Dept: ORTHOPEDICS | Facility: CLINIC | Age: 73
End: 2023-10-09

## 2023-10-09 VITALS — BODY MASS INDEX: 31.53 KG/M2 | HEIGHT: 61 IN | WEIGHT: 167 LBS

## 2023-10-09 DIAGNOSIS — M25.561 RIGHT KNEE PAIN, UNSPECIFIED CHRONICITY: ICD-10-CM

## 2023-10-09 DIAGNOSIS — M25.561 RIGHT KNEE PAIN, UNSPECIFIED CHRONICITY: Primary | ICD-10-CM

## 2023-10-09 PROCEDURE — 20610 DRAIN/INJ JOINT/BURSA W/O US: CPT | Mod: RT | Performed by: ORTHOPAEDIC SURGERY

## 2023-10-09 PROCEDURE — 73562 X-RAY EXAM OF KNEE 3: CPT | Mod: RT | Performed by: RADIOLOGY

## 2023-10-09 PROCEDURE — 99204 OFFICE O/P NEW MOD 45 MIN: CPT | Mod: 25 | Performed by: ORTHOPAEDIC SURGERY

## 2023-10-09 PROCEDURE — 99207 LARGE JOINT INJECTION: R KNEE JOINT: CPT | Mod: RT | Performed by: ORTHOPAEDIC SURGERY

## 2023-10-09 RX ADMIN — TRIAMCINOLONE ACETONIDE 40 MG: 40 INJECTION, SUSPENSION INTRA-ARTICULAR; INTRAMUSCULAR at 10:22

## 2023-10-09 RX ADMIN — LIDOCAINE HYDROCHLORIDE 8 ML: 5 INJECTION, SOLUTION INFILTRATION; INTRAVENOUS at 10:22

## 2023-10-09 NOTE — NURSING NOTE
[address] [address]  Dept: [phone]  ______________________________________________________________________________    Patient: Renate Tanner   : 1950   MRN: [id number]   2023    INVASIVE PROCEDURE SAFETY CHECKLIST    Date: 10/9/23   Procedure: Right knee kenalog injection  Patient Name: Renate Tanner  MRN: [id number]  YOB: 1950    Action: Complete sections as appropriate. Any discrepancy results in a HARD COPY until resolved.     PRE PROCEDURE:  Patient ID verified with 2 identifiers (name and  or MRN): Yes  Procedure and site verified with patient/designee (when able): Yes  Accurate consent documentation in medical record: Yes  H&P (or appropriate assessment) documented in medical record: Yes  H&P must be up to 20 days prior to procedure and updates within 24 hours of procedure as applicable: NA  Relevant diagnostic and radiology test results appropriately labeled and displayed as applicable: Yes  Procedure site(s) marked with provider initials: NA    TIMEOUT:  Time-Out performed immediately prior to starting procedure, including verbal and active participation of all team members addressing the following:Yes  * Correct patient identify  * Confirmed that the correct side and site are marked  * An accurate procedure consent form  * Agreement on the procedure to be done  * Correct patient position  * Relevant images and results are properly labeled and appropriately displayed  * The need to administer antibiotics or fluids for irrigation purposes during the procedure as applicable   * Safety precautions based on patient history or medication use    DURING PROCEDURE: Verification of correct person, site, and procedures any time the responsibility for care of the patient is transferred to another member of the care team.       Prior to injection, verified patient identity using patient's name and date of  birth.  Due to injection administration, patient instructed to remain in clinic for 15 minutes  afterwards, and to report any adverse reaction to me immediately.    Joint injection was performed.      Drug Amount Wasted:  Yes: 42 mg/ml   Vial/Syringe: Single dose vial  Expiration Date:  07/2024      Luna Kamara ATC  October 9, 2023

## 2023-10-09 NOTE — NURSING NOTE
"Reason For Visit:   Chief Complaint   Patient presents with    Consult     Right knee       ?  No  Occupation: Retired  Currently working? No.  Work status?  Retired.  Date of injury: NA  Type of injury: NA  Date of surgery: None  Type of surgery: None.    She had a previous CSI in June and she didn't get much relief from the injection. She has had constant pain especially with driving.     SANE Score  Left Knee: 70  Right Knee: 60    Pain Assessment  Patient Currently in Pain: Yes  0-10 Pain Scale: 6  Primary Pain Location: Knee    Ht 1.537 m (5' 0.5\")   Wt 75.8 kg (167 lb)   BMI 32.08 kg/m           Allergies   Allergen Reactions    Seasonal Allergies        Current Outpatient Medications   Medication    ascorbic acid (VITAMIN C) 1000 MG TABS    Biotin 5000 MCG TABS    Calcium Carbonate-Vit D-Min (CALCIUM 600+D PLUS MINERALS PO)    fexofenadine (ALLEGRA ALLERGY) 180 MG tablet    furosemide (LASIX) 20 MG tablet    GLUCOSAMINE-CHONDROITIN PO    losartan (COZAAR) 50 MG tablet    Multiple Vitamin (MULTIVITAMINS PO)    rivaroxaban ANTICOAGULANT (XARELTO) 10 MG TABS tablet    traZODone (DESYREL) 50 MG tablet    amiodarone (PACERONE) 100 MG TABS tablet     No current facility-administered medications for this visit.         Luna Kaamra, ATC    "

## 2023-10-09 NOTE — LETTER
10/9/2023         RE: Renate Tanner  [address] [address]        Dear Colleague,    Thank you for referring your patient, Renate Tanner, to the [facility] [place]. Please see a copy of my visit note below.    CHIEF CONCERN: Right knee pain    HISTORY:   Very pleasant 73-year-old woman presents to see me today for her right knee.  She has had pain about her right knee for a number of weeks a month.  She had tried an injection in June and does not feel like she had significant improvement from that.  She describes pain worse with driving.  Gives a SANE score of 60 versus 70 on the contralateral side.  VAS pain score of 6.  It bothers her on a daily basis.  Keeps her from doing the things that she wants to do.  She does not feel interested in surgery.    PAST MEDICAL HISTORY: (Reviewed with the patient and in the [facility] medical record)  Pacemaker    PAST SURGICAL HISTORY: (Reviewed with the patient and in the [facility] medical record)  No knee surgery    MEDICATIONS: (Reviewed with the patient and in the [facility] medical record)    Notable medications include: On Xarelto    ALLERGIES: (Reviewed with the patient and in the EPIC medical record)  Seasonal      SOCIAL HISTORY: (Reviewed with the patient and in the medical record)  --Tobacco: Non-smoker  --Occupation: Retired    FAMILY HISTORY: (Reviewed with the patient and in the medical record)  -- No family history of bleeding, clotting, or difficulty with anesthesia    REVIEW OF SYSTEMS: (Reviewed with the patient and on the health intake form)  -- A comprehensive 10 point review of systems was conducted and is negative except as noted in the HPI    EXAM:     General: Awake, Alert and Oriented, No acute Distress. Articulate and Interactive    Body mass index is 32.08 kg/m .    Right lower extremity :  Skin is Warm and Well perfused, no suggestion of infection  Positive medial joint line tenderness less so  laterally  Lachman 0, no pivot shift  Stable to varus valgus stress testing  1 quadrant medial lateral translation patella tilt to neutral  Range of motion 0 to 125 degrees  EHL/FHL/TA/GS 5/5  Sensation intact L3-S1  2+ Dorsalis Pedis Pulse    IMAGING:    Radiographs of the right knee from today were independently reviewed by me and findings were discussed with the patient today. The imaging demonstrates early joint space noted the medial compartment of the right knee as compared to the left.  Though no full-thickness cartilage loss is present    ASSESSMENT:  Medial compartment arthritis right knee    PLAN:  Long discussion today with the patient.  Reviewed the diagnosis potential treatment options.  Would like to exhaust all nonsurgical things.  We can utilize an injection.    After written informed consent obtained and signed, after sufficient prepping and sterile technique, 40 mg of kenalog and , 8 cc of 1% lidocaine were injected without complication into the right knee. The patient tolerated the injection well and a sterile dressing was applied.     Going forward she can get 2 to 3 injections/year.  No lifetime maximum.  I guess if she ultimately fails nonsurgical intervention may need to consider either MRI imaging of this would require turning off her pacemaker.  She has had a recent spine MRI so she is only able to get 1 would like to avoid this if possible.  Otherwise she may need referral to one of our arthroplasty surgeons      Again, thank you for allowing me to participate in the care of your patient.        Sincerely,        Felipe Delgado MD

## 2023-10-09 NOTE — PROGRESS NOTES
CHIEF CONCERN: Right knee pain    HISTORY:   Very pleasant 73-year-old woman presents to see me today for her right knee.  She has had pain about her right knee for a number of weeks a month.  She had tried an injection in June and does not feel like she had significant improvement from that.  She describes pain worse with driving.  Gives a SANE score of 60 versus 70 on the contralateral side.  VAS pain score of 6.  It bothers her on a daily basis.  Keeps her from doing the things that she wants to do.  She does not feel interested in surgery.    PAST MEDICAL HISTORY: (Reviewed with the patient and in the EPIC medical record)  Pacemaker    PAST SURGICAL HISTORY: (Reviewed with the patient and in the [facility] medical record)  No knee surgery    MEDICATIONS: (Reviewed with the patient and in the EP[facility] medical record)    Notable medications include: On Xarelto    ALLERGIES: (Reviewed with the patient and in the EPIC medical record)  Seasonal      SOCIAL HISTORY: (Reviewed with the patient and in the medical record)  --Tobacco: Non-smoker  --Occupation: Retired    FAMILY HISTORY: (Reviewed with the patient and in the medical record)  -- No family history of bleeding, clotting, or difficulty with anesthesia    REVIEW OF SYSTEMS: (Reviewed with the patient and on the health intake form)  -- A comprehensive 10 point review of systems was conducted and is negative except as noted in the HPI    EXAM:     General: Awake, Alert and Oriented, No acute Distress. Articulate and Interactive    Body mass index is 32.08 kg/m .    Right lower extremity :  Skin is Warm and Well perfused, no suggestion of infection  Positive medial joint line tenderness less so laterally  Lachman 0, no pivot shift  Stable to varus valgus stress testing  1 quadrant medial lateral translation patella tilt to neutral  Range of motion 0 to 125 degrees  EHL/FHL/TA/GS 5/5  Sensation intact L3-S1  2+ Dorsalis Pedis Pulse    IMAGING:    Radiographs of the right  knee from today were independently reviewed by me and findings were discussed with the patient today. The imaging demonstrates early joint space noted the medial compartment of the right knee as compared to the left.  Though no full-thickness cartilage loss is present    ASSESSMENT:  Medial compartment arthritis right knee    PLAN:  Long discussion today with the patient.  Reviewed the diagnosis potential treatment options.  Would like to exhaust all nonsurgical things.  We can utilize an injection.    After written informed consent obtained and signed, after sufficient prepping and sterile technique, 40 mg of kenalog and , 8 cc of 1% lidocaine were injected without complication into the right knee. The patient tolerated the injection well and a sterile dressing was applied.     Going forward she can get 2 to 3 injections/year.  No lifetime maximum.  I guess if she ultimately fails nonsurgical intervention may need to consider either MRI imaging of this would require turning off her pacemaker.  She has had a recent spine MRI so she is only able to get 1 would like to avoid this if possible.  Otherwise she may need referral to one of our arthroplasty surgeons

## 2023-10-09 NOTE — PROGRESS NOTES
CHIEF CONCERN: ***    HISTORY:   ***    PAST MEDICAL HISTORY: (Reviewed with the patient and in the [facility] medical record)  ***    PAST SURGICAL HISTORY: (Reviewed with the patient and in the [facility] medical record)  ***    MEDICATIONS: (Reviewed with the patient and in the [facility] medical record)    Notable medications include: ***    ALLERGIES: (Reviewed with the patient and in the [facility] medical record)  ***      SOCIAL HISTORY: (Reviewed with the patient and in the medical record)  --Tobacco: ***  --Occupation: ***  --Avocation/Sport: ***    FAMILY HISTORY: (Reviewed with the patient and in the medical record)  -- No family history of bleeding, clotting, or difficulty with anesthesia    REVIEW OF SYSTEMS: (Reviewed with the patient and on the health intake form)  -- A comprehensive 10 point review of systems was conducted and is negative except as noted in the HPI    EXAM:     General: Awake, Alert and Oriented, No acute Distress. Articulate and Interactive    Body mass index is 32.08 kg/m .    *** Lower extremity :  Skin is Warm and Well perfused, no suggestion of infection  ***  ***  ***  ***  ***  EHL/FHL/TA/GS 5/5  Sensation intact L3-S1  2+ Dorsalis Pedis Pulse    IMAGING:    Radiographs of the *** knee from *** were independently reviewed by me and findings were discussed with the patient today. The imaging demonstrates ***.    MRI of the *** knee from *** were independently reviewed by me and findings were discussed with the patient today. The imaging demonstrates ***.    ASSESSMENT:  ***    PLAN:  ***    Large Joint Injection: R knee joint    Date/Time: 10/9/2023 10:37 AM    Performed by: Felipe Delgado MD  Authorized by: Felipe Delgado MD    Indications:  Pain  Needle Size comment:  23g  Guidance: landmark guided    Approach:  Anterolateral  Location:  Knee      Medications:  40 mg triamcinolone 40 MG/ML; 8 mL lidocaine (PF) 0.5 %  Outcome:  Tolerated well, no immediate  complications  Procedure discussed: discussed risks, benefits, and alternatives    Consent Given by:  Patient  Timeout: timeout called immediately prior to procedure    Prep: patient was prepped and draped in usual sterile fashion

## 2023-10-13 RX ORDER — LIDOCAINE HYDROCHLORIDE 5 MG/ML
8 INJECTION, SOLUTION INFILTRATION; INTRAVENOUS
Status: DISCONTINUED | OUTPATIENT
Start: 2023-10-09 | End: 2023-10-09

## 2023-10-13 RX ORDER — TRIAMCINOLONE ACETONIDE 40 MG/ML
40 INJECTION, SUSPENSION INTRA-ARTICULAR; INTRAMUSCULAR
Status: DISCONTINUED | OUTPATIENT
Start: 2023-10-09 | End: 2023-10-09

## 2023-10-13 NOTE — PROGRESS NOTES
Large Joint Injection: R knee joint    Date/Time: 10/9/2023 10:22 AM    Performed by: Felipe Delgado MD  Authorized by: Felipe Delgado MD    Indications:  Pain and osteoarthritis  Needle Size comment:  23G  Guidance: landmark guided    Approach:  Anterolateral  Location:  Knee      Medications:  40 mg triamcinolone 40 MG/ML; 8 mL lidocaine (PF) 0.5 %  Outcome:  Tolerated well, no immediate complications  Procedure discussed: discussed risks, benefits, and alternatives    Consent Given by:  Patient  Timeout: timeout called immediately prior to procedure    Prep: patient was prepped and draped in usual sterile fashion

## 2024-01-05 ENCOUNTER — ANCILLARY PROCEDURE (OUTPATIENT)
Dept: MAMMOGRAPHY | Facility: CLINIC | Age: 74
End: 2024-01-05
Attending: FAMILY MEDICINE
Payer: COMMERCIAL

## 2024-01-05 DIAGNOSIS — Z12.31 VISIT FOR SCREENING MAMMOGRAM: ICD-10-CM

## 2024-01-05 PROCEDURE — 77063 BREAST TOMOSYNTHESIS BI: CPT

## 2024-04-27 ENCOUNTER — HEALTH MAINTENANCE LETTER (OUTPATIENT)
Age: 74
End: 2024-04-27

## 2024-08-23 ENCOUNTER — APPOINTMENT (OUTPATIENT)
Dept: GENERAL RADIOLOGY | Facility: CLINIC | Age: 74
End: 2024-08-23
Attending: EMERGENCY MEDICINE
Payer: COMMERCIAL

## 2024-08-23 ENCOUNTER — ANCILLARY PROCEDURE (OUTPATIENT)
Dept: CARDIOLOGY | Facility: CLINIC | Age: 74
End: 2024-08-23
Attending: EMERGENCY MEDICINE
Payer: COMMERCIAL

## 2024-08-23 ENCOUNTER — HOSPITAL ENCOUNTER (INPATIENT)
Facility: CLINIC | Age: 74
LOS: 2 days | Discharge: HOME | DRG: 253 | End: 2024-08-26
Attending: STUDENT IN AN ORGANIZED HEALTH CARE EDUCATION/TRAINING PROGRAM | Admitting: INTERNAL MEDICINE
Payer: COMMERCIAL

## 2024-08-23 ENCOUNTER — HOSPITAL ENCOUNTER (EMERGENCY)
Facility: CLINIC | Age: 74
Discharge: HOME | End: 2024-08-23
Attending: EMERGENCY MEDICINE | Admitting: EMERGENCY MEDICINE
Payer: COMMERCIAL

## 2024-08-23 ENCOUNTER — APPOINTMENT (OUTPATIENT)
Dept: CT IMAGING | Facility: CLINIC | Age: 74
End: 2024-08-23
Attending: EMERGENCY MEDICINE
Payer: COMMERCIAL

## 2024-08-23 VITALS
DIASTOLIC BLOOD PRESSURE: 66 MMHG | HEART RATE: 113 BPM | SYSTOLIC BLOOD PRESSURE: 101 MMHG | RESPIRATION RATE: 16 BRPM | OXYGEN SATURATION: 97 % | TEMPERATURE: 97.9 F | WEIGHT: 167 LBS | BODY MASS INDEX: 32.08 KG/M2

## 2024-08-23 DIAGNOSIS — I48.91 ATRIAL FIBRILLATION WITH RVR (H): Primary | ICD-10-CM

## 2024-08-23 DIAGNOSIS — I48.91 ATRIAL FIBRILLATION WITH RAPID VENTRICULAR RESPONSE (H): ICD-10-CM

## 2024-08-23 DIAGNOSIS — Z95.0 CARDIAC PACEMAKER IN SITU: ICD-10-CM

## 2024-08-23 DIAGNOSIS — R68.83 CHILLS: ICD-10-CM

## 2024-08-23 DIAGNOSIS — M31.6 GIANT CELL ARTERITIS (H): ICD-10-CM

## 2024-08-23 DIAGNOSIS — R51.9 LEFT TEMPORAL HEADACHE: ICD-10-CM

## 2024-08-23 DIAGNOSIS — R11.0 NAUSEA: ICD-10-CM

## 2024-08-23 LAB
ALBUMIN SERPL BCG-MCNC: 3.7 G/DL (ref 3.5–5.2)
ALBUMIN UR-MCNC: NEGATIVE MG/DL
ALP SERPL-CCNC: 136 U/L (ref 40–150)
ALT SERPL W P-5'-P-CCNC: 96 U/L (ref 0–50)
ANION GAP SERPL CALCULATED.3IONS-SCNC: 14 MMOL/L (ref 7–15)
APPEARANCE UR: CLEAR
APTT PPP: 66 SECONDS (ref 22–38)
AST SERPL W P-5'-P-CCNC: 96 U/L (ref 0–45)
ATRIAL RATE - MUSE: 117 BPM
ATRIAL RATE - MUSE: 156 BPM
BASOPHILS # BLD AUTO: 0 10E3/UL (ref 0–0.2)
BASOPHILS NFR BLD AUTO: 0 %
BILIRUB SERPL-MCNC: 0.4 MG/DL
BILIRUB UR QL STRIP: NEGATIVE
BUN SERPL-MCNC: 12.5 MG/DL (ref 8–23)
C PNEUM DNA SPEC QL NAA+PROBE: NOT DETECTED
CALCIUM SERPL-MCNC: 9 MG/DL (ref 8.8–10.4)
CHLORIDE SERPL-SCNC: 100 MMOL/L (ref 98–107)
COLOR UR AUTO: ABNORMAL
CREAT SERPL-MCNC: 0.65 MG/DL (ref 0.51–0.95)
CRP SERPL-MCNC: 172 MG/L
DIASTOLIC BLOOD PRESSURE - MUSE: NORMAL MMHG
DIASTOLIC BLOOD PRESSURE - MUSE: NORMAL MMHG
EGFRCR SERPLBLD CKD-EPI 2021: >90 ML/MIN/1.73M2
EOSINOPHIL # BLD AUTO: 0.1 10E3/UL (ref 0–0.7)
EOSINOPHIL NFR BLD AUTO: 1 %
ERYTHROCYTE [DISTWIDTH] IN BLOOD BY AUTOMATED COUNT: 13.7 % (ref 10–15)
ERYTHROCYTE [SEDIMENTATION RATE] IN BLOOD BY WESTERGREN METHOD: 39 MM/HR (ref 0–30)
FLUAV H1 2009 PAND RNA SPEC QL NAA+PROBE: NOT DETECTED
FLUAV H1 RNA SPEC QL NAA+PROBE: NOT DETECTED
FLUAV H3 RNA SPEC QL NAA+PROBE: NOT DETECTED
FLUAV RNA SPEC QL NAA+PROBE: NOT DETECTED
FLUBV RNA SPEC QL NAA+PROBE: NOT DETECTED
GLUCOSE SERPL-MCNC: 107 MG/DL (ref 70–99)
GLUCOSE UR STRIP-MCNC: NEGATIVE MG/DL
HADV DNA SPEC QL NAA+PROBE: NOT DETECTED
HCO3 SERPL-SCNC: 23 MMOL/L (ref 22–29)
HCOV PNL SPEC NAA+PROBE: NOT DETECTED
HCT VFR BLD AUTO: 38.9 % (ref 35–47)
HGB BLD-MCNC: 13.4 G/DL (ref 11.7–15.7)
HGB UR QL STRIP: NEGATIVE
HMPV RNA SPEC QL NAA+PROBE: NOT DETECTED
HPIV1 RNA SPEC QL NAA+PROBE: NOT DETECTED
HPIV2 RNA SPEC QL NAA+PROBE: NOT DETECTED
HPIV3 RNA SPEC QL NAA+PROBE: NOT DETECTED
HPIV4 RNA SPEC QL NAA+PROBE: NOT DETECTED
HYALINE CASTS: 2 /LPF
IMM GRANULOCYTES # BLD: 0.1 10E3/UL
IMM GRANULOCYTES NFR BLD: 1 %
INR PPP: 3.37 (ref 0.85–1.15)
INTERPRETATION ECG - MUSE: NORMAL
INTERPRETATION ECG - MUSE: NORMAL
KETONES UR STRIP-MCNC: NEGATIVE MG/DL
LEUKOCYTE ESTERASE UR QL STRIP: NEGATIVE
LIPASE SERPL-CCNC: 53 U/L (ref 13–60)
LYMPHOCYTES # BLD AUTO: 1.3 10E3/UL (ref 0.8–5.3)
LYMPHOCYTES NFR BLD AUTO: 13 %
M PNEUMO DNA SPEC QL NAA+PROBE: NOT DETECTED
MCH RBC QN AUTO: 33.3 PG (ref 26.5–33)
MCHC RBC AUTO-ENTMCNC: 34.4 G/DL (ref 31.5–36.5)
MCV RBC AUTO: 97 FL (ref 78–100)
MONOCYTES # BLD AUTO: 2 10E3/UL (ref 0–1.3)
MONOCYTES NFR BLD AUTO: 20 %
MUCOUS THREADS #/AREA URNS LPF: PRESENT /LPF
NEUTROPHILS # BLD AUTO: 6.5 10E3/UL (ref 1.6–8.3)
NEUTROPHILS NFR BLD AUTO: 65 %
NITRATE UR QL: NEGATIVE
NRBC # BLD AUTO: 0 10E3/UL
NRBC BLD AUTO-RTO: 0 /100
NT-PROBNP SERPL-MCNC: 725 PG/ML (ref 0–900)
P AXIS - MUSE: 69 DEGREES
P AXIS - MUSE: NORMAL DEGREES
PH UR STRIP: 6.5 [PH] (ref 5–7)
PLATELET # BLD AUTO: 227 10E3/UL (ref 150–450)
POTASSIUM SERPL-SCNC: 4 MMOL/L (ref 3.4–5.3)
PR INTERVAL - MUSE: 148 MS
PR INTERVAL - MUSE: NORMAL MS
PROT SERPL-MCNC: 6.8 G/DL (ref 6.4–8.3)
QRS DURATION - MUSE: 60 MS
QRS DURATION - MUSE: 82 MS
QT - MUSE: 264 MS
QT - MUSE: 288 MS
QTC - MUSE: 422 MS
QTC - MUSE: 490 MS
R AXIS - MUSE: -33 DEGREES
R AXIS - MUSE: -34 DEGREES
RBC # BLD AUTO: 4.02 10E6/UL (ref 3.8–5.2)
RBC URINE: 1 /HPF
RSV RNA SPEC QL NAA+PROBE: NOT DETECTED
RSV RNA SPEC QL NAA+PROBE: NOT DETECTED
RV+EV RNA SPEC QL NAA+PROBE: NOT DETECTED
SARS-COV-2 RNA RESP QL NAA+PROBE: NEGATIVE
SODIUM SERPL-SCNC: 137 MMOL/L (ref 135–145)
SP GR UR STRIP: 1.01 (ref 1–1.03)
SQUAMOUS EPITHELIAL: <1 /HPF
SYSTOLIC BLOOD PRESSURE - MUSE: NORMAL MMHG
SYSTOLIC BLOOD PRESSURE - MUSE: NORMAL MMHG
T AXIS - MUSE: 13 DEGREES
T AXIS - MUSE: 28 DEGREES
TROPONIN T SERPL HS-MCNC: 21 NG/L
TROPONIN T SERPL HS-MCNC: 27 NG/L
TSH SERPL DL<=0.005 MIU/L-ACNC: 2.55 UIU/ML (ref 0.3–4.2)
UROBILINOGEN UR STRIP-MCNC: NORMAL MG/DL
VENTRICULAR RATE- MUSE: 154 BPM
VENTRICULAR RATE- MUSE: 174 BPM
WBC # BLD AUTO: 9.9 10E3/UL (ref 4–11)
WBC URINE: 1 /HPF

## 2024-08-23 PROCEDURE — 87633 RESP VIRUS 12-25 TARGETS: CPT | Performed by: EMERGENCY MEDICINE

## 2024-08-23 PROCEDURE — 36415 COLL VENOUS BLD VENIPUNCTURE: CPT | Performed by: EMERGENCY MEDICINE

## 2024-08-23 PROCEDURE — 71046 X-RAY EXAM CHEST 2 VIEWS: CPT

## 2024-08-23 PROCEDURE — 96365 THER/PROPH/DIAG IV INF INIT: CPT

## 2024-08-23 PROCEDURE — G0378 HOSPITAL OBSERVATION PER HR: HCPCS

## 2024-08-23 PROCEDURE — G0379 DIRECT REFER HOSPITAL OBSERV: HCPCS

## 2024-08-23 PROCEDURE — 99291 CRITICAL CARE FIRST HOUR: CPT | Mod: 25 | Performed by: EMERGENCY MEDICINE

## 2024-08-23 PROCEDURE — 93294 REM INTERROG EVL PM/LDLS PM: CPT | Performed by: INTERNAL MEDICINE

## 2024-08-23 PROCEDURE — 83880 ASSAY OF NATRIURETIC PEPTIDE: CPT | Performed by: EMERGENCY MEDICINE

## 2024-08-23 PROCEDURE — 250N000013 HC RX MED GY IP 250 OP 250 PS 637: Performed by: INTERNAL MEDICINE

## 2024-08-23 PROCEDURE — 99291 CRITICAL CARE FIRST HOUR: CPT | Performed by: EMERGENCY MEDICINE

## 2024-08-23 PROCEDURE — 70450 CT HEAD/BRAIN W/O DYE: CPT | Mod: 26 | Performed by: RADIOLOGY

## 2024-08-23 PROCEDURE — 258N000003 HC RX IP 258 OP 636: Performed by: INTERNAL MEDICINE

## 2024-08-23 PROCEDURE — 250N000012 HC RX MED GY IP 250 OP 636 PS 637: Performed by: INTERNAL MEDICINE

## 2024-08-23 PROCEDURE — 85730 THROMBOPLASTIN TIME PARTIAL: CPT | Performed by: EMERGENCY MEDICINE

## 2024-08-23 PROCEDURE — 70450 CT HEAD/BRAIN W/O DYE: CPT

## 2024-08-23 PROCEDURE — 93005 ELECTROCARDIOGRAM TRACING: CPT | Performed by: EMERGENCY MEDICINE

## 2024-08-23 PROCEDURE — 250N000009 HC RX 250: Performed by: INTERNAL MEDICINE

## 2024-08-23 PROCEDURE — 93010 ELECTROCARDIOGRAM REPORT: CPT | Performed by: EMERGENCY MEDICINE

## 2024-08-23 PROCEDURE — 87635 SARS-COV-2 COVID-19 AMP PRB: CPT | Performed by: INTERNAL MEDICINE

## 2024-08-23 PROCEDURE — 99223 1ST HOSP IP/OBS HIGH 75: CPT | Performed by: INTERNAL MEDICINE

## 2024-08-23 PROCEDURE — 96361 HYDRATE IV INFUSION ADD-ON: CPT | Performed by: EMERGENCY MEDICINE

## 2024-08-23 PROCEDURE — 96366 THER/PROPH/DIAG IV INF ADDON: CPT

## 2024-08-23 PROCEDURE — 83690 ASSAY OF LIPASE: CPT | Performed by: EMERGENCY MEDICINE

## 2024-08-23 PROCEDURE — 258N000003 HC RX IP 258 OP 636: Performed by: EMERGENCY MEDICINE

## 2024-08-23 PROCEDURE — 96374 THER/PROPH/DIAG INJ IV PUSH: CPT | Performed by: EMERGENCY MEDICINE

## 2024-08-23 PROCEDURE — 80053 COMPREHEN METABOLIC PANEL: CPT | Performed by: EMERGENCY MEDICINE

## 2024-08-23 PROCEDURE — 71046 X-RAY EXAM CHEST 2 VIEWS: CPT | Mod: 26 | Performed by: RADIOLOGY

## 2024-08-23 PROCEDURE — 93296 REM INTERROG EVL PM/IDS: CPT

## 2024-08-23 PROCEDURE — 250N000009 HC RX 250: Performed by: EMERGENCY MEDICINE

## 2024-08-23 PROCEDURE — 96376 TX/PRO/DX INJ SAME DRUG ADON: CPT | Performed by: EMERGENCY MEDICINE

## 2024-08-23 PROCEDURE — 84484 ASSAY OF TROPONIN QUANT: CPT | Performed by: EMERGENCY MEDICINE

## 2024-08-23 PROCEDURE — 250N000013 HC RX MED GY IP 250 OP 250 PS 637: Performed by: EMERGENCY MEDICINE

## 2024-08-23 PROCEDURE — 85610 PROTHROMBIN TIME: CPT | Performed by: EMERGENCY MEDICINE

## 2024-08-23 PROCEDURE — 85025 COMPLETE CBC W/AUTO DIFF WBC: CPT | Performed by: EMERGENCY MEDICINE

## 2024-08-23 PROCEDURE — 85652 RBC SED RATE AUTOMATED: CPT | Performed by: EMERGENCY MEDICINE

## 2024-08-23 PROCEDURE — 84443 ASSAY THYROID STIM HORMONE: CPT | Performed by: EMERGENCY MEDICINE

## 2024-08-23 PROCEDURE — 86140 C-REACTIVE PROTEIN: CPT | Performed by: EMERGENCY MEDICINE

## 2024-08-23 PROCEDURE — 81001 URINALYSIS AUTO W/SCOPE: CPT | Performed by: EMERGENCY MEDICINE

## 2024-08-23 RX ORDER — ONDANSETRON 2 MG/ML
4 INJECTION INTRAMUSCULAR; INTRAVENOUS EVERY 6 HOURS PRN
Status: DISCONTINUED | OUTPATIENT
Start: 2024-08-23 | End: 2024-08-26 | Stop reason: HOSPADM

## 2024-08-23 RX ORDER — ANTIOX #8/OM3/DHA/EPA/LUT/ZEAX 250-2.5 MG
1 CAPSULE ORAL 2 TIMES DAILY
COMMUNITY

## 2024-08-23 RX ORDER — METOPROLOL TARTRATE 1 MG/ML
5 INJECTION, SOLUTION INTRAVENOUS ONCE
Status: COMPLETED | OUTPATIENT
Start: 2024-08-23 | End: 2024-08-23

## 2024-08-23 RX ORDER — LOSARTAN POTASSIUM 50 MG/1
50 TABLET ORAL DAILY
Status: DISCONTINUED | OUTPATIENT
Start: 2024-08-24 | End: 2024-08-26 | Stop reason: HOSPADM

## 2024-08-23 RX ORDER — CHLORAL HYDRATE 500 MG
1 CAPSULE ORAL DAILY
COMMUNITY

## 2024-08-23 RX ORDER — MULTIVITAMIN,THERAPEUTIC
1 TABLET ORAL DAILY
COMMUNITY

## 2024-08-23 RX ORDER — CARBOXYMETHYLCELLULOSE SODIUM 5 MG/ML
1 SOLUTION/ DROPS OPHTHALMIC 4 TIMES DAILY PRN
Status: DISCONTINUED | OUTPATIENT
Start: 2024-08-23 | End: 2024-08-26 | Stop reason: HOSPADM

## 2024-08-23 RX ORDER — TRAZODONE HYDROCHLORIDE 100 MG/1
100 TABLET ORAL AT BEDTIME
Status: DISCONTINUED | OUTPATIENT
Start: 2024-08-23 | End: 2024-08-26 | Stop reason: HOSPADM

## 2024-08-23 RX ORDER — HYDROCODONE BITARTRATE AND ACETAMINOPHEN 5; 325 MG/1; MG/1
.5-1 TABLET ORAL DAILY PRN
COMMUNITY
Start: 2024-04-29

## 2024-08-23 RX ORDER — NALOXONE HYDROCHLORIDE 0.4 MG/ML
0.2 INJECTION, SOLUTION INTRAMUSCULAR; INTRAVENOUS; SUBCUTANEOUS
Status: DISCONTINUED | OUTPATIENT
Start: 2024-08-23 | End: 2024-08-26 | Stop reason: HOSPADM

## 2024-08-23 RX ORDER — SIMETHICONE 125 MG
125 TABLET,CHEWABLE ORAL 2 TIMES DAILY
COMMUNITY

## 2024-08-23 RX ORDER — CARBOXYMETHYLCELLULOSE SODIUM 5 MG/ML
1 SOLUTION/ DROPS OPHTHALMIC 4 TIMES DAILY PRN
COMMUNITY

## 2024-08-23 RX ORDER — AMOXICILLIN 250 MG
1 CAPSULE ORAL 2 TIMES DAILY PRN
Status: DISCONTINUED | OUTPATIENT
Start: 2024-08-23 | End: 2024-08-26 | Stop reason: HOSPADM

## 2024-08-23 RX ORDER — METOPROLOL TARTRATE 1 MG/ML
2.5 INJECTION, SOLUTION INTRAVENOUS EVERY 4 HOURS PRN
Status: DISCONTINUED | OUTPATIENT
Start: 2024-08-23 | End: 2024-08-23

## 2024-08-23 RX ORDER — NALOXONE HYDROCHLORIDE 0.4 MG/ML
0.4 INJECTION, SOLUTION INTRAMUSCULAR; INTRAVENOUS; SUBCUTANEOUS
Status: DISCONTINUED | OUTPATIENT
Start: 2024-08-23 | End: 2024-08-26 | Stop reason: HOSPADM

## 2024-08-23 RX ORDER — METOPROLOL TARTRATE 25 MG/1
25 TABLET, FILM COATED ORAL ONCE
Status: COMPLETED | OUTPATIENT
Start: 2024-08-23 | End: 2024-08-23

## 2024-08-23 RX ORDER — FUROSEMIDE 20 MG
20 TABLET ORAL DAILY
Status: DISCONTINUED | OUTPATIENT
Start: 2024-08-24 | End: 2024-08-26 | Stop reason: HOSPADM

## 2024-08-23 RX ORDER — TRAZODONE HYDROCHLORIDE 50 MG/1
100 TABLET, FILM COATED ORAL AT BEDTIME
COMMUNITY

## 2024-08-23 RX ORDER — ROSUVASTATIN CALCIUM 20 MG/1
20 TABLET, COATED ORAL EVERY EVENING
COMMUNITY
Start: 2024-06-21

## 2024-08-23 RX ORDER — ALENDRONATE SODIUM 70 MG/1
70 TABLET ORAL
COMMUNITY

## 2024-08-23 RX ORDER — PREDNISONE 20 MG/1
60 TABLET ORAL DAILY
Status: DISCONTINUED | OUTPATIENT
Start: 2024-08-23 | End: 2024-08-26 | Stop reason: HOSPADM

## 2024-08-23 RX ORDER — ONDANSETRON 4 MG/1
4 TABLET, ORALLY DISINTEGRATING ORAL EVERY 6 HOURS PRN
Status: DISCONTINUED | OUTPATIENT
Start: 2024-08-23 | End: 2024-08-26 | Stop reason: HOSPADM

## 2024-08-23 RX ORDER — AMOXICILLIN 250 MG
2 CAPSULE ORAL 2 TIMES DAILY PRN
Status: DISCONTINUED | OUTPATIENT
Start: 2024-08-23 | End: 2024-08-26 | Stop reason: HOSPADM

## 2024-08-23 RX ORDER — ROSUVASTATIN CALCIUM 20 MG/1
20 TABLET, COATED ORAL EVERY EVENING
Status: DISCONTINUED | OUTPATIENT
Start: 2024-08-23 | End: 2024-08-26 | Stop reason: HOSPADM

## 2024-08-23 RX ORDER — OXYCODONE HYDROCHLORIDE 5 MG/1
5 TABLET ORAL EVERY 4 HOURS PRN
Status: DISCONTINUED | OUTPATIENT
Start: 2024-08-23 | End: 2024-08-26 | Stop reason: HOSPADM

## 2024-08-23 RX ORDER — HYDROMORPHONE HYDROCHLORIDE 1 MG/ML
0.3 INJECTION, SOLUTION INTRAMUSCULAR; INTRAVENOUS; SUBCUTANEOUS
Status: DISCONTINUED | OUTPATIENT
Start: 2024-08-23 | End: 2024-08-26 | Stop reason: HOSPADM

## 2024-08-23 RX ORDER — GABAPENTIN 300 MG/1
300 CAPSULE ORAL ONCE
Status: COMPLETED | OUTPATIENT
Start: 2024-08-23 | End: 2024-08-23

## 2024-08-23 RX ADMIN — TRAZODONE HYDROCHLORIDE 100 MG: 100 TABLET ORAL at 21:34

## 2024-08-23 RX ADMIN — METOPROLOL TARTRATE 25 MG: 25 TABLET, FILM COATED ORAL at 13:10

## 2024-08-23 RX ADMIN — ROSUVASTATIN CALCIUM 20 MG: 20 TABLET, FILM COATED ORAL at 21:33

## 2024-08-23 RX ADMIN — METOPROLOL TARTRATE 5 MG: 5 INJECTION INTRAVENOUS at 12:05

## 2024-08-23 RX ADMIN — OXYCODONE HYDROCHLORIDE 5 MG: 5 TABLET ORAL at 20:44

## 2024-08-23 RX ADMIN — DILTIAZEM HYDROCHLORIDE 5 MG/HR: 5 INJECTION, SOLUTION INTRAVENOUS at 21:54

## 2024-08-23 RX ADMIN — GABAPENTIN 300 MG: 300 CAPSULE ORAL at 20:44

## 2024-08-23 RX ADMIN — METOPROLOL TARTRATE 5 MG: 5 INJECTION INTRAVENOUS at 15:56

## 2024-08-23 RX ADMIN — SODIUM CHLORIDE, POTASSIUM CHLORIDE, SODIUM LACTATE AND CALCIUM CHLORIDE 500 ML: 600; 310; 30; 20 INJECTION, SOLUTION INTRAVENOUS at 11:31

## 2024-08-23 RX ADMIN — METOPROLOL TARTRATE 25 MG: 25 TABLET, FILM COATED ORAL at 11:29

## 2024-08-23 RX ADMIN — PREDNISONE 60 MG: 20 TABLET ORAL at 21:33

## 2024-08-23 ASSESSMENT — ACTIVITIES OF DAILY LIVING (ADL)
ADLS_ACUITY_SCORE: 35
ADLS_ACUITY_SCORE: 35
ADLS_ACUITY_SCORE: 32
ADLS_ACUITY_SCORE: 32
ADLS_ACUITY_SCORE: 35
ADLS_ACUITY_SCORE: 35
ADLS_ACUITY_SCORE: 32
ADLS_ACUITY_SCORE: 35
ADLS_ACUITY_SCORE: 32
ADLS_ACUITY_SCORE: 35
ADLS_ACUITY_SCORE: 35
ADLS_ACUITY_SCORE: 32
ADLS_ACUITY_SCORE: 35
ADLS_ACUITY_SCORE: 35

## 2024-08-23 ASSESSMENT — COLUMBIA-SUICIDE SEVERITY RATING SCALE - C-SSRS
2. HAVE YOU ACTUALLY HAD ANY THOUGHTS OF KILLING YOURSELF IN THE PAST MONTH?: NO
6. HAVE YOU EVER DONE ANYTHING, STARTED TO DO ANYTHING, OR PREPARED TO DO ANYTHING TO END YOUR LIFE?: NO
1. IN THE PAST MONTH, HAVE YOU WISHED YOU WERE DEAD OR WISHED YOU COULD GO TO SLEEP AND NOT WAKE UP?: NO

## 2024-08-23 NOTE — PROGRESS NOTES
Transfer Type: [facility]  Transfer Triage Note    Date of call: 08/23/24  Time of call: 2:29 PM    Current Patient Location:  [facility] ED  Current Level of Care: Med Surg    Vitals: Temp: 97.9  F (36.6  C) Temp src: Oral BP: 109/76 Pulse: (!) 126   Resp: 16 SpO2: 97 %   Weight: 75.8 kg (167 lb)  O2 Device: None (Room air) at    Diagnosis: Afib with RVR, elevated CRP  Reason for requested transfer:  Bed availability    Isolation Needs: None    Care everywhere has been updated and reviewed: Yes  Necessary images have been sent through PACS: Yes    If patient is transferring for specialty care or specific procedure, the specialist required has participated in the transfer call and agreed with need for transfer and anticipated timeline: No    Transfer accepted: Yes  Stability of Patient: Patient is at risk for instability, however patient requires urgent transfer and does not meet ICU criteria   Is the patient appropriate for [facility]? No, What specific [facility] needs are anticipated? [facility] transfer  Level of Care Needed: Med Surg  Telemetry Needed:  Cardiac Telemetry  Expected Time of Arrival for Transfer: 0-8 hours  Arrival Location:  [facility]     Recommendations for Management and Stabilization: Not needed    Additional Comments: Pt with hx of Afib presented with 5 days of shooting temporal pain. She is in AFib with RVR, ED discussed with Cards at [facility], ok for admission to hospitalist team. Pt does have elevated CRP but no temporal tenderness, vision changes or jaw claudication to suggest GCA. Has received IV pushes of lopressor    Estevan Briseno MD

## 2024-08-23 NOTE — ED PROVIDER NOTES
"    [facility] ([facility])    8/23/24       ED PROVIDER NOTE   ED 4  History     Chief Complaint   Patient presents with    Cough    Jaw Pain    Nausea     The history is provided by the patient and medical records.     Renate Tanner is a 73 year old female with A-fib on Xarelto who presents to the emergency department with 5 days of left-sided shooting temporal pain radiating to her left jaw.  Denies chest pain, shortness of breath, vomiting or diarrhea.  Also endorses anorexia/nausea.  On occasion pain focused in her left temple, shoots for less than a minute, and is unrelated to visual symptoms.  At baseline patient has AMD and gets ophthalmologic injections.  Patient also reports \"feeling sick\" with chills and night sweats occasionally. She did have some recent COVID exposure though has tested negative several times.  No recent travel, chest pain or fevers.       Physical Exam   BP: 101/64  Pulse: 89  Temp: 97.9  F (36.6  C)  Resp: 16  Weight: 75.8 kg (167 lb)  SpO2: 96 %    Physical Exam    Overall nontoxic-appearing breathing comfortably   Pupils equal reactive to light   No trismus, no tenderness on either temple   Heart regular rate and rhythm   Lungs clear   Abdomen soft, nontender, nondistended   Legs with no pitting edema     ED Course, Procedures, & Data     ED Course as of 08/23/24 1351   Fri Aug 23, 2024   0937 Received abnormal EKG. Patient roomed and will be on cardiac monitor.      Procedures            EKG Interpretation:      Interpreted by Wellington Pretty MD  Time reviewed: 936b  Symptoms at time of EKG: left jaw pain   Rhythm: atrial fib  Rate: Tachycardia  Axis: Normal  Ectopy: none  ST Segments/ T Waves: No acute ischemic changes    Clinical Impression: Irregular narrow complex tachycardia.  Atrial fibrillation.  No acute ischemic changes.  Similar morphology to July 1, 2022             Results for orders placed or performed during the hospital encounter of 08/23/24 "   XR Chest 2 Views     Status: None    Narrative    Examination: XR CHEST 2 VIEWS 8/23/2024 9:54 AM    Indication: jaw pain    Comparison: None    Findings:  PA and lateral view of the chest. Left chest wall dual-lead pacer  device. Pacer leads are grossly intact. Trachea is midline. Normal  cardiac silhouette. No significant pleural effusion. No pneumothorax.  No focal consolidation. No definite abnormal airspace opacities.  Partially visualized lumbar fusion hardware. No acute osseous  abnormality. Degenerative changes of the visualized spine.  Unremarkable visualized upper abdomen.      Impression    Impression: No acute cardiopulmonary process radiographically.    I have personally reviewed the examination and initial interpretation  and I agree with the findings.    ARNIE MCCORD MD         SYSTEM ID:  [id number]   CT Head w/o Contrast     Status: None    Narrative    EXAM: CT HEAD W/O CONTRAST  8/23/2024 10:06 AM     PROVIDED HISTORY:  L head pain; Headache; Acute HA (< 3 months), no  complicating features       COMPARISON:  None available.    TECHNIQUE: Using multidetector thin collimation helical acquisition  technique, axial, coronal and sagittal CT images from the skull base  to the vertex were obtained without intravenous contrast.   (topogram) image(s) also obtained and reviewed.    FINDINGS:  No acute intracranial hemorrhage, mass effect, or midline shift. No  acute loss of gray-white matter differentiation in the cerebral  hemispheres. Ventricles are proportionate to the cerebral sulci and  normal for age. Clear basal cisterns.    The bony calvaria and the bones of the skull base are normal. The  visualized portions of the paranasal sinuses and mastoid air cells are  clear. Grossly normal orbits.       Impression    IMPRESSION: No acute intracranial pathology.     TAYA HAMPTON MD         SYSTEM ID:  [id number]   Comprehensive metabolic panel     Status: Abnormal   Result Value Ref Range    Sodium  137 135 - 145 mmol/L    Potassium 4.0 3.4 - 5.3 mmol/L    Carbon Dioxide (CO2) 23 22 - 29 mmol/L    Anion Gap 14 7 - 15 mmol/L    Urea Nitrogen 12.5 8.0 - 23.0 mg/dL    Creatinine 0.65 0.51 - 0.95 mg/dL    GFR Estimate >90 >60 mL/min/1.73m2    Calcium 9.0 8.8 - 10.4 mg/dL    Chloride 100 98 - 107 mmol/L    Glucose 107 (H) 70 - 99 mg/dL    Alkaline Phosphatase 136 40 - 150 U/L    AST 96 (H) 0 - 45 U/L    ALT 96 (H) 0 - 50 U/L    Protein Total 6.8 6.4 - 8.3 g/dL    Albumin 3.7 3.5 - 5.2 g/dL    Bilirubin Total 0.4 <=1.2 mg/dL   Lipase     Status: Normal   Result Value Ref Range    Lipase 53 13 - 60 U/L   Troponin T, High Sensitivity     Status: Abnormal   Result Value Ref Range    Troponin T, High Sensitivity 27 (H) <=14 ng/L   TSH with free T4 reflex     Status: Normal   Result Value Ref Range    TSH 2.55 0.30 - 4.20 uIU/mL   Nt probnp inpatient (BNP)     Status: Normal   Result Value Ref Range    N terminal Pro BNP Inpatient 725 0 - 900 pg/mL   CRP inflammation     Status: Abnormal   Result Value Ref Range    CRP Inflammation 172.00 (H) <5.00 mg/L   Erythrocyte sedimentation rate auto     Status: Abnormal   Result Value Ref Range    Erythrocyte Sedimentation Rate 39 (H) 0 - 30 mm/hr   INR     Status: Abnormal   Result Value Ref Range    INR 3.37 (H) 0.85 - 1.15   Partial thromboplastin time     Status: Abnormal   Result Value Ref Range    aPTT 66 (H) 22 - 38 Seconds   UA with Microscopic reflex to Culture     Status: Abnormal    Specimen: Urine, Midstream   Result Value Ref Range    Color Urine Light Yellow Colorless, Straw, Light Yellow, Yellow    Appearance Urine Clear Clear    Glucose Urine Negative Negative mg/dL    Bilirubin Urine Negative Negative    Ketones Urine Negative Negative mg/dL    Specific Gravity Urine 1.007 1.003 - 1.035    Blood Urine Negative Negative    pH Urine 6.5 5.0 - 7.0    Protein Albumin Urine Negative Negative mg/dL    Urobilinogen Urine Normal Normal, 2.0 mg/dL    Nitrite Urine Negative  Negative    Leukocyte Esterase Urine Negative Negative    Mucus Urine Present (A) None Seen /LPF    RBC Urine 1 <=2 /HPF    WBC Urine 1 <=5 /HPF    Squamous Epithelials Urine <1 <=1 /HPF    Hyaline Casts Urine 2 <=2 /LPF    Narrative    Urine Culture not indicated   CBC with platelets and differential     Status: Abnormal   Result Value Ref Range    WBC Count 9.9 4.0 - 11.0 10e3/uL    RBC Count 4.02 3.80 - 5.20 10e6/uL    Hemoglobin 13.4 11.7 - 15.7 g/dL    Hematocrit 38.9 35.0 - 47.0 %    MCV 97 78 - 100 fL    MCH 33.3 (H) 26.5 - 33.0 pg    MCHC 34.4 31.5 - 36.5 g/dL    RDW 13.7 10.0 - 15.0 %    Platelet Count 227 150 - 450 10e3/uL    % Neutrophils 65 %    % Lymphocytes 13 %    % Monocytes 20 %    % Eosinophils 1 %    % Basophils 0 %    % Immature Granulocytes 1 %    NRBCs per 100 WBC 0 <1 /100    Absolute Neutrophils 6.5 1.6 - 8.3 10e3/uL    Absolute Lymphocytes 1.3 0.8 - 5.3 10e3/uL    Absolute Monocytes 2.0 (H) 0.0 - 1.3 10e3/uL    Absolute Eosinophils 0.1 0.0 - 0.7 10e3/uL    Absolute Basophils 0.0 0.0 - 0.2 10e3/uL    Absolute Immature Granulocytes 0.1 <=0.4 10e3/uL    Absolute NRBCs 0.0 10e3/uL   Troponin T, High Sensitivity     Status: Abnormal   Result Value Ref Range    Troponin T, High Sensitivity 21 (H) <=14 ng/L   EKG 12-lead     Status: None   Result Value Ref Range    Systolic Blood Pressure  mmHg    Diastolic Blood Pressure  mmHg    Ventricular Rate 154 BPM    Atrial Rate 156 BPM    GA Interval 148 ms    QRS Duration 82 ms     ms    QTc 422 ms    P Axis 69 degrees    R AXIS -33 degrees    T Axis 28 degrees    Interpretation ECG       Sinus tachycardia with Premature supraventricular complexes  Left axis deviation  Low voltage QRS  Septal infarct , age undetermined  Abnormal ECG  Unconfirmed report - interpretation of this ECG is computer generated - see medical record for final interpretation  Confirmed by - EMERGENCY ROOM, PHYSICIAN ([id number]),  VENICE MCCRAY ([id number]) on 8/23/2024 1:08:25  PM     EKG 12-lead, tracing only     Status: None   Result Value Ref Range    Systolic Blood Pressure  mmHg    Diastolic Blood Pressure  mmHg    Ventricular Rate 174 BPM    Atrial Rate 117 BPM    TX Interval  ms    QRS Duration 60 ms     ms    QTc 490 ms    P Axis  degrees    R AXIS -34 degrees    T Axis 13 degrees    Interpretation ECG       Undetermined rhythm  Left axis deviation  Low voltage QRS  Inferior infarct , age undetermined  Cannot rule out Anteroseptal infarct , age undetermined  Abnormal ECG  Unconfirmed report - interpretation of this ECG is computer generated - see medical record for final interpretation  Confirmed by - EMERGENCY ROOM, PHYSICIAN ([id number]),  VENICE MCCRAY ([id number]) on 8/23/2024 1:08:40 PM     Respiratory Panel PCR     Status: Normal    Specimen: Nasopharyngeal; Swab   Result Value Ref Range    Adenovirus Not Detected Not Detected    Coronavirus Not Detected Not Detected    Human Metapneumovirus Not Detected Not Detected    Human Rhin/Enterovirus Not Detected Not Detected    Influenza A Not Detected Not Detected    Influenza A, H1 Not Detected Not Detected    Influenza A 2009 H1N1 Not Detected Not Detected    Influenza A, H3 Not Detected Not Detected    Influenza B Not Detected Not Detected    Parainfluenza Virus 1 Not Detected Not Detected    Parainfluenza Virus 2 Not Detected Not Detected    Parainfluenza Virus 3 Not Detected Not Detected    Parainfluenza Virus 4 Not Detected Not Detected    Respiratory Syncytial Virus A Not Detected Not Detected    Respiratory Syncytial Virus B Not Detected Not Detected    Chlamydia Pneumoniae Not Detected Not Detected    Mycoplasma Pneumoniae Not Detected Not Detected    Narrative    The [employer] Respiratory Panel is a qualitative nucleic acid, multiplex, in vitro diagnostic test for the simultaneous detection and identification of multiple respiratory viral and bacterial nucleic acids in nasopharyngeal swabs collected in viral transport  media from individual exhibiting signs and symptoms of respiratory infection. The assay has received FDA approval for the testing of nasopharyngeal (NP) swabs only. This test is used for clinical purposes and should not be regarded as investigational or for research. This laboratory is certified under the Clinical Laboratory Improvement Amendments of 1988 (CLIA-88) as qualified to perform high complexity clinical laboratory testing.   Cardiac Device Check - Remote     Status: None (In process)   Result Value Ref Range    Implantable Pulse Generator  Medtronic     Implantable Pulse Generator Model [id number] Margarita XT DR FAN     Implantable Pulse Generator Serial Number [id number]     Type Interrogation Session Remote     Clinic Name [facility]     Implantable Pulse Generator Type Pacemaker     Implantable Pulse Generator Implant Date 20221031     Implantable Lead  Medtronic     Implantable Lead Model 3830 SelectSecure MRI SureScan     Implantable Lead Serial Number [id number]     Implantable Lead Implant Date 20221031     Implantable Lead Polarity Type Bipolar Lead     Implantable Lead Location Detail 1 UNKNOWN     Implantable Lead Location Right Atrium     Implantable Lead Connection Status Connected     Implantable Lead  Medtronic     Implantable Lead Model 3830 SelectSecure MRI SureScan     Implantable Lead Serial Number [id number]     Implantable Lead Implant Date 20221031     Implantable Lead Polarity Type Bipolar Lead     Implantable Lead Location Detail 1 UNKNOWN     Implantable Lead Location Right Ventricle     Implantable Lead Connection Status Connected     Date Time Interrogation Session [id number]     Narrative    Remote pacemaker transmission received and reviewed. Device transmission sent per MD orders.    Device: Medtronic [id number] Margarita XT DR FAN  Normal Device Function  Mode: AAIR<>DDDR  bpm  AP: 24.5%  : <0.1%  Presenting EGM: AF w/  RVR.   Short V-V intervals: 0  Lead Trends Appear Stable.  Estimated battery longevity to RRT = 13.1 years. Battery voltage = 3.03 V.   Atrial Arrhythmia: AT/AF episode started 3:55 am, in progress.  AF [place]: 0.4%  Anticoagulant: Xarelto  Ventricular Arrhythmia: 6 NSVT episdoes lasting 1-2 sec @ 188-200 bpm.  Pt Notified of Transmission Results: ED called with results    SAJI Phillips RN    Remote pacemaker transmission    I have reviewed and interpreted the device interrogation, settings, programming and nurse's summary. The device is functioning within normal device parameters. I agree with the current findings, assessment and plan.   CBC with platelets differential     Status: Abnormal    Narrative    The following orders were created for panel order CBC with platelets differential.  Procedure                               Abnormality         Status                     ---------                               -----------         ------                     CBC with platelets and d...[[id number]]  Abnormal            Final result                 Please view results for these tests on the individual orders.     Medications   metoprolol tartrate (LOPRESSOR) tablet 25 mg (25 mg Oral $Given 8/23/24 1129)   lactated ringers BOLUS 500 mL (0 mLs Intravenous Stopped 8/23/24 1257)   metoprolol (LOPRESSOR) injection 5 mg (5 mg Intravenous $Given 8/23/24 1205)   metoprolol tartrate (LOPRESSOR) tablet 25 mg (25 mg Oral $Given 8/23/24 1310)     Labs Ordered and Resulted from Time of ED Arrival to Time of ED Departure   COMPREHENSIVE METABOLIC PANEL - Abnormal       Result Value    Sodium 137      Potassium 4.0      Carbon Dioxide (CO2) 23      Anion Gap 14      Urea Nitrogen 12.5      Creatinine 0.65      GFR Estimate >90      Calcium 9.0      Chloride 100      Glucose 107 (*)     Alkaline Phosphatase 136      AST 96 (*)     ALT 96 (*)     Protein Total 6.8      Albumin 3.7      Bilirubin Total 0.4     TROPONIN T, HIGH  SENSITIVITY - Abnormal    Troponin T, High Sensitivity 27 (*)    CRP INFLAMMATION - Abnormal    CRP Inflammation 172.00 (*)    ERYTHROCYTE SEDIMENTATION RATE AUTO - Abnormal    Erythrocyte Sedimentation Rate 39 (*)    INR - Abnormal    INR 3.37 (*)    PARTIAL THROMBOPLASTIN TIME - Abnormal    aPTT 66 (*)    ROUTINE UA WITH MICROSCOPIC REFLEX TO CULTURE - Abnormal    Color Urine Light Yellow      Appearance Urine Clear      Glucose Urine Negative      Bilirubin Urine Negative      Ketones Urine Negative      Specific Gravity Urine 1.007      Blood Urine Negative      pH Urine 6.5      Protein Albumin Urine Negative      Urobilinogen Urine Normal      Nitrite Urine Negative      Leukocyte Esterase Urine Negative      Mucus Urine Present (*)     RBC Urine 1      WBC Urine 1      Squamous Epithelials Urine <1      Hyaline Casts Urine 2     CBC WITH PLATELETS AND DIFFERENTIAL - Abnormal    WBC Count 9.9      RBC Count 4.02      Hemoglobin 13.4      Hematocrit 38.9      MCV 97      MCH 33.3 (*)     MCHC 34.4      RDW 13.7      Platelet Count 227      % Neutrophils 65      % Lymphocytes 13      % Monocytes 20      % Eosinophils 1      % Basophils 0      % Immature Granulocytes 1      NRBCs per 100 WBC 0      Absolute Neutrophils 6.5      Absolute Lymphocytes 1.3      Absolute Monocytes 2.0 (*)     Absolute Eosinophils 0.1      Absolute Basophils 0.0      Absolute Immature Granulocytes 0.1      Absolute NRBCs 0.0     TROPONIN T, HIGH SENSITIVITY - Abnormal    Troponin T, High Sensitivity 21 (*)    LIPASE - Normal    Lipase 53     TSH WITH FREE T4 REFLEX - Normal    TSH 2.55     NT PROBNP INPATIENT - Normal    N terminal Pro BNP Inpatient 725     RESPIRATORY PANEL PCR - Normal    Adenovirus Not Detected      Coronavirus Not Detected      Human Metapneumovirus Not Detected      Human Rhin/Enterovirus Not Detected      Influenza A Not Detected      Influenza A, H1 Not Detected      Influenza A 2009 H1N1 Not Detected       Influenza A, H3 Not Detected      Influenza B Not Detected      Parainfluenza Virus 1 Not Detected      Parainfluenza Virus 2 Not Detected      Parainfluenza Virus 3 Not Detected      Parainfluenza Virus 4 Not Detected      Respiratory Syncytial Virus A Not Detected      Respiratory Syncytial Virus B Not Detected      Chlamydia Pneumoniae Not Detected      Mycoplasma Pneumoniae Not Detected       Cardiac Device Check - Remote         CT Head w/o Contrast   Final Result   IMPRESSION: No acute intracranial pathology.       TAYA HAMPTON MD            SYSTEM ID:  [id number]      XR Chest 2 Views   Final Result   Impression: No acute cardiopulmonary process radiographically.      I have personally reviewed the examination and initial interpretation   and I agree with the findings.      ARNIE MCCORD MD            SYSTEM ID:  [id number]      Cardiac Device Check - Inpatient    (Results Pending)          Critical Care Addendum  My initial assessment, based on my focused history, physical exam, review of cardiac rhythm monitor, and 12 lead ECG analysis, established a high suspicion that Renate Tanner has  atrial fibrillation with RVR , which requires immediate intervention, and therefore she is critically ill.     After the initial assessment, the care team initiated multiple lab tests and initiated medication therapy with iv beta blocker/ccb  to provide stabilization care. Due to the critical nature of this patient, I reassessed vital signs, physical exam, review of cardiac rhythm monitor, 12 lead ECG analysis, neurologic status, and respiratory status multiple times prior to her disposition.     Time also spent performing documentation, discussion with family to obtain medical information for decision making, reviewing test results, and coordination of care.     Critical care time (excluding teaching time and procedures): 35 minutes.       Assessment & Plan    Atypical left temporal headache/jaw pain in setting of, RVR  with A-fib.   Given anticoagulation and headache, will rule out intracranial bleed   Will give IV beta-blocker or calcium channel blocker followed by oral medication.   Interrogate pacemaker.     -Chest x-ray, CT head, labs including troponin, BNP, TSH    10:15 AM;  I have independently reviewed the chest x-ray showing blunted costophrenic angle on the left side indicating either pleural effusion or pneumonia.  CT head still pending, as well as laboratory test, patient placed on cardiac monitor to screen for other arrhythmias      1:45 PM -serial troponins slightly above normal but not increasing and stable.  After 2 oral doses and 1 IV dose of metoprolol, patient continues with A-fib with RVR between 100s to 130s.  Otherwise hemodynamically stable.  BNP slightly elevated without signs or symptoms of pulmonary edema.  Pacemaker has been interrogated showing A-fib and RVR.  Will admit for cardiac monitoring, medication titration, potential cardiology consult and intervention    2:15 PM -phone conversation with system operations center, self they will base cardiologist who understands capacity reason for admission to [facility] and would believe this is an appropriate admission to [facility] under hospital medicine.      I have reviewed the nursing notes. I have reviewed the findings, diagnosis, plan and need for follow up with the patient.    New Prescriptions    No medications on file       Final diagnoses:   Left temporal headache   Cardiac pacemaker in situ   Chills   Nausea   Atrial fibrillation with rapid ventricular response (H)       Wellington Pretty MD   [facility] EMERGENCY DEPARTMENT  8/23/2024           Wellington Pretty MD  08/23/24 1021       Wellington Pretty MD  08/23/24 1352       Wellington Pretty MD  08/23/24 1417

## 2024-08-23 NOTE — ED TRIAGE NOTES
Triage Assessment & Note:    /64   Pulse 89   Temp 97.9  F (36.6  C) (Oral)   Resp 16   Wt 75.8 kg (167 lb)   SpO2 96%   BMI 32.08 kg/m        Patient presents with: Cardiac pt with pacemaker (Medtronic) comes ambulatory to triage with reports of left jaw pain, cough, SOB, and some nausea. Pt recently exposed to COVID (tested negative several times). No reports of fever, CP, or travel.     Home Treatments/Remedies: Home medications    Febrile / Afebrile: afebrile    Duration of C/o: several days    Jazzmine Bonilla RN  August 23, 2024

## 2024-08-24 LAB
ALBUMIN SERPL BCG-MCNC: 3.4 G/DL (ref 3.5–5.2)
ALP SERPL-CCNC: 136 U/L (ref 40–150)
ALT SERPL W P-5'-P-CCNC: 95 U/L (ref 0–50)
ANION GAP SERPL CALCULATED.3IONS-SCNC: 12 MMOL/L (ref 7–15)
AST SERPL W P-5'-P-CCNC: 78 U/L (ref 0–45)
BILIRUB DIRECT SERPL-MCNC: <0.2 MG/DL (ref 0–0.3)
BILIRUB SERPL-MCNC: 0.3 MG/DL
BUN SERPL-MCNC: 11.1 MG/DL (ref 8–23)
CALCIUM SERPL-MCNC: 8.9 MG/DL (ref 8.8–10.4)
CHLORIDE SERPL-SCNC: 100 MMOL/L (ref 98–107)
CREAT SERPL-MCNC: 0.55 MG/DL (ref 0.51–0.95)
CRP SERPL-MCNC: 136.32 MG/L
EGFRCR SERPLBLD CKD-EPI 2021: >90 ML/MIN/1.73M2
ERYTHROCYTE [DISTWIDTH] IN BLOOD BY AUTOMATED COUNT: 13.4 % (ref 10–15)
GLUCOSE SERPL-MCNC: 137 MG/DL (ref 70–99)
HCO3 SERPL-SCNC: 22 MMOL/L (ref 22–29)
HCT VFR BLD AUTO: 38.3 % (ref 35–47)
HGB BLD-MCNC: 13 G/DL (ref 11.7–15.7)
MCH RBC QN AUTO: 32.5 PG (ref 26.5–33)
MCHC RBC AUTO-ENTMCNC: 33.9 G/DL (ref 31.5–36.5)
MCV RBC AUTO: 96 FL (ref 78–100)
PLATELET # BLD AUTO: 258 10E3/UL (ref 150–450)
POTASSIUM SERPL-SCNC: 4.4 MMOL/L (ref 3.4–5.3)
PROT SERPL-MCNC: 6.5 G/DL (ref 6.4–8.3)
RBC # BLD AUTO: 4 10E6/UL (ref 3.8–5.2)
SODIUM SERPL-SCNC: 134 MMOL/L (ref 135–145)
WBC # BLD AUTO: 7.8 10E3/UL (ref 4–11)

## 2024-08-24 PROCEDURE — 999N000128 HC STATISTIC PERIPHERAL IV START W/O US GUIDANCE

## 2024-08-24 PROCEDURE — 210N000002 HC R&B HEART CARE

## 2024-08-24 PROCEDURE — 99231 SBSQ HOSP IP/OBS SF/LOW 25: CPT | Performed by: SURGERY

## 2024-08-24 PROCEDURE — 999N000040 HC STATISTIC CONSULT NO CHARGE VASC ACCESS

## 2024-08-24 PROCEDURE — 250N000012 HC RX MED GY IP 250 OP 636 PS 637: Performed by: INTERNAL MEDICINE

## 2024-08-24 PROCEDURE — 96366 THER/PROPH/DIAG IV INF ADDON: CPT

## 2024-08-24 PROCEDURE — 80053 COMPREHEN METABOLIC PANEL: CPT | Performed by: INTERNAL MEDICINE

## 2024-08-24 PROCEDURE — 85027 COMPLETE CBC AUTOMATED: CPT | Performed by: INTERNAL MEDICINE

## 2024-08-24 PROCEDURE — 36415 COLL VENOUS BLD VENIPUNCTURE: CPT | Performed by: INTERNAL MEDICINE

## 2024-08-24 PROCEDURE — 86140 C-REACTIVE PROTEIN: CPT | Performed by: INTERNAL MEDICINE

## 2024-08-24 PROCEDURE — 258N000003 HC RX IP 258 OP 636: Performed by: INTERNAL MEDICINE

## 2024-08-24 PROCEDURE — G0378 HOSPITAL OBSERVATION PER HR: HCPCS

## 2024-08-24 PROCEDURE — 99223 1ST HOSP IP/OBS HIGH 75: CPT | Performed by: INTERNAL MEDICINE

## 2024-08-24 PROCEDURE — 250N000011 HC RX IP 250 OP 636: Performed by: INTERNAL MEDICINE

## 2024-08-24 PROCEDURE — 96375 TX/PRO/DX INJ NEW DRUG ADDON: CPT

## 2024-08-24 PROCEDURE — 99233 SBSQ HOSP IP/OBS HIGH 50: CPT | Performed by: INTERNAL MEDICINE

## 2024-08-24 PROCEDURE — 250N000013 HC RX MED GY IP 250 OP 250 PS 637: Performed by: INTERNAL MEDICINE

## 2024-08-24 RX ADMIN — AMIODARONE HYDROCHLORIDE 1 MG/MIN: 50 INJECTION, SOLUTION INTRAVENOUS at 14:04

## 2024-08-24 RX ADMIN — TRAZODONE HYDROCHLORIDE 100 MG: 100 TABLET ORAL at 22:00

## 2024-08-24 RX ADMIN — AMIODARONE HYDROCHLORIDE 150 MG: 1.5 INJECTION, SOLUTION INTRAVENOUS at 13:52

## 2024-08-24 RX ADMIN — RIVAROXABAN 20 MG: 20 TABLET, FILM COATED ORAL at 11:54

## 2024-08-24 RX ADMIN — PREDNISONE 60 MG: 20 TABLET ORAL at 09:08

## 2024-08-24 RX ADMIN — FUROSEMIDE 20 MG: 20 TABLET ORAL at 09:07

## 2024-08-24 RX ADMIN — ROSUVASTATIN CALCIUM 20 MG: 20 TABLET, FILM COATED ORAL at 21:59

## 2024-08-24 ASSESSMENT — ACTIVITIES OF DAILY LIVING (ADL)
ADLS_ACUITY_SCORE: 21
CONCENTRATING,_REMEMBERING_OR_MAKING_DECISIONS_DIFFICULTY: NO
ADLS_ACUITY_SCORE: 32
DOING_ERRANDS_INDEPENDENTLY_DIFFICULTY: NO
ADLS_ACUITY_SCORE: 32
FALL_HISTORY_WITHIN_LAST_SIX_MONTHS: NO
ADLS_ACUITY_SCORE: 32
ADLS_ACUITY_SCORE: 21
ADLS_ACUITY_SCORE: 21
DRESSING/BATHING_DIFFICULTY: NO
TOILETING_ISSUES: NO
ADLS_ACUITY_SCORE: 32
CHANGE_IN_FUNCTIONAL_STATUS_SINCE_ONSET_OF_CURRENT_ILLNESS/INJURY: NO
WALKING_OR_CLIMBING_STAIRS_DIFFICULTY: NO
DIFFICULTY_EATING/SWALLOWING: NO
ADLS_ACUITY_SCORE: 32
ADLS_ACUITY_SCORE: 32
ADLS_ACUITY_SCORE: 21
ADLS_ACUITY_SCORE: 32
ADLS_ACUITY_SCORE: 21
ADLS_ACUITY_SCORE: 32

## 2024-08-24 NOTE — CONSULTS
VASCULAR SURGERY    Asked to see 73-year-old patient in CCU due to rapid atrial fibrillation.  Had complained of severe left-sided headache beginning approximately 4 days ago.  No neurological focal issues.    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  Has been started on high-dose prednisone as appropriate.  Will need a temporal artery biopsy but this can be done anytime within the next week once other medical conditions are stabilized--since already on the appropriate treatment with steroids.    Discussed with patient.       Young French MD

## 2024-08-24 NOTE — PROGRESS NOTES
[facility]    Medicine Progress Note - Hospitalist Service    Date of Admission:  8/23/2024    Assessment & Plan   Renate Tanner is a 73 year old female with PMH of atrial fibrillation s/p ablation and pacemaker, NICMO, HTN, HLD, BAMBI not on CPAP, age-related macular degeneration, who presents with headache and atrial fibrillation with RVR.    Upper respiratory illness  Patient reports onset of cough, chills, flu like symptoms four days ago, tested negative for COVID-19 at home. In the ED she had a negative respiratory viral panel, but COVID-19  PCR was not tested. Her CXR does not show any pneumonia. Her CRP is markedly elevated to 174, which would be higher than expected for her clinical COVID severity (not on oxygen, CXR relatively clear).  - COVID-19 neg  - Trend CRP x2 days (172--136)  - Supportive care    Severe left temporal headache, improved  Rule out giant cell arteritis  Patient reports three days of a severe left temporal headache, she does not have headaches normally. She reports throbbing headache with some lightning type pain. It is associated with mild jaw claudication (eating seems to irritate the area). She denies vision changes however she has age-related macular degeneration. On exam she does not tenderness to palpation over her left temporal area, but she curiously has two small nodular lesions in the anterior temporal region. Finally she has a markedly elevated CRP of 174 and ESR is 39. While COVID (if diagnosed) can elevated CRP, the severity of patient's COVID could not explain a high CRP of 174. I think that the pre-test likelihood of giant cell arteritis is high enough to initiate low dose steroids tonight, and consider patient for temporal artery biopsy  - CT head negative  - Started prednisone 60mg daily  - Neurology consult-appreciated  - Vascular surgery consult-plan for temporal artery biopsy on Monday?  - PTA Xarelto given today, may need to hold it on 8/25  -  Other differential diagnosis includes trigeminal neuralgia  - Pain control with tylenol, gabapentin 300mg x1 dose, oxycodone PRN, dilaudid IV PRN  - Follow-up with neurology clinic in 3 to 4 weeks    Symptomatic Atrial fibrillation with RVR  History of ablation and pacemaker  Elevated troponins, likely demand ischemia  Follows with [facility]. She has been previously on sotalol and prolonged amiodarone therapy, it looks like last given 9/2023. She has a pacemaker placed. Interrogation of pacemaker at ED reportedly showed onset of afib in AM of 8/23. HR 140s, SBP 120s. Did not respond to metoprolol IV in the ED. Respiratory illness of vasculitis above could both potentially be underlying patient's afib.   - Started on diltiazem drip on admission  - Telemetry- heart rate still in 120s in a.m., BP on lower side  - Serial troponins 27--21-nonischemic pattern  - Cardiology consult appreciated  - Cardizem drip discontinued and started on amiodarone infusion  - May need cardioversion    Elevated LFTs: Both AST and ALT aare elevated to 90s. UNclear etiology, patietn denies abdominal pain, no clear CHF. This could be related to COVID if diagnosed, or some other etiology.  - ALT 96--95, AST 96--78  - Recheck and work up further pending trend    NICMO  HTN, HLD  [PTA on losartan 50 mg p.o. daily and Lasix 20 mg p.o. daily]  Patient does not appear to be in CHF. Last echo 6/2023 with Ef 60%.  - Continue PTA lasix  - PTA losartan on hold given soft BPs while on Cardizem drip    BAMBI not on CPAP. Noted.  - Continue PTA trazodone for sleep    Age related macular degeneration. Noted. PTA euyedrops       Observation Goals: -diagnostic tests and consults completed and resulted, -vital signs normal or at patient baseline, Nurse to notify provider when observation goals have been met and patient is ready for discharge.  Diet: NPO per Anesthesia Guidelines for Procedure/Surgery Except for: Meds    DVT Prophylaxis: DOAC  Elena  Catheter: Not present  Lines: None     Cardiac Monitoring: None  Code Status: Full Code      Clinically Significant Risk Factors Present on Admission              # Hypoalbuminemia: Lowest albumin = 3.4 g/dL at 8/24/2024  6:08 AM, will monitor as appropriate  # Drug Induced Coagulation Defect: home medication list includes an anticoagulant medication    # Hypertension: Home medication list includes antihypertensive(s)         # Obesity: Estimated body mass index is 33.47 kg/m  as calculated from the following:    Height as of this encounter: 1.524 m (5').    Weight as of this encounter: 77.7 kg (171 lb 6.4 oz).        # Pacemaker present         Disposition Plan     Medically Ready for Discharge: Anticipated in 2-4 Days; given multiple acute medical problems, currently on amiodarone drip in an attempt to control her heart rate, possible plan for cardioversion and temporal artery biopsy, I think that she should be switched to inpatient status; discussed with the care coordinator.             Lety Estes MD  Hospitalist Service  [facility]  Securely message with [employer] (more info)  Text page via [employer] Paging/Directory   ______________________________________________________________________    Interval History   Feeling better today, headache improved  Reports some shortness of breath with activity, no chest pain, no palpitations  Denies nausea, no vomiting, no abdominal pain  No dizziness  Discussed with her daughter with RN at bedside; daughter shared with me that her mom might become anxious if she is going to stay overnight  Discussed with bedside RN    Physical Exam   Vital Signs: Temp: 98.9  F (37.2  C) Temp src: Oral BP: 99/67 Pulse: (!) 139   Resp: 18 SpO2: 96 % O2 Device: None (Room air)    Weight: 171 lbs 6.4 oz    General Appearance: Awake/alert, no acute distress  Respiratory: Bilateral air entry, no wheezing, no rales, no crackles  Cardiovascular: Irregularly irregular,  tachycardia, no murmurs  GI: Abdomen is soft, nontender, bowel sounds are present  Skin: No rashes, no cyanosis  Neuro: AAOx3, no focal neurological deficits    Medical Decision Making       55 MINUTES SPENT BY ME on the date of service doing chart review, history, exam, documentation & further activities per the note.  MANAGEMENT DISCUSSED with the following over the past 24 hours: The patient, her daughter, RN   NOTE(S)/MEDICAL RECORDS REVIEWED over the past 24 hours: Cardiology, vascular surgery and neurology note  Tests REVIEWED in the past 24 hours:  - BMP  - LFTs  - CRP      Data     I have personally reviewed the following data over the past 24 hrs:    7.8  \   13.0   / 258     134 (L) 100 11.1 /  137 (H)   4.4 22 0.55 \     ALT: 95 (H) AST: 78 (H) AP: 136 TBILI: 0.3   ALB: 3.4 (L) TOT PROTEIN: 6.5 LIPASE: N/A     Procal: N/A CRP: 136.32 (H) Lactic Acid: N/A         Imaging results reviewed over the past 24 hrs:   No results found for this or any previous visit (from the past 24 hour(s)).

## 2024-08-24 NOTE — PROVIDER NOTIFICATION
MD Notification    Notified Person: MD    Notified Person Name: Dr Tim Ferguson    Notification Date/Time:08/24/2024, 0240    Notification Interaction: [employer] messaging    Purpose of Notification: Pt is on dilt gtt for Afib RVR. Had to turn off gtt for sys BP of 88. HR still in 120s sustained. Will try to turn on again once bp improves. Will keep you advised.    Orders Received: okay, thanks    Comments:

## 2024-08-24 NOTE — PHARMACY-ADMISSION MEDICATION HISTORY
Pharmacist Admission Medication History    Admission medication history is complete. The information provided in this note is only as accurate as the sources available at the time of the update.    Information Source(s): Patient and CareEverywhere/SureScripts via in-person    Pertinent Information: None    Changes made to PTA medication list:  Added: all to list   Deleted: amiodarone - Rx from 2023  Changed: None    Allergies reviewed with patient and updates made in EHR: yes    Medication History Completed By: Berna Parnell RP 8/23/2024 7:06 PM    PTA Med List   Medication Sig Last Dose    alendronate (FOSAMAX) 70 MG tablet Take 70 mg by mouth every 7 days. 8/20/2024    ascorbic acid (VITAMIN C) 1000 MG TABS Take 1,000 mg by mouth daily 8/23/2024    Biotin 5000 MCG TABS Take 3 tablets by mouth daily. 8/23/2024    Calcium Carbonate-Vit D-Min (CALCIUM 600+D PLUS MINERALS PO) Take 1 tablet by mouth daily 8/23/2024    carboxymethylcellulose PF (REFRESH PLUS) 0.5 % ophthalmic solution Place 1 drop into both eyes 4 times daily as needed for dry eyes.  at prn    fexofenadine (ALLEGRA ALLERGY) 180 MG tablet Take 180 mg by mouth daily 8/23/2024    fish oil-omega-3 fatty acids 1000 MG capsule Take 1 g by mouth daily. 8/23/2024    furosemide (LASIX) 20 MG tablet Take 20 mg by mouth daily 8/23/2024    GLUCOSAMINE-CHONDROITIN PO Take 1 tablet by mouth daily. 1500-1200mg 8/23/2024    HEMP OIL OR EXTRACT OR OTHER CBD CANNABINOID, NOT MEDICAL CANNABIS, Take 1 capsule by mouth every evening. CBD capsule 8/22/2024    HYDROcodone-acetaminophen (NORCO) 5-325 MG tablet Take 0.5-1 tablets by mouth daily as needed for breakthrough pain. More than a month at prn    losartan (COZAAR) 50 MG tablet Take 50 mg by mouth daily 8/23/2024    Multiple Vitamins-Minerals (PRESERVISION AREDS 2) CAPS Take 1 capsule by mouth 2 times daily. 8/23/2024 at am x1    multivitamin, therapeutic (THERA-VIT) TABS tablet Take 1 tablet by mouth daily.  8/23/2024    rivaroxaban ANTICOAGULANT (XARELTO) 20 MG TABS tablet Take 20 mg by mouth daily. 8/23/2024 at am    rosuvastatin (CRESTOR) 20 MG tablet Take 20 mg by mouth every evening. 8/22/2024    simethicone (MYLICON) 125 MG chewable tablet Take 125 mg by mouth 2 times daily. 8/23/2024 at am x1    traZODone (DESYREL) 50 MG tablet Take 100 mg by mouth at bedtime. 8/22/2024

## 2024-08-24 NOTE — PLAN OF CARE
Goal Outcome Evaluation:      Plan of Care Reviewed With: patient, family      Up in the room with stand by assist heart rate low 100's after amiodarone drip, blood pressure soft. Continue to monitor.

## 2024-08-24 NOTE — CONSULTS
CARDIAC ELECTROPHYSIOLOGY CONSULTATION  August 24, 2024    REQUESTING PROVIDER:  Dr REKHA Franklin    REASON FOR CONSULTATION: atrial fibrillation with RVR      HISTORY OF PRESENT ILLNESS:    73-year-old female with history of HTN, dyslipidemia, coronary artery calcification, mild BAMBI (treatment was not recommended), SSS (dual-chamber [employer] pacemaker implantation in 10/2022), atrial fibrillation (follows at [facility], previous treatment with sotalol, later amiodarone, catheter ablation: wide area PVI in 05/2023 by Dr. Rio Ramírez; currently on rivaroxaban, no AA drug.)    She apparently had AF shortly after her EP procedure; she was treated with amiodarone for a few weeks; it was later stopped with no further AF recurrence.     Unfortunately, she went in AF with RVR which apparently started yesterday ~4 am (according to her pacemaker). She was in rapid AF when she presented to the ER with a severe headache. Temporal arteritis is suspected based on her symptoms, elevated ESR and markedly elevated CRP. In the preceding days she also had cough, viral symptoms. Had exposure to COVID but tested negative for COVID-19.     She was seen by vascular surgery and temporal artery biopsy is being considered for Monday.    She is currently hemodynamically stable, in the CICU, in AF with heart rate in the 120s to 140s. She is mildly aware of the AF but becomes winded during exertion. She takes Xarelto in the morning and took a dose yesterday at home.    Non-smoker, minimal alcohol use.      DIAGNOSTIC STUDIES:  Labs: inflammation , ESR 39, TSH 2.55, sodium 134, potassium 4.4, creatinine 0.55, calcium 8.9, hematocrit 38%, platelets 258K  12-lead ECG: rapid atrial fibrillation, rate 150s, low voltage QRS.  Echocardiogram: her study from 06/2023 at [facility] showed normal EF 60%, no significant valvular abnormalities.      IMPRESSION:  Atrial fibrillation with RVR. First sustained recurrence in months. Episode started ~4 am yesterday,  ongoing after ~30 hrs. Heart rate is fast at rest, BP is soft. She is hemodynamically stable. She has been on Xarelto without interruption, she takes every morning.  Suspected temporal arteritis. Steroids have been started. She will need a biopsy, tentatively scheduled for Monday. Her daughter, an RN, mentioned Dr. French mentioned earlier today that biopsy could be done on Xarelto, if need be.    RECOMMENDATIONS:    Stop diltiazem and start IV amiodarone.  If she remains in AF over the weekend, we can do one of two things: (a) continue Xarelto without interruption, cardiovert if needed and perform temporal artery biopsy on anticoagulation. (B) hold Xarelto temporarily, manage AF with rate control and pursue DEVON-cardioversion 1-2 days after biopsy, if AF persists.    Will provide Xarelto 20 mg one dose only and I will try to connect with Dr. French.    I appreciate the opportunity to be part of this patient's care.  Please feel free to call me with any questions.    High complexity medical decision-making.     Binta Castillo MD, [facility]        PHYSICAL EXAM:  Vitals: BP 91/71   Pulse (!) 141   Temp 98.9  F (37.2  C) (Oral)   Resp 18   Ht 1.524 m (5')   Wt 77.7 kg (171 lb 6.4 oz)   SpO2 96%   BMI 33.47 kg/m    No intake or output data in the 24 hours ending 08/24/24 1101  Vitals:    08/23/24 1806 08/24/24 0415 08/24/24 0544   Weight: 78.5 kg (173 lb) 77.7 kg (171 lb 6.4 oz) 77.7 kg (171 lb 6.4 oz)     Constitutional:  Alert and oriented x3.  Pt appears comfortable, has no CP or respiratory distress.  Head: Normocephalic and atraumatic.   Skin:  Normal color and texture.  No rashes, lesions or eruptions.  Eyes:  no jaundice, EOMI.  ENT:  Supple, normal JVP, no apparent thyroid enlargement.  Lymph:  No lymphadenopathy   Chest/Lungs:  Clear bilaterally, no rales or wheezing.  Cardiac: irregular rapid rhythm, normal S1 and S2.  No murmur, rub or gallop.    GI:  Normal bowel sounds. Abdomen is soft, non-tender & not  distended.  No rebound or guarding.    Extremities:  Radial pulses 2+ bilaterally.  DP and PT pulses palpable bilaterally.  No lower extremity edema is present.   Neurological:  CN 2-12 grossly intact.  Strength in UE is normal & symmetric.    Back:  No CVA tenderness.     REVIEW OF SYSTEMS:  A complete review of system was performed and was negative with the exception of what was described in the HPI section.     CURRENT MEDICATIONS:  Current Facility-Administered Medications   Medication Dose Route Frequency Provider Last Rate Last Admin    furosemide (LASIX) tablet 20 mg  20 mg Oral Daily Edis Franklin MD   20 mg at 08/24/24 0907    [Held by provider] losartan (COZAAR) tablet 50 mg  50 mg Oral Daily Edis Franklin MD        predniSONE (DELTASONE) tablet 60 mg  60 mg Oral Daily Edis Franklin MD   60 mg at 08/24/24 0908    [Held by provider] rivaroxaban ANTICOAGULANT (XARELTO) tablet 20 mg  20 mg Oral Daily with supper Edis Franklin MD        rosuvastatin (CRESTOR) tablet 20 mg  20 mg Oral QPM Edis Franklin MD   20 mg at 08/23/24 2133    traZODone (DESYREL) tablet 100 mg  100 mg Oral At Bedtime Edis Franklin MD   100 mg at 08/23/24 2134       ALLERGIES     Allergies   Allergen Reactions    Seasonal Allergies        PAST MEDICAL HISTORY:  Past Medical History:   Diagnosis Date    Arrhythmia     Pacemaker        PAST SURGICAL HISTORY:  Past Surgical History:   Procedure Laterality Date    INJECT EPIDURAL CAUDAL N/A 9/5/2023    Procedure: INJECTION, EPIDURAL, Caudal Racz Catheter;  Surgeon: Juan Pablo Broderick MD;  [place] [facility]    IR TRANSLAMINAR EPIDURAL LUMBAR INJ INCL IMAGING  12/26/2018       FAMILY HISTORY:  Family History   Problem Relation Age of Onset    Ovarian Cancer Sister        SOCIAL HISTORY:  Social History     Socioeconomic History    Marital status:    Tobacco Use    Smoking status: Never   Substance and Sexual Activity    Alcohol use: Yes      Comment: 2 drinks per week    Drug use: Not Currently     Social Determinants of Health     Financial Resource Strain: Low Risk  (8/23/2024)    Financial Resource Strain     Within the past 12 months, have you or your family members you live with been unable to get utilities (heat, electricity) when it was really needed?: No   Food Insecurity: Low Risk  (8/23/2024)    Food Insecurity     Within the past 12 months, did you worry that your food would run out before you got money to buy more?: No     Within the past 12 months, did the food you bought just not last and you didn t have money to get more?: No   Transportation Needs: Low Risk  (8/23/2024)    Transportation Needs     Within the past 12 months, has lack of transportation kept you from medical appointments, getting your medicines, non-medical meetings or appointments, work, or from getting things that you need?: No    Received from [facility]    Social Connections   Housing Stability: Low Risk  (8/23/2024)    Housing Stability     Do you have housing? : Yes     Are you worried about losing your housing?: No         Recent Lab Results:  Recent Labs   Lab 08/24/24  0608 08/23/24  1023   WBC 7.8 9.9   HGB 13.0 13.4   MCV 96 97    227   INR  --  3.37*   * 137   POTASSIUM 4.4 4.0   CHLORIDE 100 100   CO2 22 23   BUN 11.1 12.5   CR 0.55 0.65   ANIONGAP 12 14   HASMUKH 8.9 9.0   * 107*   ALBUMIN 3.4* 3.7   PROTTOTAL 6.5 6.8   BILITOTAL 0.3 0.4   ALKPHOS 136 136   ALT 95* 96*   AST 78* 96*   LIPASE  --  53         Clinically Significant Risk Factors Present on Admission              # Hypoalbuminemia: Lowest albumin = 3.4 g/dL at 8/24/2024  6:08 AM, will monitor as appropriate  # Drug Induced Coagulation Defect: home medication list includes an anticoagulant medication    # Hypertension: Home medication list includes antihypertensive(s)         # Obesity: Estimated body mass index is 33.47 kg/m  as calculated from the  following:    Height as of this encounter: 1.524 m (5').    Weight as of this encounter: 77.7 kg (171 lb 6.4 oz).        # Pacemaker present

## 2024-08-24 NOTE — PLAN OF CARE
Neuro- A&Ox4  Most Recent Vitals- Temp: 98.9  F (37.2  C) Temp src: Oral BP: 99/67 Pulse: (!) 139   Resp: 18 SpO2: 96 % O2 Device: None (Room air)   Tele/Cardiac- afib RVR  Resp- LS dim on RA  Activity- SBA  Pain- denies  Drips- dilt  Drains/Tubes- PIV  Skin- scattered bruising  GI/- voiding adequatelyper pt  Aggression Color- Green  COVID status- Negative  Plan- neuro, surgery and cards consults  Misc-     Marnie Eastman, RN Goal Outcome Evaluation:           Overall Patient Progress: no changeOverall Patient Progress: no change

## 2024-08-24 NOTE — PROVIDER NOTIFICATION
MD Notification    Notified Person: MD    Notified Person Name: Dr Tim Ferguson    Notification Date/Time: 08/24/2024, 0600    Notification Interaction: Talked with MD    Purpose of Notification: Dilt gtt turned off for systolic bp of 88. HR is in 120s. Do you want me to wait for days to take care of this?    Orders Received: Okay to wait for days     Comments:

## 2024-08-24 NOTE — H&P
[facility]    History and Physical  Hospitalist       Date of Admission:  8/23/2024    Assessment & Plan   Renate Tanner is a 73 year old female with PMH of atrial fibrillation s/p ablation and pacemaker, NICMO, HTN, HLD, BAMBI not on CPAP, age-related macular degeneration, who presents with headache and atrial fibrillation with RVR.    Upper respiratory illness  Patient reports onset of cough, chills, flu like symptoms four days ago, tested negative for COVID-19 at home. In the ED she had a negative respiratory viral panel, but COVID-19  PCR was not tested. Her CXR does not show any pneumonia. Her CRP is markedly elevated to 174, which would be higher than expected for her clinical COVID severity (not on oxygen, CXR relatively clear).  - Check COVID-19  - Special precautions for now  - Trend CRP x2 days    Severe left temporal headache  Rule out giant cell arteritis  Patient reports three days of a severe left temporal headache, she does not have headaches normally. She reports throbbing headache with some lightning type pain. It is associated with mild jaw claudication (eating seems to irritate the area). She denies vision changes however she has age-related macular degeneration. On exam she does not tenderness to palpation over her left temporal area, but she curiously has two small nodular lesions in the anterior temporal region. Finally she has a markedly elevated CRP of 174 and ESR is 39. While COVID (if diagnosed) can elevated CRP, the severity of patient's COVID could not explain a high CRP of 174. I think that the pre-test likelihood of giant cell arteritis is high enough to initiate low dose steroids tonight, and consider patient for temporal artery biopsy tomorrow.  - Start prednisone 60mg daily, first dose tonight  - Neurology consult  - Surgery consult  - Pain control with tylenol, gabapentin 300mg x1 dose, oxycodone PRN, dilaudid IV PRN    Symptomatic Atrial fibrillation with  RVR  History of ablation and pacemaker  Follows with [facility]. She has been previously on sotalol and prolonged amiodarone therapy, it looks like last given 9/2023. She has a pacemaker placed. Interrogation of pacemaker at ED reportedly showed onset of afib in AM of 8/23. HR 140s, SBP 120s. Did not respond to metoprolol IV in the ED. Respiratory illness of vasculitis above could both potentially be underlying patient's afib.   - Diltiazem drip tonight  - Cardiology consult  - Tele, I&O    Elevated LFTs: Both AST and ALT aare elevated to 90s. UNclear etiology, patietn denies abdominal pain, no clear CHF. This could be related to COVID if diagnosed, or some other etiology.  - Recheck and work up further pending trend    NICMO  HTN, HLD  Patient does not appear to be in CHF. Last echo 6/2023 with Ef 60%.  - Continue PTA lasix  - Continue PTA losartan    BAMBI not on CPAP. Noted.  - Continue PTA trazodone for sleep    Age related macular degeneration. Noted. PTA euyedrops    Clinically Significant Risk Factors Present on Admission               # Drug Induced Coagulation Defect: home medication list includes an anticoagulant medication    # Hypertension: Home medication list includes antihypertensive(s)         # Obesity: Estimated body mass index is 33.79 kg/m  as calculated from the following:    Height as of this encounter: 1.524 m (5').    Weight as of this encounter: 78.5 kg (173 lb).        # Pacemaker present              PT/OT: not yet  Diet: Cardiac, NPO at midnight  DVT Prophylaxis: Xarelto  Elena Catheter: Not present  Lines: None     Cardiac Monitoring: None  Code Status: Full  Disposition: Anticipated discharge 2-3 days    Edis Franklin MD  Hospitalist Service  [facility]  Securely message with [employer] (more info)  Text page via [employer] Paging/Directory     Primary Care Physician   Sky Samaniego    Chief Complaint   Headache, shortness of breath    History is obtained from  the patient  Daughter Omaira at bedside helped provide history and updated  Case discussed with ED provider    History of Present Illness   Renate Tanner is a 73 year old female who presents with shortness of breath and headache. She reports onset of shortness of breath, chills, cough, weakness starting four days ago. She has positive COVID exposure and tested for COVID at home more than once and it was negative. She continues to have SOB today, very mildly at rest, dyspneic with exertion. Denies chest pain, fevers. No change in taste or smell, but endorses decreased appetite. She feels exhausted today.    She reports onset of a severe left sided headache three days ago. She describes it as a temporal headache radiating to her jaw. She endorses jaw claudication. She has not had a headache like this for a very long time, she calls it severe. It's associated with nausea.    She is in atrial fibrllation. She checked for afib four days ago and it was negative. Daughter who is at bedside states that her pacemaker was interrogated today and it showed patient went into afib this morning.    Past Medical History    I have reviewed this patient's medical history and updated it with pertinent information if needed.   Past Medical History:   Diagnosis Date    Arrhythmia     Pacemaker        Past Surgical History   I have reviewed this patient's surgical history and updated it with pertinent information if needed.  Past Surgical History:   Procedure Laterality Date    INJECT EPIDURAL CAUDAL N/A 9/5/2023    Procedure: INJECTION, EPIDURAL, Caudal Racz Catheter;  Surgeon: Juan Pablo Broderick MD;  Location: [facility] OR    IR TRANSLAMINAR EPIDURAL LUMBAR INJ INCL IMAGING  12/26/2018       Prior to Admission Medications   Prior to Admission Medications   Prescriptions Last Dose Informant Patient Reported? Taking?   Biotin 5000 MCG TABS 8/23/2024  Yes Yes   Sig: Take 3 tablets by mouth daily.   Calcium Carbonate-Vit D-Min (CALCIUM 600+D  PLUS MINERALS PO) 8/23/2024  Yes Yes   Sig: Take 1 tablet by mouth daily   GLUCOSAMINE-CHONDROITIN PO 8/23/2024  Yes Yes   Sig: Take 1 tablet by mouth daily. 1500-1200mg   HEMP OIL OR EXTRACT OR OTHER CBD CANNABINOID, NOT MEDICAL CANNABIS, 8/22/2024  Yes Yes   Sig: Take 1 capsule by mouth every evening. CBD capsule   HYDROcodone-acetaminophen (NORCO) 5-325 MG tablet More than a month at prn  Yes Yes   Sig: Take 0.5-1 tablets by mouth daily as needed for breakthrough pain.   Multiple Vitamins-Minerals (PRESERVISION AREDS 2) CAPS 8/23/2024 at am x1  Yes Yes   Sig: Take 1 capsule by mouth 2 times daily.   alendronate (FOSAMAX) 70 MG tablet 8/20/2024  Yes Yes   Sig: Take 70 mg by mouth every 7 days.   ascorbic acid (VITAMIN C) 1000 MG TABS 8/23/2024  Yes Yes   Sig: Take 1,000 mg by mouth daily   carboxymethylcellulose PF (REFRESH PLUS) 0.5 % ophthalmic solution  at prn  Yes Yes   Sig: Place 1 drop into both eyes 4 times daily as needed for dry eyes.   fexofenadine (ALLEGRA ALLERGY) 180 MG tablet 8/23/2024  Yes Yes   Sig: Take 180 mg by mouth daily   fish oil-omega-3 fatty acids 1000 MG capsule 8/23/2024  Yes Yes   Sig: Take 1 g by mouth daily.   furosemide (LASIX) 20 MG tablet 8/23/2024  Yes Yes   Sig: Take 20 mg by mouth daily   losartan (COZAAR) 50 MG tablet 8/23/2024  Yes Yes   Sig: Take 50 mg by mouth daily   multivitamin, therapeutic (THERA-VIT) TABS tablet 8/23/2024  Yes Yes   Sig: Take 1 tablet by mouth daily.   rivaroxaban ANTICOAGULANT (XARELTO) 20 MG TABS tablet 8/23/2024 at am  Yes Yes   Sig: Take 20 mg by mouth daily.   rosuvastatin (CRESTOR) 20 MG tablet 8/22/2024  Yes Yes   Sig: Take 20 mg by mouth every evening.   simethicone (MYLICON) 125 MG chewable tablet 8/23/2024 at am x1  Yes Yes   Sig: Take 125 mg by mouth 2 times daily.   traZODone (DESYREL) 50 MG tablet 8/22/2024  Yes Yes   Sig: Take 100 mg by mouth at bedtime.      Facility-Administered Medications: None     Allergies   Allergies   Allergen  Reactions    Seasonal Allergies        Social History   I have reviewed this patient's social history and updated it with pertinent information if needed. Renate Tanner  reports that she has never smoked. She does not have any smokeless tobacco history on file. She reports current alcohol use. She reports that she does not currently use drugs.    Family History   I have reviewed this patient's family history and updated it with pertinent information if needed.   Family History   Problem Relation Age of Onset    Ovarian Cancer Sister        Review of Systems   The 10 point Review of Systems is negative other than noted in the HPI or here.    Physical Exam   Temp: 98.5  F (36.9  C) Temp src: Oral BP: 126/87 Pulse: 114     SpO2: 95 % O2 Device: None (Room air)    Vital Signs with Ranges  Temp:  [97.9  F (36.6  C)-98.5  F (36.9  C)] 98.5  F (36.9  C)  Pulse:  [] 114  Resp:  [16] 16  BP: (101-127)/(64-87) 126/87  SpO2:  [94 %-97 %] 95 %  173 lbs 0 oz    Constitutional: Female appears weak, tired  Eyes: PERRL, nonicteric  HEENT: Normocephalic. She has two nodular lesions over her left temporal area slightly anterior  Respiratory: CTAB, no wheezing or crackles  Cardiovascular: RRR, normal S1/2, no m/r/g  GI: Nontender  Vascular: Trace bilateral lower extremity pitting edema  Skin: See HEENT, she has two early nodular type lesions over her left temporal area  Musculoskeletal: Moves all extremities  Neurologic: A&Ox3  Psychiatric: Appropriate affect and mood    Data   Data reviewed today:  I personally reviewed labwork, CXR.  Recent Labs   Lab 08/23/24  1023   WBC 9.9   HGB 13.4   MCV 97      INR 3.37*      POTASSIUM 4.0   CHLORIDE 100   CO2 23   BUN 12.5   CR 0.65   ANIONGAP 14   HASMUKH 9.0   *   ALBUMIN 3.7   PROTTOTAL 6.8   BILITOTAL 0.4   ALKPHOS 136   ALT 96*   AST 96*   LIPASE 53       Imaging:  Recent Results (from the past 24 hour(s))   XR Chest 2 Views    Narrative    Examination: XR CHEST 2  VIEWS 8/23/2024 9:54 AM    Indication: jaw pain    Comparison: None    Findings:  PA and lateral view of the chest. Left chest wall dual-lead pacer  device. Pacer leads are grossly intact. Trachea is midline. Normal  cardiac silhouette. No significant pleural effusion. No pneumothorax.  No focal consolidation. No definite abnormal airspace opacities.  Partially visualized lumbar fusion hardware. No acute osseous  abnormality. Degenerative changes of the visualized spine.  Unremarkable visualized upper abdomen.      Impression    Impression: No acute cardiopulmonary process radiographically.    I have personally reviewed the examination and initial interpretation  and I agree with the findings.    ARNIE MCCORD MD         SYSTEM ID:  [id number]   CT Head w/o Contrast    Narrative    EXAM: CT HEAD W/O CONTRAST  8/23/2024 10:06 AM     PROVIDED HISTORY:  L head pain; Headache; Acute HA (< 3 months), no  complicating features       COMPARISON:  None available.    TECHNIQUE: Using multidetector thin collimation helical acquisition  technique, axial, coronal and sagittal CT images from the skull base  to the vertex were obtained without intravenous contrast.   (topogram) image(s) also obtained and reviewed.    FINDINGS:  No acute intracranial hemorrhage, mass effect, or midline shift. No  acute loss of gray-white matter differentiation in the cerebral  hemispheres. Ventricles are proportionate to the cerebral sulci and  normal for age. Clear basal cisterns.    The bony calvaria and the bones of the skull base are normal. The  visualized portions of the paranasal sinuses and mastoid air cells are  clear. Grossly normal orbits.       Impression    IMPRESSION: No acute intracranial pathology.     TAYA HAMPTON MD         SYSTEM ID:  [id number]   Cardiac Device Check - Remote   Result Value    Implantable Pulse Generator  [employer]    Implantable Pulse Generator Model W1DR01 [place] XT DR FAN    Implantable Pulse  Generator Serial Number [id number]    Type Interrogation Session Remote    Clinic Name [facility]    Implantable Pulse Generator Type Pacemaker    Implantable Pulse Generator Implant Date 20221031    Implantable Lead  Medtronic    Implantable Lead Model 3830 SelectSecure MRI SureScan    Implantable Lead Serial Number [id number]    Implantable Lead Implant Date 20221031    Implantable Lead Polarity Type Bipolar Lead    Implantable Lead Location Detail 1 UNKNOWN    Implantable Lead Location Right Atrium    Implantable Lead Connection Status Connected    Implantable Lead  Medtronic    Implantable Lead Model 3830 SelectSecure MRI SureScan    Implantable Lead Serial Number [id number]    Implantable Lead Implant Date 20221031    Implantable Lead Polarity Type Bipolar Lead    Implantable Lead Location Detail 1 UNKNOWN    Implantable Lead Location Right Ventricle    Implantable Lead Connection Status Connected    Date Time Interrogation Session [id number]    Narrative    Remote pacemaker transmission received and reviewed. Device transmission sent per MD orders.    Device: Medtronic [id number] [place] XT DR MRI  Normal Device Function  Mode: AAIR<>DDDR  bpm  AP: 24.5%  : <0.1%  Presenting EGM: AF w/ RVR.   Short V-V intervals: 0  Lead Trends Appear Stable.  Estimated battery longevity to RRT = 13.1 years. Battery voltage = 3.03 V.   Atrial Arrhythmia: AT/AF episode started 3:55 am, in progress.  AF Peculiar: 0.4%  Anticoagulant: Xarelto  Ventricular Arrhythmia: 6 NSVT episdoes lasting 1-2 sec @ 188-200 bpm.  Pt Notified of Transmission Results: ED called with results    SAJI Phillips RN    Remote pacemaker transmission    I have reviewed and interpreted the device interrogation, settings, programming and nurse's summary. The device is functioning within normal device parameters. I agree with the current findings, assessment and plan.

## 2024-08-24 NOTE — CONSULTS
Neurology Consult Note  [facility]       [2024]                                                                                       Admission Date: 2024  Hospital Day: 2      Patient: Renate Tanner      : 1950  MRN:  [id number]     CC:    No chief complaint on file.      Consult Request:  Referring Provider:  Edis Franklin MD  Primary Care Provider:  Sky Samaniego MD        HPI:  Renate Tanner is a 73 year old yo female admitted for new onset left temporal headache and shortness of breath.  She developed symptoms of shortness of breath, chills and generalized weakness on 2024.  Patient was worried about COVID due to COVID exposure.  Her COVID test was negative.  She also complains of severe intermittent sharp pains in the left V1-V3 territory.  She feels that her pain was intermittent in nature initially but she had continuous pain yesterday.  Patient had mild nausea with the headache.  She denies any visual auras or vision disturbances.  Her CRP level was significantly high- 172.  Patient denies any significant autonomic symptoms of redness in the eye or watering from the eyes.  She felt that there was increased watering from the right nostril.  Patient was also noted to have atrial fibrillation.          A complete review of symptoms was performed including vascular, infectious, cardiovascular, pulmonary, gastrointestinal, endocrinological, hematologic, dermatologic, musculoskeletal, and neurological. All were normal except as above.    PAST MEDICAL HISTORY:  ALLERGIES:   Allergies   Allergen Reactions    Seasonal Allergies      Tobacco:    History   Smoking Status    Never   Smokeless Tobacco    Not on file     Alcohol:  Social History    Substance and Sexual Activity      Alcohol use: Yes        Comment: 2 drinks per week    MEDICATIONS:       CURRENTLY SCHEDULED MEDICATIONS   Current Facility-Administered Medications   Medication  Dose Route Frequency Provider Last Rate Last Admin    amiodarone (NEXTERONE) bolus 150 mg  150 mg Intravenous Once Binta Castillo MD        furosemide (LASIX) tablet 20 mg  20 mg Oral Daily Edis Franklin MD   20 mg at 08/24/24 0907    [Held by provider] losartan (COZAAR) tablet 50 mg  50 mg Oral Daily Edis Franklin MD        predniSONE (DELTASONE) tablet 60 mg  60 mg Oral Daily Edis Franklin MD   60 mg at 08/24/24 0908    [Held by provider] rivaroxaban ANTICOAGULANT (XARELTO) tablet 20 mg  20 mg Oral Daily with supper Edis Franklin MD        rosuvastatin (CRESTOR) tablet 20 mg  20 mg Oral QPM Edis Franklin MD   20 mg at 08/23/24 2133    traZODone (DESYREL) tablet 100 mg  100 mg Oral At Bedtime Edis Franklin MD   100 mg at 08/23/24 2134          HOME MEDICATIONS  Facility-Administered Medications Prior to Admission   Medication Dose Route Frequency Provider Last Rate Last Admin    [DISCONTINUED] lidocaine (PF) 0.5 % injection SOLN 8 mL  8 mL   Felipe Delgado MD   8 mL at 10/09/23 1022    [DISCONTINUED] triamcinolone (KENALOG-40) injection 40 mg  40 mg   Felipe Delgado MD   40 mg at 10/09/23 1022     Medications Prior to Admission   Medication Sig Dispense Refill Last Dose    alendronate (FOSAMAX) 70 MG tablet Take 70 mg by mouth every 7 days.   8/20/2024    ascorbic acid (VITAMIN C) 1000 MG TABS Take 1,000 mg by mouth daily   8/23/2024    Biotin 5000 MCG TABS Take 3 tablets by mouth daily.   8/23/2024    Calcium Carbonate-Vit D-Min (CALCIUM 600+D PLUS MINERALS PO) Take 1 tablet by mouth daily   8/23/2024    carboxymethylcellulose PF (REFRESH PLUS) 0.5 % ophthalmic solution Place 1 drop into both eyes 4 times daily as needed for dry eyes.    at prn    fexofenadine (ALLEGRA ALLERGY) 180 MG tablet Take 180 mg by mouth daily   8/23/2024    fish oil-omega-3 fatty acids 1000 MG capsule Take 1 g by mouth daily.   8/23/2024     furosemide (LASIX) 20 MG tablet Take 20 mg by mouth daily   8/23/2024    GLUCOSAMINE-CHONDROITIN PO Take 1 tablet by mouth daily. 1500-1200mg   8/23/2024    HEMP OIL OR EXTRACT OR OTHER CBD CANNABINOID, NOT MEDICAL CANNABIS, Take 1 capsule by mouth every evening. CBD capsule   8/22/2024    HYDROcodone-acetaminophen (NORCO) 5-325 MG tablet Take 0.5-1 tablets by mouth daily as needed for breakthrough pain.   More than a month at prn    losartan (COZAAR) 50 MG tablet Take 50 mg by mouth daily   8/23/2024    Multiple Vitamins-Minerals (PRESERVISION AREDS 2) CAPS Take 1 capsule by mouth 2 times daily.   8/23/2024 at am x1    multivitamin, therapeutic (THERA-VIT) TABS tablet Take 1 tablet by mouth daily.   8/23/2024    rivaroxaban ANTICOAGULANT (XARELTO) 20 MG TABS tablet Take 20 mg by mouth daily.   8/23/2024 at am    rosuvastatin (CRESTOR) 20 MG tablet Take 20 mg by mouth every evening.   8/22/2024    simethicone (MYLICON) 125 MG chewable tablet Take 125 mg by mouth 2 times daily.   8/23/2024 at am x1    traZODone (DESYREL) 50 MG tablet Take 100 mg by mouth at bedtime.   8/22/2024     MEDICAL HISTORY  Past Medical History:   Diagnosis Date    Arrhythmia     Pacemaker      SURGICAL HISTORY  Past Surgical History:   Procedure Laterality Date    INJECT EPIDURAL CAUDAL N/A 9/5/2023    Procedure: INJECTION, EPIDURAL, Caudal Racz Catheter;  Surgeon: Juan Pablo Broderick MD;  Location: [facility] OR    IR TRANSLAMINAR EPIDURAL LUMBAR INJ INCL IMAGING  12/26/2018     FAMILY HISTORY    Family History   Problem Relation Age of Onset    Ovarian Cancer Sister      SOCIAL HISTORY  Social History     Socioeconomic History    Marital status:    Tobacco Use    Smoking status: Never   Substance and Sexual Activity    Alcohol use: Yes     Comment: 2 drinks per week    Drug use: Not Currently     Social Determinants of Health     Financial Resource Strain: Low Risk  (8/23/2024)    Financial Resource Strain     Within the past 12 months,  have you or your family members you live with been unable to get utilities (heat, electricity) when it was really needed?: No   Food Insecurity: Low Risk  (2024)    Food Insecurity     Within the past 12 months, did you worry that your food would run out before you got money to buy more?: No     Within the past 12 months, did the food you bought just not last and you didn t have money to get more?: No   Transportation Needs: Low Risk  (2024)    Transportation Needs     Within the past 12 months, has lack of transportation kept you from medical appointments, getting your medicines, non-medical meetings or appointments, work, or from getting things that you need?: No    Received from [facility] & [facility]    Social Connections   Housing Stability: Low Risk  (2024)    Housing Stability     Do you have housing? : Yes     Are you worried about losing your housing?: No            Height: 152.4 cm (5')     Temp: 98.9  F (37.2  C)   Weight: 77.7 kg (171 lb 6.4 oz)    Temp src: Oral         BP: (!) 112/95         Estimated body mass index is 33.47 kg/m  as calculated from the following:    Height as of this encounter: 1.524 m (5').    Weight as of this encounter: 77.7 kg (171 lb 6.4 oz).    Resp: 18   SpO2: 96 %   O2 Device: None (Room air)     Blood Pressure:   BP Readings from Last 3 Encounters:   24 (!) 112/95   24 101/66   23 110/73     T24 : Temp (24hrs), Av.7  F (37.1  C), Min:98.5  F (36.9  C), Max:98.9  F (37.2  C)       GENERAL EXAMINATION:  HEENT: PERRLA, EOMI. there is no tenderness over the temporal arteries bilaterally.  Neck: Supple, No myofascial tender points.    NEUROLOGICAL EXAMINATION  Mental Status:  Patient is awake, alert, and oriented X3. Speech and language functions are normal. Short- and long-term memory are intact.      Cranial Nerves: Pupils are equal and reactive to light.Visual fields are full to confrontation. Extraocular movements are  intact. There is no nystagmus in the horizontal or vertical planes.No facial sensory deficits. No facial weakness. The tongue is midline.     Motor: Muscle tone is normal. There is no pronator drift. There is no muscle atrophy. The strength is 5/5 in upper and lower extremities bilaterally. Deep tendon reflexes are 2+ and symmetric in biceps, triceps, knees, and ankles. Plantars are flexor. .     Coordination: .Finger to nose - normal.      .  Review of Diagnostics:     I personally reviewed and interpreted the following:    Cardiac Device Check - Remote    Result Date: 8/23/2024  Remote pacemaker transmission received and reviewed. Device transmission sent per MD orders. Device: Medtronic [id number] [place] XT DR MRI Normal Device Function Mode: AAIR<>DDDR  bpm AP: 24.5% : <0.1% Presenting EGM: AF w/ RVR. Short V-V intervals: 0 Lead Trends Appear Stable. Estimated battery longevity to RRT = 13.1 years. Battery voltage = 3.03 V. Atrial Arrhythmia: AT/AF episode started 3:55 am, in progress. AF [place]: 0.4% Anticoagulant: Xarelto Ventricular Arrhythmia: 6 NSVT episdoes lasting 1-2 sec @ 188-200 bpm. Pt Notified of Transmission Results: ED called with results SAJI Phillips RN Remote pacemaker transmission I have reviewed and interpreted the device interrogation, settings, programming and nurse's summary. The device is functioning within normal device parameters. I agree with the current findings, assessment and plan.    XR Chest 2 Views    Result Date: 8/23/2024  Examination: XR CHEST 2 VIEWS 8/23/2024 9:54 AM Indication: jaw pain Comparison: None Findings: PA and lateral view of the chest. Left chest wall dual-lead pacer device. Pacer leads are grossly intact. Trachea is midline. Normal cardiac silhouette. No significant pleural effusion. No pneumothorax. No focal consolidation. No definite abnormal airspace opacities. Partially visualized lumbar fusion hardware. No acute osseous abnormality. Degenerative changes  "of the visualized spine. Unremarkable visualized upper abdomen.     Impression: No acute cardiopulmonary process radiographically. I have personally reviewed the examination and initial interpretation and I agree with the findings. ARNIE MCCORD MD   SYSTEM ID:  [id number]    CT Head w/o Contrast    Result Date: 8/23/2024  EXAM: CT HEAD W/O CONTRAST  8/23/2024 10:06 AM PROVIDED HISTORY:  L head pain; Headache; Acute HA (< 3 months), no complicating features   COMPARISON:  None available. TECHNIQUE: Using multidetector thin collimation helical acquisition technique, axial, coronal and sagittal CT images from the skull base to the vertex were obtained without intravenous contrast.  (topogram) image(s) also obtained and reviewed. FINDINGS: No acute intracranial hemorrhage, mass effect, or midline shift. No acute loss of gray-white matter differentiation in the cerebral hemispheres. Ventricles are proportionate to the cerebral sulci and normal for age. Clear basal cisterns. The bony calvaria and the bones of the skull base are normal. The visualized portions of the paranasal sinuses and mastoid air cells are clear. Grossly normal orbits.     IMPRESSION: No acute intracranial pathology. TAYA HAMPTON MD   SYSTEM ID:  [id number]        Vitamin B12: No results found for: \"B12\"      Complete Blood Count:    Recent Labs   Lab Test 08/24/24  0608 08/23/24  1023   WBC 7.8 9.9   RBC 4.00 4.02   HGB 13.0 13.4   HCT 38.3 38.9    227   LYMPH  --  13        HgA1c: No results found for: \"A1C\"     Thyroid Stimulating Hormone:   Lab Results   Component Value Date    TSH 2.55 08/23/2024        Recent Labs   Lab Test 08/24/24  0608 08/23/24  1023   WBC 7.8 9.9   HGB 13.0 13.4   MCV 96 97    227   INR  --  3.37*      Recent Labs   Lab Test 08/24/24  0608 08/23/24  1023   * 137   POTASSIUM 4.4 4.0   CHLORIDE 100 100   CO2 22 23   BUN 11.1 12.5   CR 0.55 0.65   ANIONGAP 12 14   HASMUKH 8.9 9.0   * 107* "     CMP:  Recent Labs   Lab 08/24/24  0608 08/23/24  1023   PROTTOTAL 6.5 6.8   ALBUMIN 3.4* 3.7   ALKPHOS 136 136   AST 78* 96*   ALT 95* 96*   BILITOTAL 0.3 0.4   LIPASE  --  53       _____________________________________________________________________________  _____________________________________________________________________________          ASSESSMENT     New onset left temporal headache with intermittent sharp stabbing pains: Patient has significant clinical suspicion of temporal arteritis with new onset headache, age above 50 and elevated CRP level.  The differential diagnosis may include trigeminal autonomic cephalalgia.        RECOMMENDATIONS   Patient is scheduled for temporal artery biopsy in the next few days.  Continue prednisone 60 mg on daily basis.  Follow-up in clinic in 3 to 4 weeks.        Jamari Guan MD    [facility]

## 2024-08-25 ENCOUNTER — ANESTHESIA EVENT (OUTPATIENT)
Dept: SURGERY | Facility: CLINIC | Age: 74
DRG: 253 | End: 2024-08-25
Payer: COMMERCIAL

## 2024-08-25 ENCOUNTER — ANESTHESIA (OUTPATIENT)
Dept: SURGERY | Facility: CLINIC | Age: 74
DRG: 253 | End: 2024-08-25
Payer: COMMERCIAL

## 2024-08-25 LAB
ALBUMIN SERPL BCG-MCNC: 3.2 G/DL (ref 3.5–5.2)
ALP SERPL-CCNC: 126 U/L (ref 40–150)
ALT SERPL W P-5'-P-CCNC: 99 U/L (ref 0–50)
ANION GAP SERPL CALCULATED.3IONS-SCNC: 15 MMOL/L (ref 7–15)
AST SERPL W P-5'-P-CCNC: 74 U/L (ref 0–45)
BILIRUB DIRECT SERPL-MCNC: <0.2 MG/DL (ref 0–0.3)
BILIRUB SERPL-MCNC: 0.2 MG/DL
BUN SERPL-MCNC: 17.3 MG/DL (ref 8–23)
CALCIUM SERPL-MCNC: 8.9 MG/DL (ref 8.8–10.4)
CHLORIDE SERPL-SCNC: 97 MMOL/L (ref 98–107)
CREAT SERPL-MCNC: 0.63 MG/DL (ref 0.51–0.95)
CRP SERPL-MCNC: 76.22 MG/L
EGFRCR SERPLBLD CKD-EPI 2021: >90 ML/MIN/1.73M2
GLUCOSE SERPL-MCNC: 114 MG/DL (ref 70–99)
HCO3 SERPL-SCNC: 21 MMOL/L (ref 22–29)
INR PPP: 3.43 (ref 0.85–1.15)
POTASSIUM SERPL-SCNC: 4 MMOL/L (ref 3.4–5.3)
PROT SERPL-MCNC: 6.2 G/DL (ref 6.4–8.3)
SODIUM SERPL-SCNC: 133 MMOL/L (ref 135–145)

## 2024-08-25 PROCEDURE — 250N000013 HC RX MED GY IP 250 OP 250 PS 637: Performed by: INTERNAL MEDICINE

## 2024-08-25 PROCEDURE — 36415 COLL VENOUS BLD VENIPUNCTURE: CPT | Performed by: INTERNAL MEDICINE

## 2024-08-25 PROCEDURE — 92960 CARDIOVERSION ELECTRIC EXT: CPT | Performed by: NURSE ANESTHETIST, CERTIFIED REGISTERED

## 2024-08-25 PROCEDURE — 210N000002 HC R&B HEART CARE

## 2024-08-25 PROCEDURE — 85610 PROTHROMBIN TIME: CPT | Performed by: INTERNAL MEDICINE

## 2024-08-25 PROCEDURE — 250N000011 HC RX IP 250 OP 636: Performed by: NURSE ANESTHETIST, CERTIFIED REGISTERED

## 2024-08-25 PROCEDURE — 99100 ANES PT EXTEME AGE<1 YR&>70: CPT | Performed by: NURSE ANESTHETIST, CERTIFIED REGISTERED

## 2024-08-25 PROCEDURE — 80053 COMPREHEN METABOLIC PANEL: CPT | Performed by: INTERNAL MEDICINE

## 2024-08-25 PROCEDURE — 999N000010 HC STATISTIC ANES STAT CODE-CRNA PER MINUTE

## 2024-08-25 PROCEDURE — 5A2204Z RESTORATION OF CARDIAC RHYTHM, SINGLE: ICD-10-PCS | Performed by: INTERNAL MEDICINE

## 2024-08-25 PROCEDURE — 250N000012 HC RX MED GY IP 250 OP 636 PS 637: Performed by: INTERNAL MEDICINE

## 2024-08-25 PROCEDURE — 86140 C-REACTIVE PROTEIN: CPT | Performed by: INTERNAL MEDICINE

## 2024-08-25 PROCEDURE — 99233 SBSQ HOSP IP/OBS HIGH 50: CPT | Performed by: INTERNAL MEDICINE

## 2024-08-25 PROCEDURE — 92960 CARDIOVERSION ELECTRIC EXT: CPT | Performed by: ANESTHESIOLOGY

## 2024-08-25 PROCEDURE — 258N000003 HC RX IP 258 OP 636: Performed by: NURSE ANESTHETIST, CERTIFIED REGISTERED

## 2024-08-25 PROCEDURE — 99232 SBSQ HOSP IP/OBS MODERATE 35: CPT | Performed by: INTERNAL MEDICINE

## 2024-08-25 PROCEDURE — 370N000017 HC ANESTHESIA TECHNICAL FEE, PER MIN

## 2024-08-25 RX ORDER — SODIUM CHLORIDE 9 MG/ML
INJECTION, SOLUTION INTRAVENOUS CONTINUOUS PRN
Status: DISCONTINUED | OUTPATIENT
Start: 2024-08-25 | End: 2024-08-25

## 2024-08-25 RX ORDER — MAGNESIUM SULFATE HEPTAHYDRATE 40 MG/ML
2 INJECTION, SOLUTION INTRAVENOUS
Status: DISCONTINUED | OUTPATIENT
Start: 2024-08-25 | End: 2024-08-26 | Stop reason: HOSPADM

## 2024-08-25 RX ORDER — POTASSIUM CHLORIDE 1500 MG/1
40 TABLET, EXTENDED RELEASE ORAL
Status: DISCONTINUED | OUTPATIENT
Start: 2024-08-25 | End: 2024-08-25

## 2024-08-25 RX ORDER — POTASSIUM CHLORIDE 1500 MG/1
20 TABLET, EXTENDED RELEASE ORAL
Status: DISCONTINUED | OUTPATIENT
Start: 2024-08-25 | End: 2024-08-26 | Stop reason: HOSPADM

## 2024-08-25 RX ORDER — PROPOFOL 10 MG/ML
INJECTION, EMULSION INTRAVENOUS PRN
Status: DISCONTINUED | OUTPATIENT
Start: 2024-08-25 | End: 2024-08-25

## 2024-08-25 RX ADMIN — PREDNISONE 60 MG: 20 TABLET ORAL at 08:29

## 2024-08-25 RX ADMIN — TRAZODONE HYDROCHLORIDE 100 MG: 100 TABLET ORAL at 22:17

## 2024-08-25 RX ADMIN — PROPOFOL 60 MG: 10 INJECTION, EMULSION INTRAVENOUS at 09:07

## 2024-08-25 RX ADMIN — SODIUM CHLORIDE: 9 INJECTION, SOLUTION INTRAVENOUS at 09:03

## 2024-08-25 RX ADMIN — FUROSEMIDE 20 MG: 20 TABLET ORAL at 11:05

## 2024-08-25 RX ADMIN — ROSUVASTATIN CALCIUM 20 MG: 20 TABLET, FILM COATED ORAL at 22:17

## 2024-08-25 RX ADMIN — RIVAROXABAN 20 MG: 20 TABLET, FILM COATED ORAL at 08:29

## 2024-08-25 ASSESSMENT — ACTIVITIES OF DAILY LIVING (ADL)
ADLS_ACUITY_SCORE: 21

## 2024-08-25 ASSESSMENT — ENCOUNTER SYMPTOMS
SEIZURES: 0
DYSRHYTHMIAS: 1

## 2024-08-25 ASSESSMENT — LIFESTYLE VARIABLES: TOBACCO_USE: 0

## 2024-08-25 NOTE — PLAN OF CARE
Goal Outcome Evaluation:      Plan of Care Reviewed With: patient      Patient is up in the room independent, blood pressure is slow getting higher. Patient had cardioversion this morning, SR to ST with activity. Continue to monitor, RA, Biopsy tomorrow afternoon around 4 p.m. per Dr. French. Six hours NPO before procedure. Amiodarone drip was stopped at 5 p.m.. Continue to monitor.

## 2024-08-25 NOTE — ANESTHESIA PREPROCEDURE EVALUATION
Anesthesia Pre-Procedure Evaluation    Patient: Renate Tanner   MRN: [id number] : 1950        Procedure : Procedure(s):  Anesthesia cardioversion          Past Medical History:   Diagnosis Date    Arrhythmia     Pacemaker       Past Surgical History:   Procedure Laterality Date    INJECT EPIDURAL CAUDAL N/A 2023    Procedure: INJECTION, EPIDURAL, Caudal Racz Catheter;  Surgeon: Juan Pablo Broderick MD;  Location: [facility] OR    IR TRANSLAMINAR EPIDURAL LUMBAR INJ INCL IMAGING  2018      Allergies   Allergen Reactions    Seasonal Allergies       Social History     Tobacco Use    Smoking status: Never    Smokeless tobacco: Not on file   Substance Use Topics    Alcohol use: Yes     Comment: 2 drinks per week      Wt Readings from Last 1 Encounters:   24 79.6 kg (175 lb 6.4 oz)        Anesthesia Evaluation   Pt has had prior anesthetic.         ROS/MED HX  ENT/Pulmonary:     (+) sleep apnea, mild,                                    (-) tobacco use and asthma   Neurologic: Comment: Temporal arteritis   (-) no seizures and migraines   Cardiovascular: Comment: Afib with rvr    (+)  - -   -  - -              pacemaker,          dysrhythmias, Sick Sinus Syndrome and a-fib,             METS/Exercise Tolerance:     Hematologic:       Musculoskeletal:       GI/Hepatic:    (-) GERD   Renal/Genitourinary:       Endo:    (-) Type II DM and thyroid disease   Psychiatric/Substance Use:       Infectious Disease:       Malignancy:       Other:            Physical Exam    Airway        Mallampati: II   TM distance: > 3 FB   Neck ROM: full   Mouth opening: > 3 cm    Respiratory Devices and Support         Dental       (+) Modest Abnormalities - crowns, retainers, 1 or 2 missing teeth      Cardiovascular   cardiovascular exam normal          Pulmonary   pulmonary exam normal                OUTSIDE LABS:  CBC:   Lab Results   Component Value Date    WBC 7.8 2024    WBC 9.9 2024    HGB 13.0 2024    HGB  "13.4 08/23/2024    HCT 38.3 08/24/2024    HCT 38.9 08/23/2024     08/24/2024     08/23/2024     BMP:   Lab Results   Component Value Date     (L) 08/25/2024     (L) 08/24/2024    POTASSIUM 4.0 08/25/2024    POTASSIUM 4.4 08/24/2024    CHLORIDE 97 (L) 08/25/2024    CHLORIDE 100 08/24/2024    CO2 21 (L) 08/25/2024    CO2 22 08/24/2024    BUN 17.3 08/25/2024    BUN 11.1 08/24/2024    CR 0.63 08/25/2024    CR 0.55 08/24/2024     (H) 08/25/2024     (H) 08/24/2024     COAGS:   Lab Results   Component Value Date    PTT 66 (H) 08/23/2024    INR 3.37 (H) 08/23/2024     POC: No results found for: \"BGM\", \"HCG\", \"HCGS\"  HEPATIC:   Lab Results   Component Value Date    ALBUMIN 3.2 (L) 08/25/2024    PROTTOTAL 6.2 (L) 08/25/2024    ALT 99 (H) 08/25/2024    AST 74 (H) 08/25/2024    ALKPHOS 126 08/25/2024    BILITOTAL 0.2 08/25/2024     OTHER:   Lab Results   Component Value Date    HASMUKH 8.9 08/25/2024    LIPASE 53 08/23/2024    TSH 2.55 08/23/2024    SED 39 (H) 08/23/2024       Anesthesia Plan    ASA Status:  3       Anesthesia Type: General.     - Airway: Mask Only   Induction: Propofol.           Consents    Anesthesia Plan(s) and associated risks, benefits, and realistic alternatives discussed. Questions answered and patient/representative(s) expressed understanding.     - Discussed:     - Discussed with:  Patient            Postoperative Care    Pain management: IV analgesics.        Comments:               Alaina Dennis I have reviewed the pertinent notes and labs in the chart from the past 30 days and (re)examined the patient.  Any updates or changes from those notes are reflected in this note.     # Hyponatremia: Lowest Na = 133 mmol/L in last 30 days, will monitor as appropriate      # Hypoalbuminemia: Lowest albumin = 3.2 g/dL at 8/25/2024  6:07 AM, will monitor as appropriate   # Drug Induced Coagulation Defect: home medication list includes an anticoagulant medication   # Obesity: " Estimated body mass index is 34.26 kg/m  as calculated from the following:    Height as of this encounter: 1.524 m (5').    Weight as of this encounter: 79.6 kg (175 lb 6.4 oz).

## 2024-08-25 NOTE — ANESTHESIA CARE TRANSFER NOTE
Patient: Renate Tanner    Procedure: Procedure(s):  Anesthesia cardioversion       Diagnosis: Atrial fibrillation, unspecified type (H) [I48.91]  Diagnosis Additional Information: No value filed.    Anesthesia Type:   No value filed.     Note:    Oropharynx: oropharynx clear of all foreign objects and spontaneously breathing  Level of Consciousness: awake  Oxygen Supplementation: face mask  Level of Supplemental Oxygen (L/min / FiO2): 10  Independent Airway: airway patency satisfactory and stable  Dentition: dentition unchanged  Vital Signs Stable: post-procedure vital signs reviewed and stable  Report to RN Given: handoff report given  Patient transferred to: Phase II    Handoff Report: Identifed the Patient, Identified the Reponsible Provider, Reviewed the pertinent medical history, Discussed the surgical course, Reviewed Intra-OP anesthesia mangement and issues during anesthesia, Set expectations for post-procedure period and Allowed opportunity for questions and acknowledgement of understanding      Vitals:  Vitals Value Taken Time   /66 08/25/24 0912   Temp     Pulse 65 08/25/24 0912   Resp     SpO2 99 % 08/25/24 0914   Vitals shown include unfiled device data.    Electronically Signed By: RENATA Barros CRNA  August 25, 2024  9:15 AM

## 2024-08-25 NOTE — PLAN OF CARE
A&OX4, RA, BP stable, Tele- Afib RVR -120s, asymptomatic, on amiodarone gtt. Up with SBA,   Plan: NPO since midnight for possible cardioversion

## 2024-08-25 NOTE — PROGRESS NOTES
She remains in sinus rhythm.    Following discussion with the patient and her daughter, she would like to establish EP care near her home; she lives in [place] which is near our [facility] office.    I have requested follow-up with Dr. Burgos in 2-3 months.    Continue current medications, including Xarelto.    EP signing off.

## 2024-08-25 NOTE — PROGRESS NOTES
[facility]    Medicine Progress Note - Hospitalist Service    Date of Admission:  8/23/2024    Assessment & Plan   Renate Tanner is a 73 year old female with PMH of atrial fibrillation s/p ablation and pacemaker, NICMO, HTN, HLD, BAMBI not on CPAP, age-related macular degeneration, who presents with headache and atrial fibrillation with RVR.    Symptomatic Atrial fibrillation with RVR  History of ablation and pacemaker  Elevated troponins, likely demand ischemia  Follows with [facility]. She has been previously on sotalol and prolonged amiodarone therapy, it looks like last given 9/2023. She has a pacemaker placed. Interrogation of pacemaker at ED reportedly showed onset of afib in AM of 8/23. HR 140s, SBP 120s. Did not respond to metoprolol IV in the ED. Viral respiratory and possible giant cell arteritis as below could both potentially be underlying patient's afib.   - TSH 2.55  - Started on diltiazem drip on admission  - 8/24-Telemetry- heart rate still in 120s in a.m., BP on lower side  - Serial troponins 27--21-nonischemic pattern  - Cardiology consult appreciated  - Cardizem drip discontinued and started on amiodarone infusion  - 8/25- still in Afib with HR in 130-150s this am  - plan for cardioversion this am  - continue PTA Xarelto    Upper respiratory illness  Patient reports onset of cough, chills, flu like symptoms four days ago, tested negative for COVID-19 at home. In the ED she had a negative respiratory viral panel, but COVID-19  PCR was not tested. Her CXR does not show any pneumonia. Her CRP is markedly elevated to 174, which would be higher than expected for her clinical COVID severity (not on oxygen, CXR relatively clear).  - COVID-19 neg  -  CRP trending down 172--136--76.2  - Supportive care    Severe left temporal headache, improved  Rule out giant cell arteritis  Patient reports three days of a severe left temporal headache, she does not have headaches normally.  She reports throbbing headache with some lightning type pain. It is associated with mild jaw claudication (eating seems to irritate the area). She denies vision changes however she has age-related macular degeneration. On exam she does not tenderness to palpation over her left temporal area, but she curiously has two small nodular lesions in the anterior temporal region. Finally she has a markedly elevated CRP of 174 and ESR is 39. While COVID (if diagnosed) can elevated CRP, the severity of patient's COVID could not explain a high CRP of 174. I think that the pre-test likelihood of giant cell arteritis is high enough to initiate low dose steroids tonight, and consider patient for temporal artery biopsy  - CT head negative  - Started prednisone 60mg daily  - Neurology consult-appreciated  - Vascular surgery consult-plan for temporal artery biopsy on Monday?  - Vasc Surgery OK to continue with Xarelto  - Other differential diagnosis includes trigeminal neuralgia  - CRP trending down 163.3-->76  - Pain control with tylenol, gabapentin 300mg x1 dose, oxycodone PRN, dilaudid IV PRN  - denies headache today  - Follow-up with neurology clinic in 3 to 4 weeks    Elevated LFTs: Both AST and ALT aare elevated to 90s. UNclear etiology, patietn denies abdominal pain, no clear CHF. This could be related to COVID if diagnosed, or some other etiology.  - ALT 96--95--99, AST 96--78--74  - US RUQ  - LFTs in am    NICM  HTN, HLD  [PTA on losartan 50 mg p.o. daily and Lasix 20 mg p.o. daily]  Patient does not appear to be in CHF. Last echo 6/2023 with Ef 60%.  - Continue PTA lasix  - PTA losartan on hold given soft BPs while on Amiodarone drip    BAMBI not on CPAP. Noted.  - Continue PTA trazodone for sleep    Age related macular degeneration. Noted. PTA euyedrops          Diet: Low Saturated Fat Na <2400 mg    DVT Prophylaxis: DOAC  Elena Catheter: Not present  Lines: None     Cardiac Monitoring: None  Code Status: Full Code       Clinically Significant Risk Factors Present on Admission              # Hypoalbuminemia: Lowest albumin = 3.4 g/dL at 8/24/2024  6:08 AM, will monitor as appropriate    # Drug Induced Coagulation Defect: home medication list includes an anticoagulant medication    # Hypertension: Home medication list includes antihypertensive(s)         # Obesity: Estimated body mass index is 33.47 kg/m  as calculated from the following:    Height as of this encounter: 1.524 m (5').    Weight as of this encounter: 77.7 kg (171 lb 6.4 oz).        # Pacemaker present         Disposition Plan     Medically Ready for Discharge: Anticipated in 2-4 Days; anticipate 8/27           Lety Estes MD  Hospitalist Service  [facility]  Securely message with [employer] (more info)  Text page via [employer] Paging/Directory   ______________________________________________________________________    Interval History   This am- she is in Afib with RVR HR in 150's  Reports some SOB and palpitations, no chest pain  Reports some mildly productive cough  No headache  No N/V, no abd pain  Plan for cardioversion this am  Discussed with her daughter with RN at bedside;she asked for a nutrition consult  Discussed with bedside RN    Physical Exam   Vital Signs: Temp: 97.6  F (36.4  C) Temp src: Oral BP: 106/82 Pulse: 119   Resp: 16 SpO2: 97 % O2 Device: None (Room air)    Weight: 171 lbs 6.4 oz    General Appearance: Awake/alert, looks flushed  Respiratory: Bilateral air entry, no wheezing, no rales, no crackles  Cardiovascular: Irregularly irregular, tachycardia, no murmurs  GI: Abdomen is soft, nontender, bowel sounds are present  Skin: No rashes, no cyanosis  Neuro: AAOx3, no focal neurological deficits    Medical Decision Making       Medical Decision Making       55 MINUTES SPENT BY ME on the date of service doing chart review, history, exam, documentation & further activities per the note.  MANAGEMENT DISCUSSED with the  following over the past 24 hours: the patient, daughter, RN   NOTE(S)/MEDICAL RECORDS REVIEWED over the past 24 hours: Cardiology notes  Tests ORDERED & REVIEWED in the past 24 hours:  - BMP  - LFTs       Data     I have personally reviewed the following data over the past 24 hrs:    N/A  \   N/A   / N/A     N/A N/A N/A /  N/A   N/A N/A N/A \     ALT: N/A AST: N/A AP: N/A TBILI: N/A   ALB: N/A TOT PROTEIN: N/A LIPASE: N/A     Procal: N/A CRP: N/A Lactic Acid: N/A         Imaging results reviewed over the past 24 hrs:   No results found for this or any previous visit (from the past 24 hour(s)).

## 2024-08-25 NOTE — ANESTHESIA POSTPROCEDURE EVALUATION
Patient: Renate Tanner    Procedure: Procedure(s):  Anesthesia cardioversion       Anesthesia Type:  General    Note:  Disposition: Inpatient   Postop Pain Control: Uneventful            Sign Out: Well controlled pain   PONV: No   Neuro/Psych: Uneventful            Sign Out: Acceptable/Baseline neuro status   Airway/Respiratory: Uneventful            Sign Out: Acceptable/Baseline resp. status   CV/Hemodynamics: Uneventful            Sign Out: Acceptable CV status; No obvious hypovolemia; No obvious fluid overload   Other NRE: NONE   DID A NON-ROUTINE EVENT OCCUR?            Last vitals:  Vitals:    08/25/24 0930 08/25/24 1000 08/25/24 1030   BP: 90/66 108/69 118/73   Pulse: 69 73 74   Resp:      Temp:      SpO2: 96%  96%       Electronically Signed By: Alaina Dennis  August 25, 2024  10:43 AM

## 2024-08-25 NOTE — CONSULTS
"RD consulted per provider order \"cardiac diet education\"   - Unable to complete consult today. Noted discharge planned soonest 8/27. RD will plan to see for education prior to discharge.     "

## 2024-08-25 NOTE — PROGRESS NOTES
Cardioversion    Indication: atrial fibrillation with RVR.  Anticoagulation: rivaroxaban  Anesthesia: propofol 60 mg administered by members of the anesthesia team.    Once the patient was deeply asleep, a single 150 J synchronized biphasic shock was delivered via AP patches. Sinus rhythm with PACs was restored.    The patient awoke from the anesthetic without issues.    We will stop IV amiodarone in a few hours.

## 2024-08-25 NOTE — PROGRESS NOTES
EP CARDIOLOGY PROGRESS NOTE  Darwin Castillo MD (pager [phone])    73-year-old female with history of SSS (dual-chamber pacemaker), atrial fibrillation and catheter ablation in 05/2023 who developed atrial fibrillation with RVR starting ~48 hrs ago. She also developed severe headache, has significantly elevated inflammation markers, giant cell arteritis is suspected. Temporal artery biopsy is scheduled for tomorrow afternoon.    She was started on IV amiodarone yesterday, today AF with RVR continues, heart rate in the 140 - 150s with minimal activity, such as walking to the bathroom. BP is soft.  -----------------------------------------------------------------    ASSESSMENT:  Atrial fibrillation with RVR. This has been the only sustained episode in ~1 year. She continues in AF with RVR today despite IV amiodarone. She has been on rivaroxaban without interruption. We will pursue cardioversion.  Risks/benefits of cardioversion under general anesthesia were discussed with the patient and her daughter. Small risk of TIA/CVA was mentioned. The patient has expressed understanding and agrees to proceed.    PLAN:  Proceed with cardioversion today. I have spoken to anesthesia and they will likely be able to proceed ~9 am.   Plan to stop iv amiodarone in a few hours.    Total Time: 40 min;   25 minutes spent in direct communication with patient / family and care coordination.             Interval History:     no new complaints          Review of Systems:     CONSTITUTIONAL: NEGATIVE for fever, chills, change in weight  ENT/MOUTH: NEGATIVE for ear, mouth and throat problems  RESP: NEGATIVE for significant cough or SOB  CV: NEGATIVE for chest pain, palpitations or peripheral edema          Physical Exam:      Blood pressure 113/84, pulse 107, temperature 97.9  F (36.6  C), temperature source Oral, resp. rate 16, height 1.524 m (5'), weight 79.6 kg (175 lb 6.4 oz), SpO2 98%.  Vitals:    08/24/24 0415 08/24/24 0544 08/25/24  "0602   Weight: 77.7 kg (171 lb 6.4 oz) 77.7 kg (171 lb 6.4 oz) 79.6 kg (175 lb 6.4 oz)     Vital Signs with Ranges  Temp:  [97.6  F (36.4  C)-98.6  F (37  C)] 97.9  F (36.6  C)  Pulse:  [] 107  Resp:  [16-19] 16  BP: ()/(61-95) 113/84  SpO2:  [93 %-98 %] 98 %  I/O's Last 24 hours  I/O last 3 completed shifts:  In: 600 [P.O.:600]  Out: -     GENERAL: alert and oriented, in no distress  HEENT:  normocephalic, atraumatic.  NECK:  normal JVP.  LUNGS:  clear bilaterally, no wheezes  CARDIOVASCULAR: tachycardic and irregular no gallop, murmur or rub  ABDOMEN: soft, NT  EXTREMITIES:  no edema  VASCULAR:  2+ carotids           Medications:        Current Facility-Administered Medications   Medication Dose Route Frequency Provider Last Rate Last Admin    furosemide (LASIX) tablet 20 mg  20 mg Oral Daily Edis Franklin MD   20 mg at 08/24/24 0907    [Held by provider] losartan (COZAAR) tablet 50 mg  50 mg Oral Daily Edis Franklin MD        predniSONE (DELTASONE) tablet 60 mg  60 mg Oral Daily Edis Franklin MD   60 mg at 08/25/24 0829    rivaroxaban ANTICOAGULANT (XARELTO) tablet 20 mg  20 mg Oral Daily Binta Castillo MD   20 mg at 08/25/24 0829    rosuvastatin (CRESTOR) tablet 20 mg  20 mg Oral QPM Edis Franklin MD   20 mg at 08/24/24 2159    traZODone (DESYREL) tablet 100 mg  100 mg Oral At Bedtime Edis Franklin MD   100 mg at 08/24/24 2200            Data:      All new lab and imaging data was reviewed.   Recent Labs   Lab Test 08/24/24  0608 08/23/24  1023   WBC 7.8 9.9   HGB 13.0 13.4   MCV 96 97    227   INR  --  3.37*      Recent Labs   Lab Test 08/25/24  0607 08/24/24  0608 08/23/24  1023   * 134* 137   POTASSIUM 4.0 4.4 4.0   CHLORIDE 97* 100 100   CO2 21* 22 23   BUN 17.3 11.1 12.5   CR 0.63 0.55 0.65   ANIONGAP 15 12 14   HASMUKH 8.9 8.9 9.0   * 137* 107*     No lab results found.    Invalid input(s): \"TROP\", \"TROPONINIES\"      EKG " results:  Reviewed

## 2024-08-26 ENCOUNTER — TELEPHONE (OUTPATIENT)
Dept: OTHER | Facility: CLINIC | Age: 74
End: 2024-08-26
Payer: COMMERCIAL

## 2024-08-26 ENCOUNTER — APPOINTMENT (OUTPATIENT)
Dept: ULTRASOUND IMAGING | Facility: CLINIC | Age: 74
DRG: 253 | End: 2024-08-26
Attending: INTERNAL MEDICINE
Payer: COMMERCIAL

## 2024-08-26 VITALS
TEMPERATURE: 97 F | HEART RATE: 74 BPM | WEIGHT: 174.2 LBS | BODY MASS INDEX: 34.2 KG/M2 | SYSTOLIC BLOOD PRESSURE: 118 MMHG | RESPIRATION RATE: 17 BRPM | OXYGEN SATURATION: 99 % | DIASTOLIC BLOOD PRESSURE: 68 MMHG | HEIGHT: 60 IN

## 2024-08-26 DIAGNOSIS — Z95.0 CARDIAC PACEMAKER IN SITU: ICD-10-CM

## 2024-08-26 DIAGNOSIS — I48.91 ATRIAL FIBRILLATION WITH RVR (H): Primary | ICD-10-CM

## 2024-08-26 LAB
ALBUMIN SERPL BCG-MCNC: 3.2 G/DL (ref 3.5–5.2)
ALP SERPL-CCNC: 104 U/L (ref 40–150)
ALT SERPL W P-5'-P-CCNC: 86 U/L (ref 0–50)
ANION GAP SERPL CALCULATED.3IONS-SCNC: 9 MMOL/L (ref 7–15)
AST SERPL W P-5'-P-CCNC: 49 U/L (ref 0–45)
BILIRUB DIRECT SERPL-MCNC: <0.2 MG/DL (ref 0–0.3)
BILIRUB SERPL-MCNC: 0.2 MG/DL
BUN SERPL-MCNC: 16.1 MG/DL (ref 8–23)
CALCIUM SERPL-MCNC: 8.7 MG/DL (ref 8.8–10.4)
CHLORIDE SERPL-SCNC: 101 MMOL/L (ref 98–107)
CREAT SERPL-MCNC: 0.67 MG/DL (ref 0.51–0.95)
EGFRCR SERPLBLD CKD-EPI 2021: 90 ML/MIN/1.73M2
GLUCOSE SERPL-MCNC: 91 MG/DL (ref 70–99)
HCO3 SERPL-SCNC: 27 MMOL/L (ref 22–29)
HOLD SPECIMEN: NORMAL
POTASSIUM SERPL-SCNC: 4 MMOL/L (ref 3.4–5.3)
PROT SERPL-MCNC: 5.9 G/DL (ref 6.4–8.3)
SODIUM SERPL-SCNC: 137 MMOL/L (ref 135–145)

## 2024-08-26 PROCEDURE — 37609 LIGATION/BX TEMPORAL ARTERY: CPT | Mod: 50 | Performed by: SURGERY

## 2024-08-26 PROCEDURE — 36415 COLL VENOUS BLD VENIPUNCTURE: CPT | Performed by: INTERNAL MEDICINE

## 2024-08-26 PROCEDURE — 250N000013 HC RX MED GY IP 250 OP 250 PS 637: Performed by: INTERNAL MEDICINE

## 2024-08-26 PROCEDURE — 03BS0ZX EXCISION OF RIGHT TEMPORAL ARTERY, OPEN APPROACH, DIAGNOSTIC: ICD-10-PCS | Performed by: SURGERY

## 2024-08-26 PROCEDURE — 360N000075 HC SURGERY LEVEL 2, PER MIN: Performed by: SURGERY

## 2024-08-26 PROCEDURE — 99239 HOSP IP/OBS DSCHRG MGMT >30: CPT | Performed by: HOSPITALIST

## 2024-08-26 PROCEDURE — 999N000141 HC STATISTIC PRE-PROCEDURE NURSING ASSESSMENT: Performed by: SURGERY

## 2024-08-26 PROCEDURE — 03BT0ZX EXCISION OF LEFT TEMPORAL ARTERY, OPEN APPROACH, DIAGNOSTIC: ICD-10-PCS | Performed by: SURGERY

## 2024-08-26 PROCEDURE — 250N000009 HC RX 250: Performed by: SURGERY

## 2024-08-26 PROCEDURE — 710N000009 HC RECOVERY PHASE 1, LEVEL 1, PER MIN: Performed by: SURGERY

## 2024-08-26 PROCEDURE — 250N000012 HC RX MED GY IP 250 OP 636 PS 637: Performed by: INTERNAL MEDICINE

## 2024-08-26 PROCEDURE — 88313 SPECIAL STAINS GROUP 2: CPT | Mod: TC | Performed by: SURGERY

## 2024-08-26 PROCEDURE — 250N000011 HC RX IP 250 OP 636: Performed by: SURGERY

## 2024-08-26 PROCEDURE — 258N000003 HC RX IP 258 OP 636: Performed by: ANESTHESIOLOGY

## 2024-08-26 PROCEDURE — 76705 ECHO EXAM OF ABDOMEN: CPT

## 2024-08-26 PROCEDURE — 272N000001 HC OR GENERAL SUPPLY STERILE: Performed by: SURGERY

## 2024-08-26 PROCEDURE — 80053 COMPREHEN METABOLIC PANEL: CPT | Performed by: INTERNAL MEDICINE

## 2024-08-26 RX ORDER — BUPIVACAINE HYDROCHLORIDE 5 MG/ML
INJECTION, SOLUTION PERINEURAL PRN
Status: DISCONTINUED | OUTPATIENT
Start: 2024-08-26 | End: 2024-08-26 | Stop reason: HOSPADM

## 2024-08-26 RX ORDER — SODIUM CHLORIDE, SODIUM LACTATE, POTASSIUM CHLORIDE, CALCIUM CHLORIDE 600; 310; 30; 20 MG/100ML; MG/100ML; MG/100ML; MG/100ML
INJECTION, SOLUTION INTRAVENOUS CONTINUOUS
Status: DISCONTINUED | OUTPATIENT
Start: 2024-08-26 | End: 2024-08-26 | Stop reason: HOSPADM

## 2024-08-26 RX ORDER — PREDNISONE 20 MG/1
60 TABLET ORAL DAILY
Qty: 90 TABLET | Refills: 0 | Status: SHIPPED | OUTPATIENT
Start: 2024-08-27

## 2024-08-26 RX ORDER — MAGNESIUM HYDROXIDE 1200 MG/15ML
LIQUID ORAL PRN
Status: DISCONTINUED | OUTPATIENT
Start: 2024-08-26 | End: 2024-08-26 | Stop reason: HOSPADM

## 2024-08-26 RX ADMIN — SODIUM CHLORIDE, POTASSIUM CHLORIDE, SODIUM LACTATE AND CALCIUM CHLORIDE: 600; 310; 30; 20 INJECTION, SOLUTION INTRAVENOUS at 11:58

## 2024-08-26 RX ADMIN — FUROSEMIDE 20 MG: 20 TABLET ORAL at 09:13

## 2024-08-26 RX ADMIN — PREDNISONE 60 MG: 20 TABLET ORAL at 09:13

## 2024-08-26 ASSESSMENT — ACTIVITIES OF DAILY LIVING (ADL)
ADLS_ACUITY_SCORE: 21
ADLS_ACUITY_SCORE: 20
ADLS_ACUITY_SCORE: 21
ADLS_ACUITY_SCORE: 21
ADLS_ACUITY_SCORE: 20
ADLS_ACUITY_SCORE: 20
ADLS_ACUITY_SCORE: 21
ADLS_ACUITY_SCORE: 20
ADLS_ACUITY_SCORE: 21
ADLS_ACUITY_SCORE: 21
ADLS_ACUITY_SCORE: 20
ADLS_ACUITY_SCORE: 21
ADLS_ACUITY_SCORE: 21
ADLS_ACUITY_SCORE: 20
ADLS_ACUITY_SCORE: 21
ADLS_ACUITY_SCORE: 20

## 2024-08-26 NOTE — CONSULTS
"NUTRITION EDUCATION    REASON FOR ASSESSMENT:  Nutrition education on American Heart Association (AHA) Heart Healthy Diet.    NUTRITION HISTORY:  Information obtained from pt and daughter in room, who's a nurse     Previous diet instructions:  None vs some from daughter but any guidance wasn't really received. Daughter also noted she's \"been Paleo for >20 years\" (duration may inaccurate)    Living situation:   Home by self     Grocery shopping:  Pt     Meal preparation:  Pt - although pt doesn't cook a ton and uses package foods    Breakfast:  2 slices of toast and fruit, 2 hard boiled eggs and fruit, or \"eggwhich\" breakfast sandwich products    Lunch:  Hummus w/ pretzels     Dinner:   Variable, could be leftovers, or packaged meals or out w/ family    - There's also some concerns for GI/inflammation issues. So we also briefly discussed a low FODMAP diet and returned later w/ a handout.      - Both noted the support of the daughter and suspect ongoing reinforcement of heart healthy guidelines will lead to better compliance.     CURRENT DIET ORDER:  NPO vs Low saturated fat, <2400 mg sodium diet    NUTRITION DIAGNOSIS:  Food- and nutrition-related knowledge deficit R/t no professional heart healthy diet education in the past  AEB pt report    INTERVENTIONS:  Nutrition Prescription:  Recommended AHA Heart Healthy Diet    Implementation:   Nutrition Education (Content):  reviewed Heart Healthy Diet guidelines  provided heart healthy diet handout, and a Low FODMAP diet (NCM 2017) per request  Nutrition Education (Application):  Discussed current eating habits and recommended alternative food choices  Anticipate good compliance  Diet Education - refer to Education flowsheet    Goals:  Pt and daughter verbalized understanding of diet by asking clarifying questions, restated the difference between saturated vs unsaturated fats, whole grains vs refined grains, the importance of the serving size on food labels, agreed to " track intake to observe a baseline and see where they can make some changes.  All of the above goals met during education session    Follow Up/Monitoring:  Provided RD contact information for future questions    Jules España RD, JOS[place] LUIS

## 2024-08-26 NOTE — TELEPHONE ENCOUNTER
IN HOUSE CASE RECEIVED ON 08/26/24 FOR:BIOPSY, ARTERY, TEMPORAL     CASE ID:[id number]     Spoke to [facility] , added case to add-list will reach out to provider once surgery is scheduled.

## 2024-08-26 NOTE — OR NURSING
Occupational Therapy      Patient Name:  Zbigniew Forman   MRN:  [id number]    Patient not seen today secondary to pt undergoing nsg care on 1st attempt; OT unable to make 2nd attempt in the PM. Will follow-up on next scheduled visit per OT POC.     Trisha Alcantar OT  7/10/2020   Pt came out and had to go to the bathroom. Assisted to bathroom. Floor called for report and pt to floor.

## 2024-08-26 NOTE — OP NOTE
OPERATIVE NOTE    PROCEDURE DATE: 8/26/2024      PRE-OP DIAGNOSIS: Persistent left-sided headaches-rule out giant cell arteritis      POST-OP DIAGNOSES: Same.      PROCEDURE PERFORMED: #1.  Left excisional temporal artery biopsy        #2.  Right excisional temporal artery biopsy      SURGEON:  Young French M.D.        ANESTHESIA:  Local      PRE-OP MEDICATIONS: None      INDICATIONS FOR PROCEDURE: 74-year-old patient has had left-sided headaches.  Not associated with visual changes.  Marked elevation of CRP.  She has been initiated on high-dose prednisone with improvement of her symptoms.  She comes In today for left followed by right temporal artery excisional biopsies.  No tenderness is appreciated.  Temporal arteries were difficult to palpate.  In preinduction both were identified with Doppler in the standard location.      DESCRIPTION OF PROCEDURE: Brought the operating room and placed in a sitting position.    LEFT TEMPORAL BIOPSY.  Left ear and scalp was prepped with Betadine.  Sterile drapes were applied.  Timeout was called.  1% lidocaine was injected in a field block fashion.  A 2 cm vertical incision was made anterior to the mid and upper ear.  Dissection was carried down electrocautery.  We identified a very small temporal vein.  The artery itself was also quite small measuring no more than 1 mm.  Very tortuous as expected.  Confirmed this is artery by Doppler.  Dissected free and ligated proximally distally with 4-0 silk suture and sent to pathology.  Wound was infiltrated with 0.5% Marcaine.  Subcutaneous tissue closed with interrupted 3-0 Vicryl and the skin with 5-0 Vicryl subcu to fashion followed by surgical adhesive.    RIGHT TEMPORAL BIOPSY: We then turned her head and prepped this in a similar fashion.  1% lidocaine injected field block fashion.  A 2 cm vertical incision was also made and dissection was carried down to identify the larger temporal vein.  The artery was immediately underlying  this but still quite small measuring approximately millimeter.  This is dissected free for a length of 2 cm and ligated proximally distally with 4-0 silk suture.  Specimen was excised and sent to pathology.  By Doppler there was venous flow but no arterial flow as expected.  Anesthetized with 0.5% Marcaine.  Closed in a similar fashion with interrupted 3-0 Vicryl deep and 5-0 Vicryl in subcu fashion followed by surgical adhesive    Patient tolerated procedure well.  Counts were correct      EBL: Less than 2 mL      COMPLICATIONS: None      OPERATIVE FINDINGS: Very small bilateral temporal arteries.  No obvious signs of arteritis pending pathology.      Young French MD

## 2024-08-26 NOTE — PLAN OF CARE
Date & Time: 8/26 0700-1615  Diagnosis: Afib RVR  Procedures: 8/25 - cardioversion, 8/26 - temporal artery biopsy  Orientation/Cognitive: AOx4  VS/O2: VSS, RA  Mobility: Independent  Diet: Cardiac  Pain Management: Denies - PRNs available  Neuro: Intact  Bowel & Bladder: Continent  Skin: Bilat temporal biopsy sites - JANET  Abnormal Labs: ALT 86, AST 49, trops 27->21, INR 3.43, CRP 76.22  Tele: NSR  IV Access/Drips/Fluids: R PIV SL  Drains: NA  Tests/Imaging: Liver US - mild hepatic steatosis  Consults: Neurology (signed off), vascular surgery, hospitalist, cardiology  Discharge Plan: Home 8/26    Discharge instructions & safety reviewed, all questions answered. Patient discharged home with daughter & all belongings.

## 2024-08-26 NOTE — DISCHARGE SUMMARY
[facility]  Hospitalist Discharge Summary      Date of Admission:  8/23/2024  Date of Discharge:  8/26/2024  Discharging Provider: James Correia MD  Discharge Service: Hospitalist Service    Discharge Diagnoses   Atrial fibrillation with RVR  History of atrial fibrillation s/p catheter ablation in May 2023  History of SSS s/p pacemaker placement in October 2022  Elevated troponins, likely demand ischemia  Upper respiratory illness  Severe left temporal headache, improved  Suspected out giant cell arteritis  Elevated LFTs  Non ischemic cardiomyopathy  Hypertension  Hyperlipidemia  BAMBI, not on CPAP  Age related macular degeneration    Clinically Significant Risk Factors     # Obesity: Estimated body mass index is 34.02 kg/m  as calculated from the following:    Height as of this encounter: 1.524 m (5').    Weight as of this encounter: 79 kg (174 lb 3.2 oz).       Follow-ups Needed After Discharge   Follow-up Appointments     Follow-up and recommended labs and tests       Follow up with primary care provider, Sky Samaniego, within 7 days for   hospital follow up.  The following labs/tests are recommended: CMP.  Follow up with cardiac electrophysiology in 2-3 months (Dr. Burgos at the   [facility]). The clinic should reach out to you to schedule   an appointment. You can contact the clinic at [phone] if you have any   questions about scheduling.  Follow up with Rheumatology regarding suspected giant cell arteritis. A   referral order has been placed and you will be contacted to arrange an   appointment.  Follow up with Dr. Guan or one of his colleagues at the [facility] regarding headache if your biopsy for giant cell   arteritis is negative. You can contact the [facility]   at [phone] to arrange an appointment if needed.            Unresulted Labs Ordered in the Past 30 Days of this Admission       Date and Time Order  Name Status Description    8/26/2024 12:40 PM Surgical Pathology Exam In process         These results will be followed up by Vascular Surgery, Primary Care Provider.    Discharge Disposition   Discharged to home  Condition at discharge: Stable    Hospital Course   Renate Tanner is a 73 year old female with PMH of atrial fibrillation s/p ablation and pacemaker, NICM, HTN, HLD, BAMBI not on CPAP, age-related macular degeneration, who presents with headache and atrial fibrillation with RVR.    Atrial fibrillation with RVR  History of atrial fibrillation s/p catheter ablation in May 2023  History of SSS s/p pacemaker placement in October 2022  Elevated troponins, likely demand ischemia  Follows with [facility] as an outpatient. She has been previously on sotalol and prolonged amiodarone therapy, it looks like last given 9/2023. She had a pacemaker placed for sick sinus syndrome. Interrogation of pacemaker at ED reportedly showed onset of afib in AM of 8/23. HR 140s, SBP 120s. TSH 2.55. Did not respond to metoprolol IV in the ED. Viral respiratory and possible giant cell arteritis as below could both potentially be underlying patient's afib.   - Admitted to inpatient.  - Electrophysiology consulted, appreciate their assistance.  - Initially on diltiazem infusion, transitioned to amiodarone infusion on 8/24/24 per Electrophysiology.  - S/p successful cardioversion on 8/25/24, has remained in sinus rhythm since then.  - Continue PTA Xarelto.  - No rate or rhythm control medications for now per Electrophysiology.  - Discharge home today.  - Follow up with Electrophysiology in 2-3 months as noted below.  - Discussed reasons to seek medical attention prior to follow up appointments.    Upper respiratory illness  Patient reported onset of cough, chills, flu like symptoms four days prior to admission, tested negative for COVID-19 at home. In the ED she had a negative respiratory viral panel, but COVID-19  PCR was not  tested. Her CXR does not show any pneumonia. Her CRP is markedly elevated to 174, which would be higher than expected for her clinical COVID severity (not on oxygen, CXR relatively clear).  - COVID-19 PCR negative on 8/23/24.  - Supportive care. Symptoms much improved.    Severe left temporal headache, improved  Rule out giant cell arteritis  Patient reported three days of a severe left temporal headache, she does not have headaches normally. She reported throbbing headache with some lightning type pain. It was associated with mild jaw claudication (eating seems to irritate the area). She denied vision changes however she has age-related macular degeneration. On exam she did not have tenderness to palpation over her left temporal area, but she curiously had two small nodular lesions in the anterior temporal region. Finally she had a markedly elevated CRP of 174 and ESR was 39.   - CT head showed no acute intracranial abnormality.  - High suspicion for giant cell arteritis.  - Prednisone 60mg daily started on 8/23/24 with rapid improvement in headache.  - Neurology consulted, appreciate their assistance.  - Vascular surgery consulted, appreciate their assistance.  - S/p bilateral temporal artery biopsy on 8/26/24, pathology pending.  - CRP initially 172, trended down to 76.22 on 8/25/24.  - Continue prednisone 60 mg daily upon discharge.  If giant cell arteritis diagnosis is confirmed, will likely be able to start tapering prednisone in 2-4 weeks.  - Rheumatology referral regarding suspected giant cell arteritis.  - Can follow up with Neurology in 3-4 weeks if she has ongoing headaches and biopsy does not confirm giant cell arteritis.    Elevated LFTs  Both AST and ALT were elevated to 90s. Unclear etiology initially, patient denied abdominal pain, no clear CHF.  - LFTs gradually trended down, but still slightly elevated on day of discharge.  - RUQ ultrasound showed findings consistent with mild hepatic steatosis.  -  Nutrition consulted.  - Recommended cessation of alcohol use.    Non ischemic cardiomyopathy  Hypertension  Hyperlipidemia  Patient did not appear to be in acute CHF. Last echo 6/2023 with EF 60%.  - PTA losartan held initially given soft BPs while on Amiodarone drip, resume upon discharge.  - Continue PTA lasix.    BAMBI, not on CPAP  - Noted.  - Continue PTA trazodone for sleep.    Age related macular degeneration  - Continue PTA multivitamin.    Consultations This Hospital Stay   NEUROLOGY IP CONSULT  SURGERY GENERAL IP CONSULT  CARDIOLOGY IP CONSULT  VASCULAR SURGERY IP CONSULT  VASCULAR ACCESS ADULT IP CONSULT  CARE MANAGEMENT / SOCIAL WORK IP CONSULT  NUTRITION SERVICES ADULT IP CONSULT    Code Status   Full Code    Time Spent on this Encounter   I, James Correia MD, personally saw the patient today and spent greater than 30 minutes discharging this patient.       James Correia MD  [facility] HEART CARE  [facility]  [place] [address]  Phone: [phone]  ______________________________________________________________________    Physical Exam   Vital Signs: Temp: 97  F (36.1  C) Temp src: Temporal BP: 118/68 Pulse: 74   Resp: 17 SpO2: 99 % O2 Device: None (Room air)    Weight: 174 lbs 3.2 oz  Constitutional: awake, alert, cooperative, no apparent distress, laying in the hospital bed with the head of the bed elevated  Respiratory: no increased work of breathing, clear to auscultation bilaterally, no crackles or wheezing  Cardiovascular: regular rate and rhythm, normal S1 and S2, no murmur noted  GI: normal bowel sounds, soft, non-distended, non-tender  Skin: warm, dry, bilateral temporal artery biopsy sites are clean/dry/intact with surgical adhesive in place  Musculoskeletal: no lower extremity pitting edema present  Neurologic: awake, alert, answers questions appropriately, moves all extremities       Primary Care Physician   Sky Samaniego    Discharge Orders       Adult Rheumatology  Referral      Reason for your hospital stay    Atrial fibrillation with rapid ventricular response, possible giant cell arteritis     Follow-up and recommended labs and tests     Follow up with primary care provider, Sky Samaniego, within 7 days for hospital follow up.  The following labs/tests are recommended: CMP.  Follow up with cardiac electrophysiology in 2-3 months (Dr. Burgos at the [facility]). The clinic should reach out to you to schedule an appointment. You can contact the clinic at [phone] if you have any questions about scheduling.  Follow up with Rheumatology regarding suspected giant cell arteritis. A referral order has been placed and you will be contacted to arrange an appointment.  Follow up with Dr. Guan or one of his colleagues at the [facility] regarding headache if your biopsy for giant cell arteritis is negative. You can contact the [facility] at [phone] to arrange an appointment if needed.     Activity    Your activity upon discharge: activity as tolerated     Diet    Follow this diet upon discharge: Low Saturated Fat, Sodium less than 2400 mg/day     Significant Results and Procedures   Most Recent 3 CBC's:  Recent Labs   Lab Test 08/24/24  0608 08/23/24  1023   WBC 7.8 9.9   HGB 13.0 13.4   MCV 96 97    227     Most Recent 3 BMP's:  Recent Labs   Lab Test 08/26/24  0605 08/25/24  0607 08/24/24  0608    133* 134*   POTASSIUM 4.0 4.0 4.4   CHLORIDE 101 97* 100   CO2 27 21* 22   BUN 16.1 17.3 11.1   CR 0.67 0.63 0.55   ANIONGAP 9 15 12   HASMUKH 8.7* 8.9 8.9   GLC 91 114* 137*     Results for orders placed or performed during the hospital encounter of 08/23/24   US Abdomen Limited    Narrative    US ABDOMEN LIMITED 8/26/2024 7:24 AM    CLINICAL HISTORY: elevated transaminase, elevated transaminases  TECHNIQUE: Limited abdominal ultrasound.    COMPARISON:  None.    FINDINGS:    GALLBLADDER: The gallbladder is normal. No gallstones, wall  thickening, or pericholecystic fluid. Negative sonographic Rosario's  sign.    BILE DUCTS: There is no biliary dilatation. The common duct measures  3mm.    LIVER: Slightly increased hepatic echogenicity. Small cysts.    RIGHT KIDNEY: No hydronephrosis.    PANCREAS: The visualized portions of the pancreas are normal.    No ascites.      Impression    IMPRESSION:  1.  Slightly increased hepatic echogenicity suggests mild hepatic  steatosis.    SHAILESH COLÓN MD         SYSTEM ID:  [id number]     Discharge Medications   Current Discharge Medication List        START taking these medications    Details   predniSONE (DELTASONE) 20 MG tablet Take 3 tablets (60 mg) by mouth daily.  Qty: 90 tablet, Refills: 0    Associated Diagnoses: Giant cell arteritis (H)           CONTINUE these medications which have NOT CHANGED    Details   alendronate (FOSAMAX) 70 MG tablet Take 70 mg by mouth every 7 days.      ascorbic acid (VITAMIN C) 1000 MG TABS Take 1,000 mg by mouth daily      Biotin 5000 MCG TABS Take 3 tablets by mouth daily.      Calcium Carbonate-Vit D-Min (CALCIUM 600+D PLUS MINERALS PO) Take 1 tablet by mouth daily      carboxymethylcellulose PF (REFRESH PLUS) 0.5 % ophthalmic solution Place 1 drop into both eyes 4 times daily as needed for dry eyes.      fexofenadine (ALLEGRA ALLERGY) 180 MG tablet Take 180 mg by mouth daily      fish oil-omega-3 fatty acids 1000 MG capsule Take 1 g by mouth daily.      furosemide (LASIX) 20 MG tablet Take 20 mg by mouth daily      GLUCOSAMINE-CHONDROITIN PO Take 1 tablet by mouth daily. 1500-1200mg      HEMP OIL OR EXTRACT OR OTHER CBD CANNABINOID, NOT MEDICAL CANNABIS, Take 1 capsule by mouth every evening. CBD capsule      HYDROcodone-acetaminophen (NORCO) 5-325 MG tablet Take 0.5-1 tablets by mouth daily as needed for breakthrough pain.      losartan (COZAAR) 50 MG tablet Take 50 mg by mouth daily       Multiple Vitamins-Minerals (PRESERVISION AREDS 2) CAPS Take 1 capsule by mouth 2 times daily.      multivitamin, therapeutic (THERA-VIT) TABS tablet Take 1 tablet by mouth daily.      rivaroxaban ANTICOAGULANT (XARELTO) 20 MG TABS tablet Take 20 mg by mouth daily.      rosuvastatin (CRESTOR) 20 MG tablet Take 20 mg by mouth every evening.      simethicone (MYLICON) 125 MG chewable tablet Take 125 mg by mouth 2 times daily.      traZODone (DESYREL) 50 MG tablet Take 100 mg by mouth at bedtime.           Allergies   Allergies   Allergen Reactions    Seasonal Allergies

## 2024-08-26 NOTE — PROGRESS NOTES
[place] [facility]    Working on TA biopsy later today.    NPO after 0830.  OK for Xarelto- hold todays dose and will give after surgery      Young French MD

## 2024-08-26 NOTE — PROGRESS NOTES
Pt here now for temporal artery biopsy- planning on surgery around 1600 per patient/cardiology note(vascular surgery still hasn't put in any orders yet).  A&O. VSS. Tele nsr.   Patient was told by doctor that she can have an early breakfast.  Up indpt.  Went to an abdominal ultrasound at 0635. Denies pain. Pt scoring green on the Aggression Stop Light Tool.

## 2024-08-26 NOTE — PROGRESS NOTES
Per request, pt transferring cares to [facility] and would like to be seen in [place].     Will route message to device techs to work on device transfer.     Orders placed for cardiology follow up and in clinic device check.

## 2024-08-27 LAB
MDC_IDC_LEAD_CONNECTION_STATUS: NORMAL
MDC_IDC_LEAD_CONNECTION_STATUS: NORMAL
MDC_IDC_LEAD_IMPLANT_DT: NORMAL
MDC_IDC_LEAD_IMPLANT_DT: NORMAL
MDC_IDC_LEAD_LOCATION: NORMAL
MDC_IDC_LEAD_LOCATION: NORMAL
MDC_IDC_LEAD_LOCATION_DETAIL_1: NORMAL
MDC_IDC_LEAD_LOCATION_DETAIL_1: NORMAL
MDC_IDC_LEAD_MFG: NORMAL
MDC_IDC_LEAD_MFG: NORMAL
MDC_IDC_LEAD_MODEL: NORMAL
MDC_IDC_LEAD_MODEL: NORMAL
MDC_IDC_LEAD_POLARITY_TYPE: NORMAL
MDC_IDC_LEAD_POLARITY_TYPE: NORMAL
MDC_IDC_LEAD_SERIAL: NORMAL
MDC_IDC_LEAD_SERIAL: NORMAL
MDC_IDC_PG_IMPLANT_DTM: NORMAL
MDC_IDC_PG_MFG: NORMAL
MDC_IDC_PG_MODEL: NORMAL
MDC_IDC_PG_SERIAL: NORMAL
MDC_IDC_PG_TYPE: NORMAL
MDC_IDC_SESS_CLINIC_NAME: NORMAL
MDC_IDC_SESS_DTM: NORMAL
MDC_IDC_SESS_TYPE: NORMAL

## 2024-08-28 ENCOUNTER — TELEPHONE (OUTPATIENT)
Dept: CARDIOLOGY | Facility: CLINIC | Age: 74
End: 2024-08-28
Payer: COMMERCIAL

## 2024-08-28 NOTE — TELEPHONE ENCOUNTER
Patient was admitted to [facility] on 8/23/24 who presents with headache and atrial fibrillation with RVR. Initially on Diltiazem infusion, transitioned to Amiodarone infusion on 8/24/24 per Electrophysiology.     PMH: atrial fibrillation s/p ablation and pacemaker ([facility]), NICM, HTN, HLD, BAMBI not on CPAP, age-related macular degeneration.    8/25/24: Successful DCCV to NSR after shock x 1.    Severe left temporal headache-started on Prednisone.    Pt requests to follow with our cardiology clinic as closer to her home.    Pt was started on Prednisone. PTA Xarelto was continued at time of discharge.    Pt is scheduled on 11/12/24 at 1405 for a PPM check, followed with OV at 1525 with Dr. Burgos at our [place] [facility].    Writer attempted to call pt for a cardiology post discharge phone call, but no answer. VM left to call back with any non emergent cardiac related questions. Reminder left of appt's as scheduled above. Writer's phone number was provided. MARY Villalobos RN.

## 2024-08-29 ENCOUNTER — TELEPHONE (OUTPATIENT)
Dept: OTHER | Facility: CLINIC | Age: 74
End: 2024-08-29
Payer: COMMERCIAL

## 2024-08-29 LAB
PATH REPORT.COMMENTS IMP SPEC: NORMAL
PATH REPORT.FINAL DX SPEC: NORMAL
PATH REPORT.GROSS SPEC: NORMAL
PATH REPORT.MICROSCOPIC SPEC OTHER STN: NORMAL
PATH REPORT.MICROSCOPIC SPEC OTHER STN: NORMAL
PATH REPORT.RELEVANT HX SPEC: NORMAL
PHOTO IMAGE: NORMAL

## 2024-08-29 PROCEDURE — 88313 SPECIAL STAINS GROUP 2: CPT | Mod: 26 | Performed by: PATHOLOGY

## 2024-08-29 PROCEDURE — 88305 TISSUE EXAM BY PATHOLOGIST: CPT | Mod: 26 | Performed by: PATHOLOGY

## 2024-08-29 NOTE — TELEPHONE ENCOUNTER
[place] [facility]    I spoke with Renate Tanner this afternoon about her bilateral temporal artery biopsy.  She had left-sided headaches with an elevated CRP and suspicious for giant cell arteritis.  She had been started on prednisone with improvement of her symptoms.    Bilateral temporal artery excisional biopsies performed on 8/26/2024.  She had very small arteries.  Final pathology is returned showing no evidence of all of giant cell arteritis in either artery.  She was discharged home following the procedure later that day.      Reports that she is doing well.  Incisions work closed with subcuticular dissolvable suture and also surgical adhesive.  She is having no pain at the site.    She will remain on her prednisone until she has an appointment with rheumatology who will decide on prednisone tapering very likely following this with her CRP levels.    No specific follow-up with me as indicated.    Young French MD

## 2024-08-29 NOTE — TELEPHONE ENCOUNTER
Routing to Dr. French to contact patient with results.    Deja Santos, BSN, RN, [facility] Vascular [facility]

## 2024-08-29 NOTE — TELEPHONE ENCOUNTER
[facility]    Who is the name of the provider?:  CORBIN LUCIO   What is the location you see this provider at/preferred location?: [place]  Person calling / Facility: Renate Tnaner  Phone number:  [phone] (home)   Nurse call back needed:  Yes     Reason for call:  Patient recently had an autopsy done by Dr. Lucio 8/26/2024. Patient is unable to see results via [employer] like usual. Patient has tried contacting [facility] but has not been able to obtain her results. Please call patient and advise on expected wait time for results, how she can receive results, etc.       Can we leave a detailed message on this number?  YES     8/29/2024, 12:49 PM

## 2024-09-10 ENCOUNTER — DOCUMENTATION ONLY (OUTPATIENT)
Dept: CARDIOLOGY | Facility: CLINIC | Age: 74
End: 2024-09-10

## 2024-09-13 ENCOUNTER — TELEPHONE (OUTPATIENT)
Dept: CARDIOLOGY | Facility: CLINIC | Age: 74
End: 2024-09-13
Payer: COMMERCIAL

## 2024-09-13 NOTE — TELEPHONE ENCOUNTER
Alert for Afib with presenting EGM showing Afib with RVR with V-rates 60-160bpm per histogram. Pt recently admitted to the [facility] 8/23/24 for Afib with RVR and possible giant cell arteritis. Pt cardioverted 8/25/24 and was not sent home with rate or rhythm control medications.     Called pt and she stated that she had a recent visit to the hospital on 9/10/24 for afib with RVR and was started on Sotalol in the [facility] system.   She also stated that she has an AVNA planned in the future with them.  I did tell her that we are currently getting her alerts on [employer] and that she should call her device clinic and let them know to transfer her alerts back to them.     She stated for the time being that she is going to stay in the [facility] system for her device care.

## 2024-09-16 ENCOUNTER — TELEPHONE (OUTPATIENT)
Dept: RHEUMATOLOGY | Facility: CLINIC | Age: 74
End: 2024-09-16
Payer: COMMERCIAL

## 2024-09-16 NOTE — TELEPHONE ENCOUNTER
[facility] Call Center    Phone Message    May a detailed message be left on voicemail: yes     Reason for Call: Other: Eveline Request   Patient requesting a call back from Eveline. Please follow-up. Thank you.     Action Taken: Other: Rheum    Travel Screening: Not Applicable     Date of Service: 9/16/24

## 2024-09-17 NOTE — TELEPHONE ENCOUNTER
Pt called stating she would like to speak to Eveline Jarvis, she believe she work with Dr Hernandez. Per pt she stated that she would like to speak to Eveline regarding an earlier apt with Dr Hong and if possible, pt would like to have the apt sometime this week or next week. Per pt, please follow-up with pt. Thank you    oral

## 2024-09-17 NOTE — CONFIDENTIAL NOTE
Call back received from pt, she is still trying to reach Eveline and is concerned that the earlier messages have not been getting to her. Confirmed with pt that her earlier messages did get sent to Eveline, and that we would let her know that pt is very anxious to speak with her as soon as possible.  Pt was informed that there is currently an earlier appt available with Dr. Hong 11/13/24, offered to reschedule her appt for that date: pt responded that no, she wanted her appt to be left the way it is because 11/13/24 is still not early enough.

## 2024-09-19 NOTE — TELEPHONE ENCOUNTER
Patient scheduled to see Dr Hong 9/24/24 at 9 am. Patient reports she spoke to her insurance provider and was informed that she can see Dr. Hong as an out of network provider.  Eveline Jarvis RN  Adult Rheumatology Clinic

## 2024-09-24 ENCOUNTER — LAB (OUTPATIENT)
Dept: LAB | Facility: CLINIC | Age: 74
End: 2024-09-24
Payer: COMMERCIAL

## 2024-09-24 ENCOUNTER — OFFICE VISIT (OUTPATIENT)
Dept: RHEUMATOLOGY | Facility: CLINIC | Age: 74
End: 2024-09-24
Attending: HOSPITALIST
Payer: COMMERCIAL

## 2024-09-24 VITALS
HEART RATE: 102 BPM | WEIGHT: 164.9 LBS | OXYGEN SATURATION: 100 % | HEIGHT: 60 IN | SYSTOLIC BLOOD PRESSURE: 109 MMHG | BODY MASS INDEX: 32.38 KG/M2 | DIASTOLIC BLOOD PRESSURE: 73 MMHG

## 2024-09-24 DIAGNOSIS — Z11.1 TUBERCULOSIS SCREENING: ICD-10-CM

## 2024-09-24 DIAGNOSIS — M31.8 SYSTEMIC VASCULITIS (H): Primary | ICD-10-CM

## 2024-09-24 DIAGNOSIS — Z11.59 NEED FOR HEPATITIS B SCREENING TEST: ICD-10-CM

## 2024-09-24 DIAGNOSIS — M31.6 GIANT CELL ARTERITIS (H): ICD-10-CM

## 2024-09-24 DIAGNOSIS — R91.8 OTHER NONSPECIFIC ABNORMAL FINDING OF LUNG FIELD: ICD-10-CM

## 2024-09-24 DIAGNOSIS — Z11.59 NEED FOR HEPATITIS C SCREENING TEST: ICD-10-CM

## 2024-09-24 DIAGNOSIS — M31.8 SYSTEMIC VASCULITIS (H): ICD-10-CM

## 2024-09-24 LAB
ALBUMIN UR-MCNC: NEGATIVE MG/DL
ALT SERPL W P-5'-P-CCNC: 34 U/L (ref 0–50)
APPEARANCE UR: CLEAR
AST SERPL W P-5'-P-CCNC: 28 U/L (ref 0–45)
BACTERIA #/AREA URNS HPF: ABNORMAL /HPF
BASOPHILS # BLD AUTO: 0 10E3/UL (ref 0–0.2)
BASOPHILS NFR BLD AUTO: 0 %
BILIRUB UR QL STRIP: NEGATIVE
COLOR UR AUTO: YELLOW
CREAT SERPL-MCNC: 0.83 MG/DL (ref 0.51–0.95)
CRP SERPL-MCNC: <3 MG/L
EGFRCR SERPLBLD CKD-EPI 2021: 73 ML/MIN/1.73M2
EOSINOPHIL # BLD AUTO: 0 10E3/UL (ref 0–0.7)
EOSINOPHIL NFR BLD AUTO: 0 %
ERYTHROCYTE [DISTWIDTH] IN BLOOD BY AUTOMATED COUNT: 15.1 % (ref 10–15)
ERYTHROCYTE [SEDIMENTATION RATE] IN BLOOD BY WESTERGREN METHOD: 4 MM/HR (ref 0–30)
GLUCOSE UR STRIP-MCNC: NEGATIVE MG/DL
HBV SURFACE AB SERPL IA-ACNC: <3.5 M[IU]/ML
HBV SURFACE AB SERPL IA-ACNC: NONREACTIVE M[IU]/ML
HCT VFR BLD AUTO: 43.4 % (ref 35–47)
HCV AB SERPL QL IA: NONREACTIVE
HGB BLD-MCNC: 14.7 G/DL (ref 11.7–15.7)
HGB UR QL STRIP: NEGATIVE
IMM GRANULOCYTES # BLD: 0.1 10E3/UL
IMM GRANULOCYTES NFR BLD: 1 %
KETONES UR STRIP-MCNC: NEGATIVE MG/DL
LEUKOCYTE ESTERASE UR QL STRIP: ABNORMAL
LYMPHOCYTES # BLD AUTO: 0.9 10E3/UL (ref 0.8–5.3)
LYMPHOCYTES NFR BLD AUTO: 8 %
MCH RBC QN AUTO: 33 PG (ref 26.5–33)
MCHC RBC AUTO-ENTMCNC: 33.9 G/DL (ref 31.5–36.5)
MCV RBC AUTO: 98 FL (ref 78–100)
MONOCYTES # BLD AUTO: 0.5 10E3/UL (ref 0–1.3)
MONOCYTES NFR BLD AUTO: 5 %
MUCOUS THREADS #/AREA URNS LPF: PRESENT /LPF
NEUTROPHILS # BLD AUTO: 10.4 10E3/UL (ref 1.6–8.3)
NEUTROPHILS NFR BLD AUTO: 87 %
NITRATE UR QL: NEGATIVE
PH UR STRIP: 7 [PH] (ref 5–7)
PLATELET # BLD AUTO: 224 10E3/UL (ref 150–450)
RBC # BLD AUTO: 4.45 10E6/UL (ref 3.8–5.2)
RBC #/AREA URNS AUTO: ABNORMAL /HPF
SP GR UR STRIP: 1.01 (ref 1–1.03)
SQUAMOUS #/AREA URNS AUTO: ABNORMAL /LPF
TOTAL PROTEIN SERUM FOR ELP: 6.7 G/DL (ref 6.4–8.3)
UROBILINOGEN UR STRIP-ACNC: 0.2 E.U./DL
WBC # BLD AUTO: 11.9 10E3/UL (ref 4–11)
WBC #/AREA URNS AUTO: ABNORMAL /HPF

## 2024-09-24 PROCEDURE — 82565 ASSAY OF CREATININE: CPT

## 2024-09-24 PROCEDURE — 85652 RBC SED RATE AUTOMATED: CPT

## 2024-09-24 PROCEDURE — 83516 IMMUNOASSAY NONANTIBODY: CPT

## 2024-09-24 PROCEDURE — 84155 ASSAY OF PROTEIN SERUM: CPT

## 2024-09-24 PROCEDURE — 86704 HEP B CORE ANTIBODY TOTAL: CPT

## 2024-09-24 PROCEDURE — 84460 ALANINE AMINO (ALT) (SGPT): CPT

## 2024-09-24 PROCEDURE — 83876 ASSAY MYELOPEROXIDASE: CPT

## 2024-09-24 PROCEDURE — 86036 ANCA SCREEN EACH ANTIBODY: CPT

## 2024-09-24 PROCEDURE — 84165 PROTEIN E-PHORESIS SERUM: CPT | Performed by: PATHOLOGY

## 2024-09-24 PROCEDURE — 85025 COMPLETE CBC W/AUTO DIFF WBC: CPT

## 2024-09-24 PROCEDURE — 99205 OFFICE O/P NEW HI 60 MIN: CPT | Performed by: STUDENT IN AN ORGANIZED HEALTH CARE EDUCATION/TRAINING PROGRAM

## 2024-09-24 PROCEDURE — 81001 URINALYSIS AUTO W/SCOPE: CPT

## 2024-09-24 PROCEDURE — 86481 TB AG RESPONSE T-CELL SUSP: CPT

## 2024-09-24 PROCEDURE — 87340 HEPATITIS B SURFACE AG IA: CPT

## 2024-09-24 PROCEDURE — 86140 C-REACTIVE PROTEIN: CPT

## 2024-09-24 PROCEDURE — G0463 HOSPITAL OUTPT CLINIC VISIT: HCPCS | Performed by: STUDENT IN AN ORGANIZED HEALTH CARE EDUCATION/TRAINING PROGRAM

## 2024-09-24 PROCEDURE — 86706 HEP B SURFACE ANTIBODY: CPT

## 2024-09-24 PROCEDURE — 86803 HEPATITIS C AB TEST: CPT

## 2024-09-24 PROCEDURE — 36415 COLL VENOUS BLD VENIPUNCTURE: CPT

## 2024-09-24 PROCEDURE — 84450 TRANSFERASE (AST) (SGOT): CPT

## 2024-09-24 RX ORDER — SULFAMETHOXAZOLE/TRIMETHOPRIM 800-160 MG
1 TABLET ORAL DAILY
Qty: 60 TABLET | Refills: 0 | Status: SHIPPED | OUTPATIENT
Start: 2024-09-24 | End: 2024-11-23

## 2024-09-24 RX ORDER — PREDNISONE 5 MG/1
TABLET ORAL
Qty: 238 TABLET | Refills: 0 | Status: SHIPPED | OUTPATIENT
Start: 2024-09-24 | End: 2024-11-12

## 2024-09-24 ASSESSMENT — ENCOUNTER SYMPTOMS
HEMATURIA: 0
BLOOD IN STOOL: 0
DYSURIA: 0
SINUS PAIN: 0
EYE REDNESS: 0

## 2024-09-24 ASSESSMENT — PAIN SCALES - GENERAL: PAINLEVEL: NO PAIN (0)

## 2024-09-24 NOTE — LETTER
9/24/2024       RE: Renate Tanner  [address] [id number]     Dear Colleague,    Thank you for referring your patient, Renate Tanner, to the [facility] RHEUMATOLOGY at [facility]. Please see a copy of my visit note below.    RHEUMATOLOGY OUTPATIENT CLINIC NOTE     Referring Provider:   James Correia MD  [id number] [place] [id number]    Lab review     Temporal artery biopsy:   A.  Left temporal artery, biopsy-Negative for Temporal (Giant Cell) arteritis.  B.  Right temporal artery, biopsy-Negative for Temporal (Giant Cell) arteritis.  Focus of adventitial only chronic inflammation which is considered nonspecific.    Subjective    Visit date September 24, 2024    Renate is a 74 year old female who presents today for consult.  She is here with her daughter Firda.    She was hospitalized on August 23, 2024 for headaches;    She had new onset left-sided headache for few days, some tenderness to touch, jaw pain (constant even without chewing food) no vision changes, no progressive stiffness in the shoulders and the hips at the time but had fevers feeling tired and fatigued. There was initial concern for COVID, however COVID test was negative. Labs at the time showed elevated ESR 39, , started on prednisone 60 mg daily and underwent Temporal artery biopsy bilaterally reported as negative for arteritis however there was adventitial chronic inflammation on the right side.  He felt immediately better after steroids with resolution of headaches and improvement in general condition and afterwards she was having more energy and able to do more work.     Follows with retina specialist for macular degeneration and getting injections. there was no concerns for giant cell arteritis from Ophthalmologist  She has been having joint pain for many years involving (hands. knees, low back), sometimes with morning stiffness, unclear if  improves with activity, her joints are better steroids   Review of Systems   HENT:  Negative for congestion, hearing loss, nosebleeds and sinus pain.    Eyes:  Negative for redness.   Gastrointestinal:  Negative for blood in stool.   Genitourinary:  Negative for dysuria and hematuria.   Musculoskeletal:  Positive for joint pain.   Skin:  Negative for rash.     Today, Renate mentioned the following:    She has been doing better in the past week or so,   Denies new headaches, scalp tenderness, jaw claudication, vision changes  Denies stiffness in the shoulders or the hips  Denies fever, chills   Denies cough but remains with exertional shortness of breath and palpitations related to atrial fibrillation   She sometimes has cramps in in her left arm, denies cramps in the legs with walking but has back pain that improves with leaning forwards     Former smoker, quit 18 years ago  Family history of lupus [daughter]  Ancestry from zain, Graciela     Current Medications   Prednisone 40 mg daily for 2 weeks at least     Past Medical history   Past Medical History:   Diagnosis Date     Arrhythmia      Pacemaker        Objective  /73 (BP Location: Right arm)   Pulse 102   Ht 1.524 m (5')   Wt 74.8 kg (164 lb 14.4 oz)   SpO2 100%   BMI 32.20 kg/m        PHYSICAL EXAMINATION  Physical Exam  HENT:      Mouth/Throat:      Mouth: Mucous membranes are moist.   Eyes:      Conjunctiva/sclera: Conjunctivae normal.   Cardiovascular:      Heart sounds: Normal heart sounds.   Pulmonary:      Effort: Pulmonary effort is normal.      Breath sounds: Normal breath sounds.   Musculoskeletal:         General: No swelling or tenderness.      Cervical back: Normal range of motion.   Skin:     Findings: No rash.   Neurological:      Mental Status: She is alert.       Assessment & Plan    # Concern for giant cell arteritis  # New onset cranial symptoms [headache, scalp tenderness] and systemic symptoms  # Elevated inflammatory markers,  responded to steroids  # Chronic joint pain    # Screening for large vessel vasculitis  # Screening for chronic infections     Renate is a very pleasant 74-year-old female referred to rheumatology for evaluation of giant cell arteritis.  Her history with new onset headaches with scalp tenderness, cough, systemic symptoms at onset along with elevated inflammatory markers and improvement of symptoms as well as labs with steroids are consistent with giant cell arteritis.  She denies symptoms suggestive of ANCA vasculitis.  She had bilateral temporal artery biopsy while it has been reported as negative, there was a focus of adventitial inflammation on the right side which could be indicative of an inflammatory process.  She is currently doing well on steroids and is asymptomatic.      We discussed getting additional evaluation including lab test for ANCA serologies, urinalysis, updated inflammatory markers and basic labs as well as temporal artery ultrasound to evaluate for active arthritis, knowing that results can be false negative being on steroids however if positive then this would further confirm the diagnosis.  We will also get screening for large vessel involvement with either PET scan or CTA of the chest and abdomen.     In the meantime, she will continue on prednisone with slow taper and we will start Bactrim for PJP prophylaxis.  I recommended that she get us records from her ophthalmologist.    Plan:  - Additional labs as above  - Temporal artery ultrasound  - Large vessel screening with PET scan or CT angiogram  - Continue prednisone as below  - Start Bactrim for PJP prophylaxis    Prednisone tapering schedule:  Start prednisone 35 mg daily for 1 week then 30 mg daily for 1 week then 25 mg daily for 1 week then 20 mg daily till the next follow-up appointment        60 minutes spent by me on the date of the encounter doing chart review, history and exam, documentation and further activities per the  note      Return in about 5 weeks (around 10/29/2024) for Follow up.    Tomeka Hong MD  [facility] RHEUMATOLOGY      Again, thank you for allowing me to participate in the care of your patient.      Sincerely,    Tomeka Hong MD

## 2024-09-24 NOTE — Clinical Note
Hello team,  The plan has changed. The insurance is requesting additional information for PET and might not be approved and may opt for CTA instead.    Can we then please arrange for ultrasound to be done as soon as can rather than with next appointment.   Thank you

## 2024-09-24 NOTE — NURSING NOTE
Chief Complaint   Patient presents with    Consult     New Patient     /70   Pulse 102   Ht 1.524 m (5')   Wt 74.8 kg (164 lb 14.4 oz)   SpO2 100%   BMI 32.20 kg/m    Kristel Castro on 9/24/2024 at 9:08 AM

## 2024-09-24 NOTE — PATIENT INSTRUCTIONS
Our plan for today is:    - Tests:  Lab blood work today  Ultrasound and imaging in 4 weeks    - Medications:  Start prednisone 35 mg daily for 1 week then 30 mg daily for 1 week then 25 mg daily for 1 week then 20 mg daily till the next follow-up appointment  Start Bactrim for PJP prophylaxis once daily till the next follow-up appointment

## 2024-09-24 NOTE — PROGRESS NOTES
RHEUMATOLOGY OUTPATIENT CLINIC NOTE     Referring Provider:   James Correia MD  [address] [place] [id number]    Lab review     Temporal artery biopsy:   A.  Left temporal artery, biopsy-Negative for Temporal (Giant Cell) arteritis.  B.  Right temporal artery, biopsy-Negative for Temporal (Giant Cell) arteritis.  Focus of adventitial only chronic inflammation which is considered nonspecific.    Subjective     Visit date September 24, 2024    Renate is a 74 year old female who presents today for consult.  She is here with her daughter Frida.    She was hospitalized on August 23, 2024 for headaches;    She had new onset left-sided headache for few days, some tenderness to touch, jaw pain (constant even without chewing food) no vision changes, no progressive stiffness in the shoulders and the hips at the time but had fevers feeling tired and fatigued. There was initial concern for COVID, however COVID test was negative. Labs at the time showed elevated ESR 39, , started on prednisone 60 mg daily and underwent Temporal artery biopsy bilaterally reported as negative for arteritis however there was adventitial chronic inflammation on the right side.  He felt immediately better after steroids with resolution of headaches and improvement in general condition and afterwards she was having more energy and able to do more work.     Follows with retina specialist for macular degeneration and getting injections. there was no concerns for giant cell arteritis from Ophthalmologist  She has been having joint pain for many years involving (hands. knees, low back), sometimes with morning stiffness, unclear if improves with activity, her joints are better steroids   Review of Systems   HENT:  Negative for congestion, hearing loss, nosebleeds and sinus pain.    Eyes:  Negative for redness.   Gastrointestinal:  Negative for blood in stool.   Genitourinary:  Negative for dysuria and hematuria.   Musculoskeletal:   Positive for joint pain.   Skin:  Negative for rash.     Today, Renate mentioned the following:    She has been doing better in the past week or so,   Denies new headaches, scalp tenderness, jaw claudication, vision changes  Denies stiffness in the shoulders or the hips  Denies fever, chills   Denies cough but remains with exertional shortness of breath and palpitations related to atrial fibrillation   She sometimes has cramps in in her left arm, denies cramps in the legs with walking but has back pain that improves with leaning forwards     Former smoker, quit 18 years ago  Family history of lupus [daughter]  Ancestry from zain, Graciela     Current Medications   Prednisone 40 mg daily for 2 weeks at least     Past Medical history   Past Medical History:   Diagnosis Date    Arrhythmia     Pacemaker        Objective   /73 (BP Location: Right arm)   Pulse 102   Ht 1.524 m (5')   Wt 74.8 kg (164 lb 14.4 oz)   SpO2 100%   BMI 32.20 kg/m        PHYSICAL EXAMINATION  Physical Exam  HENT:      Mouth/Throat:      Mouth: Mucous membranes are moist.   Eyes:      Conjunctiva/sclera: Conjunctivae normal.   Cardiovascular:      Heart sounds: Normal heart sounds.   Pulmonary:      Effort: Pulmonary effort is normal.      Breath sounds: Normal breath sounds.   Musculoskeletal:         General: No swelling or tenderness.      Cervical back: Normal range of motion.   Skin:     Findings: No rash.   Neurological:      Mental Status: She is alert.       Assessment & Plan     # Concern for giant cell arteritis  # New onset cranial symptoms [headache, scalp tenderness] and systemic symptoms  # Elevated inflammatory markers, responded to steroids  # Chronic joint pain    # Screening for large vessel vasculitis  # Screening for chronic infections     Renate is a very pleasant 74-year-old female referred to rheumatology for evaluation of giant cell arteritis.  Her history with new onset headaches with scalp tenderness, cough,  systemic symptoms at onset along with elevated inflammatory markers and improvement of symptoms as well as labs with steroids are consistent with giant cell arteritis.  She denies symptoms suggestive of ANCA vasculitis.  She had bilateral temporal artery biopsy while it has been reported as negative, there was a focus of adventitial inflammation on the right side which could be indicative of an inflammatory process.  She is currently doing well on steroids and is asymptomatic.      We discussed getting additional evaluation including lab test for ANCA serologies, urinalysis, updated inflammatory markers and basic labs as well as temporal artery ultrasound to evaluate for active arthritis, knowing that results can be false negative being on steroids however if positive then this would further confirm the diagnosis.  We will also get screening for large vessel involvement with either PET scan or CTA of the chest and abdomen.     In the meantime, she will continue on prednisone with slow taper and we will start Bactrim for PJP prophylaxis.  I recommended that she get us records from her ophthalmologist.    Plan:  - Additional labs as above  - Temporal artery ultrasound  - Large vessel screening with PET scan or CT angiogram  - Continue prednisone as below  - Start Bactrim for PJP prophylaxis    Prednisone tapering schedule:  Start prednisone 35 mg daily for 1 week then 30 mg daily for 1 week then 25 mg daily for 1 week then 20 mg daily till the next follow-up appointment        60 minutes spent by me on the date of the encounter doing chart review, history and exam, documentation and further activities per the note      Return in about 5 weeks (around 10/29/2024) for Follow up.    Tomeka Hong MD  [facility] RHEUMATOLOGY

## 2024-09-25 LAB
ALBUMIN SERPL ELPH-MCNC: 4.2 G/DL (ref 3.7–5.1)
ALPHA1 GLOB SERPL ELPH-MCNC: 0.3 G/DL (ref 0.2–0.4)
ALPHA2 GLOB SERPL ELPH-MCNC: 0.8 G/DL (ref 0.5–0.9)
ANCA AB PATTERN SER IF-IMP: NORMAL
B-GLOBULIN SERPL ELPH-MCNC: 0.8 G/DL (ref 0.6–1)
C-ANCA TITR SER IF: NORMAL {TITER}
GAMMA GLOB SERPL ELPH-MCNC: 0.6 G/DL (ref 0.7–1.6)
GAMMA INTERFERON BACKGROUND BLD IA-ACNC: 0.01 IU/ML
HBV CORE AB SERPL QL IA: NONREACTIVE
HBV SURFACE AG SERPL QL IA: NONREACTIVE
LOCATION OF TASK: ABNORMAL
M PROTEIN SERPL ELPH-MCNC: 0 G/DL
M TB IFN-G BLD-IMP: NEGATIVE
M TB IFN-G CD4+ BCKGRND COR BLD-ACNC: 9.99 IU/ML
MITOGEN IGNF BCKGRD COR BLD-ACNC: 0 IU/ML
MITOGEN IGNF BCKGRD COR BLD-ACNC: 0.01 IU/ML
PROT PATTERN SERPL ELPH-IMP: ABNORMAL
QUANTIFERON MITOGEN: 10 IU/ML
QUANTIFERON NIL TUBE: 0.01 IU/ML
QUANTIFERON TB1 TUBE: 0.01 IU/ML
QUANTIFERON TB2 TUBE: 0.02

## 2024-09-27 LAB
MYELOPEROXIDASE AB SER IA-ACNC: <0.3 U/ML
MYELOPEROXIDASE AB SER IA-ACNC: NEGATIVE
PROTEINASE3 AB SER IA-ACNC: <1 U/ML
PROTEINASE3 AB SER IA-ACNC: NEGATIVE

## 2024-10-03 ENCOUNTER — TELEPHONE (OUTPATIENT)
Dept: RHEUMATOLOGY | Facility: CLINIC | Age: 74
End: 2024-10-03
Payer: COMMERCIAL

## 2024-10-03 DIAGNOSIS — M31.6 GIANT CELL ARTERITIS (H): Primary | ICD-10-CM

## 2024-10-03 NOTE — TELEPHONE ENCOUNTER
[facility] Call Center    Phone Message    May a detailed message be left on voicemail: yes     Reason for Call: Kp was calling about the PET Scan letting us know that they can't cover this test, please call to follow up to review thank you.     Action Taken: Message routed to:  Other:   [facility] RHEUMAT[facility]            Travel Screening: Not Applicable     Date of Service:

## 2024-10-04 NOTE — TELEPHONE ENCOUNTER
Call received from Thea with [employer] again stating that PET scan will not be covered because pt does not meet the diagnostic criteria.     If one of our providers would like to speak to the [facility] physician who completed this review and made this determination, please call [phone].

## 2024-10-04 NOTE — TELEPHONE ENCOUNTER
PET scan denied by insurance. We will proceed with CTA chest, abdomen to evaluate for large vessel vasculitis.     Tomeka Hong MD

## 2024-10-14 ENCOUNTER — TELEPHONE (OUTPATIENT)
Dept: RHEUMATOLOGY | Facility: CLINIC | Age: 74
End: 2024-10-14
Payer: COMMERCIAL

## 2024-10-15 ENCOUNTER — ANCILLARY PROCEDURE (OUTPATIENT)
Dept: ULTRASOUND IMAGING | Facility: CLINIC | Age: 74
End: 2024-10-15
Attending: STUDENT IN AN ORGANIZED HEALTH CARE EDUCATION/TRAINING PROGRAM
Payer: COMMERCIAL

## 2024-10-15 DIAGNOSIS — M31.8 SYSTEMIC VASCULITIS (H): ICD-10-CM

## 2024-10-15 DIAGNOSIS — M31.6 GIANT CELL ARTERITIS (H): ICD-10-CM

## 2024-10-15 PROCEDURE — 93882 EXTRACRANIAL UNI/LTD STUDY: CPT | Performed by: RADIOLOGY

## 2024-10-18 ENCOUNTER — TRANSFERRED RECORDS (OUTPATIENT)
Dept: HEALTH INFORMATION MANAGEMENT | Facility: CLINIC | Age: 74
End: 2024-10-18
Payer: COMMERCIAL

## 2024-10-23 ENCOUNTER — ANCILLARY PROCEDURE (OUTPATIENT)
Dept: CT IMAGING | Facility: CLINIC | Age: 74
End: 2024-10-23
Attending: STUDENT IN AN ORGANIZED HEALTH CARE EDUCATION/TRAINING PROGRAM
Payer: COMMERCIAL

## 2024-10-23 DIAGNOSIS — M31.6 GIANT CELL ARTERITIS (H): ICD-10-CM

## 2024-10-23 PROCEDURE — 74174 CTA ABD&PLVS W/CONTRAST: CPT | Performed by: RADIOLOGY

## 2024-10-23 PROCEDURE — 71275 CT ANGIOGRAPHY CHEST: CPT | Performed by: RADIOLOGY

## 2024-10-23 RX ORDER — IOPAMIDOL 755 MG/ML
125 INJECTION, SOLUTION INTRAVASCULAR ONCE
Status: COMPLETED | OUTPATIENT
Start: 2024-10-23 | End: 2024-10-23

## 2024-10-23 RX ADMIN — IOPAMIDOL 125 ML: 755 INJECTION, SOLUTION INTRAVASCULAR at 15:15

## 2024-10-23 NOTE — DISCHARGE INSTRUCTIONS

## 2024-10-29 ENCOUNTER — OFFICE VISIT (OUTPATIENT)
Dept: RHEUMATOLOGY | Facility: CLINIC | Age: 74
End: 2024-10-29
Attending: STUDENT IN AN ORGANIZED HEALTH CARE EDUCATION/TRAINING PROGRAM
Payer: COMMERCIAL

## 2024-10-29 VITALS
BODY MASS INDEX: 32.03 KG/M2 | OXYGEN SATURATION: 97 % | HEART RATE: 87 BPM | DIASTOLIC BLOOD PRESSURE: 69 MMHG | WEIGHT: 164 LBS | SYSTOLIC BLOOD PRESSURE: 106 MMHG

## 2024-10-29 DIAGNOSIS — I77.89 OTHER SPECIFIED DISORDERS OF ARTERIES AND ARTERIOLES (H): ICD-10-CM

## 2024-10-29 DIAGNOSIS — M31.8 SYSTEMIC VASCULITIS (H): ICD-10-CM

## 2024-10-29 DIAGNOSIS — Q76.49 VERTEBRAL ANOMALY: ICD-10-CM

## 2024-10-29 DIAGNOSIS — M31.6 GCA (GIANT CELL ARTERITIS) (H): ICD-10-CM

## 2024-10-29 DIAGNOSIS — H93.A9 PULSATILE TINNITUS: Primary | ICD-10-CM

## 2024-10-29 DIAGNOSIS — M31.6 GIANT CELL ARTERITIS (H): ICD-10-CM

## 2024-10-29 PROCEDURE — 99215 OFFICE O/P EST HI 40 MIN: CPT | Performed by: STUDENT IN AN ORGANIZED HEALTH CARE EDUCATION/TRAINING PROGRAM

## 2024-10-29 PROCEDURE — G2211 COMPLEX E/M VISIT ADD ON: HCPCS | Performed by: STUDENT IN AN ORGANIZED HEALTH CARE EDUCATION/TRAINING PROGRAM

## 2024-10-29 PROCEDURE — G0463 HOSPITAL OUTPT CLINIC VISIT: HCPCS | Performed by: STUDENT IN AN ORGANIZED HEALTH CARE EDUCATION/TRAINING PROGRAM

## 2024-10-29 RX ORDER — PREDNISONE 10 MG/1
20 TABLET ORAL DAILY
Qty: 120 TABLET | Refills: 0 | Status: SHIPPED | OUTPATIENT
Start: 2024-10-29 | End: 2024-12-28

## 2024-10-29 RX ORDER — SULFAMETHOXAZOLE AND TRIMETHOPRIM 800; 160 MG/1; MG/1
1 TABLET ORAL DAILY
Qty: 60 TABLET | Refills: 0 | Status: SHIPPED | OUTPATIENT
Start: 2024-10-29

## 2024-10-29 ASSESSMENT — ENCOUNTER SYMPTOMS
BLURRED VISION: 1
DOUBLE VISION: 0
WEIGHT LOSS: 0
COUGH: 0
CLAUDICATION: 0
HEADACHES: 0
ABDOMINAL PAIN: 0
PHOTOPHOBIA: 0
BACK PAIN: 1
FEVER: 0

## 2024-10-29 ASSESSMENT — PAIN SCALES - GENERAL: PAINLEVEL_OUTOF10: MODERATE PAIN (4)

## 2024-10-29 NOTE — NURSING NOTE
Chief Complaint   Patient presents with    RECHECK     /69 (BP Location: Left arm, Patient Position: Sitting, Cuff Size: Adult Regular)   Pulse 87   Wt 74.4 kg (164 lb)   SpO2 97%   BMI 32.03 kg/m    Erin ARIAS

## 2024-10-29 NOTE — Clinical Note
Labs as soon as can Imaging as soon as can Follow up in 2 months, vasculitis slot, labs 1 week prior

## 2024-10-29 NOTE — PATIENT INSTRUCTIONS
Our plan for today is:    Get us records from Ophthalmologist     - Tests:    Labs and Imaging studies     - Medications:    Stay on prednisone 20 mg daily   Stay on bactrim

## 2024-10-29 NOTE — LETTER
10/29/2024       RE: Renate Tanner  [address] [id number]     Dear Colleague,    Thank you for referring your patient, Renate Tanner, to the [facility] RHEUMATOLOGY at [facility]. Please see a copy of my visit note below.    RHEUMATOLOGY OUTPATIENT CLINIC NOTE     Referring Provider:   James Correia MD  [address] [place] [id number]    Lab review      ANCA by IFA: Negative  MPO/RI-3: Negative    Temporal artery biopsy:   A.  Left temporal artery, biopsy-Negative for Temporal (Giant Cell) arteritis.  B.  Right temporal artery, biopsy-Negative for Temporal (Giant Cell) arteritis.  Focus of adventitial only chronic inflammation which is considered nonspecific.    Imaging review     Ultrasound of the temporal artery, 10/2024:  No evidence of active vasculitis    CTA chest, abdomen and pelvis with contrast 10/2024:  1. No aortitis or arteritis demonstrated.  2. Thin right vertebral artery.  3. Celiac artery median arcuate ligament anatomy. Correlate for symptoms. 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    Subjective    Visit date October 29, 2024    Renate is a 74 year old female who presents today for follow up. She is here with her daughter Frida     Since the last visit,    Workup on 9/24/2024:    Hemoglobin within normal limits   WBC  within normal limits  and mildly elevated   Platelets  within normal limits   ESR  within normal limits   CRP  within normal limits   AST  within normal limits   Creatinine  within normal limits     Today, Renate mentioned the following:    She is overall doing well.  She has minimal headache, right sided, short lived, resolve spontaneously   She is noticing blurry vision in the left side, she is following with ophthalmologist. She has bilateral macular degeneration and getting injections for this.   She has some forearm pain with activities,  bilaterally. She denies leg claudication  She is having tremors with high dose prednisone     Review of Systems   Constitutional:  Negative for fever, malaise/fatigue and weight loss.   Eyes:  Positive for blurred vision. Negative for double vision and photophobia.   Respiratory:  Negative for cough.    Cardiovascular:  Negative for claudication.   Gastrointestinal:  Negative for abdominal pain.   Musculoskeletal:  Positive for back pain. Negative for joint pain.   Neurological:  Negative for headaches.        Negative for scalp tenderness or jaw claudications        Current Medications   Prednisone 20 mg daily for 3 weeks   Bactrim daily     Past Medical history   Past Medical History:   Diagnosis Date     Arrhythmia      Pacemaker        Objective  /69 (BP Location: Left arm, Patient Position: Sitting, Cuff Size: Adult Regular)   Pulse 87   Wt 74.4 kg (164 lb)   SpO2 97%   BMI 32.03 kg/m        PHYSICAL EXAMINATION  Physical Exam  HENT:      Ears:      Comments: Temporal artery: bilateral palpable without tenderness     Mouth/Throat:      Mouth: Mucous membranes are moist.   Eyes:      Conjunctiva/sclera: Conjunctivae normal.   Cardiovascular:      Heart sounds: Normal heart sounds.      Comments: DP: palpable bilaterally   Pulmonary:      Effort: Pulmonary effort is normal.      Breath sounds: Normal breath sounds.   Musculoskeletal:         General: No swelling or tenderness.      Cervical back: Normal range of motion.   Skin:     Findings: No rash.   Neurological:      Mental Status: She is alert.       Assessment & Plan    # Concern for giant cell arteritis  # New onset cranial symptoms [headache, scalp tenderness] and systemic symptoms  # Temporal artery biopsy reported as negative for arteritis however showing chronic adventitial inflammation  # Elevated inflammatory markers, responded to steroids  # Chronic joint pain  # Screening for large vessel vasculitis  # Screening for chronic infections  #  Longitudinal care    Renate is following with rheumatology for concerns of giant cell arteritis  Onset: 2024  Involvement: Cranial symptoms, systemic symptoms, no visual symptoms  Temporal artery biopsy reported as negative for arteritis however showing chronic adventitial inflammation and focal disruption of elastic lamina  Elevated inflammatory markers with excellent response to steroids  No evidence of LVV on CTA chest, abdomen and pelvis  Temporal artery ultrasound negative for arteritis while steroids    # Concern for giant cell arteritis  # New onset cranial symptoms [headache, scalp tenderness] and systemic symptoms  # Elevated inflammatory markers, responded to steroids  # Temporal artery biopsy reported as negative for arteritis however showing chronic adventitial inflammation and focal disruption of elastic lamina    Renate presented today for follow-up.  She is overall doing well from GCA standpoint.      She has minimal headaches, following with ophthalmology outside, otherwise no symptoms suggestive of active GCA.    She is reporting some muscle pain in the forearm that seems to be activity related, no evidence of active arthritis, unclear if could be upper extremity claudicatory in nature and she is experiencing pulsatile tinnitus more in the left side.    She is having side effects from steroids mainly due to redness    No recent labs for review    Temporal artery ultrasound was negative for arteritis however this was obtained after she has been on steroids.  CTA chest, abdomen and pelvis was negative for aortitis.    We discussed that her presentation remains to be suggestive of giant cell arteritis, her biopsy although reported as negative but has features that could be seen with giant cell arteritis.  We will plan on additional imaging studies to better understand her symptoms.     Plan:  - I will touch base with our colleagues in ophthalmology and pathology regarding the temporal artery biopsy given  reported adventitial inflammation and areas of disruption of elastic lamina.    - Get updated labs including inflammatory markers    - CTA head and neck given pulsatile tinnitus     - Upper extremity arterial Doppler study to rule out large vessel disease affecting the upper limbs    - She will stay on prednisone 20 mg daily for now till further imaging studies and we would likely plan for Actemra as steroid sparing agent pending additional tests.     -  She will remain on bactrim for PJP prophylaxis     Risks and side affects of tocilizumab including increased risk of infections [bacterial, viral, fungal, mycobacterial], increased risk of diverticulitis and perforation, cancer were discussed.    # Screening for large vessel vasculitis  CTA chest, abdomen and pelvis in 10/2024 was negative for large vessel vasculitis in the imaged territories    # Screening for chronic infections  2024  Hepatitis B surface antigen: Negative  Hepatitis B core: Negative  Hepatitis B surface antibody: Negative   Hepatitis C antibody: Negative   QuantiFERON gold: Negative    # Longitudinal care  The longitudinal plan of care for the diagnosis(es)/condition(s) as documented were addressed during this visit. Due to the added complexity in care, I will continue to support Renate in the subsequent management and with ongoing continuity of care.      53 minutes spent by me on the date of the encounter doing chart review, history and exam, documentation and further activities per the note      Return in about 2 months (around 12/29/2024) for Follow up.    Tomeka Hong MD  [facility] RHEUMATOLOGY      Again, thank you for allowing me to participate in the care of your patient.      Sincerely,    Tomeka Hong MD

## 2024-10-29 NOTE — PROGRESS NOTES
RHEUMATOLOGY OUTPATIENT CLINIC NOTE     Referring Provider:   James Correia MD  [address] [place] [address]    Lab review      ANCA by IFA: Negative  MPO/VT-3: Negative    Temporal artery biopsy:   A.  Left temporal artery, biopsy-Negative for Temporal (Giant Cell) arteritis.  B.  Right temporal artery, biopsy-Negative for Temporal (Giant Cell) arteritis.  Focus of adventitial only chronic inflammation which is considered nonspecific.    Imaging review     Ultrasound of the temporal artery, 10/2024:  No evidence of active vasculitis    CTA chest, abdomen and pelvis with contrast 10/2024:  1. No aortitis or arteritis demonstrated.  2. Thin right vertebral artery.  3. Celiac artery median arcuate ligament anatomy. Correlate for symptoms. 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    Subjective     Visit date October 29, 2024    Renate is a 74 year old female who presents today for follow up. She is here with her daughter Frida     Since the last visit,    Workup on 9/24/2024:    Hemoglobin within normal limits   WBC  within normal limits  and mildly elevated   Platelets  within normal limits   ESR  within normal limits   CRP  within normal limits   AST  within normal limits   Creatinine  within normal limits     Today, Renate mentioned the following:    She is overall doing well.  She has minimal headache, right sided, short lived, resolve spontaneously   She is noticing blurry vision in the left side, she is following with ophthalmologist. She has bilateral macular degeneration and getting injections for this.   She has some forearm pain with activities, bilaterally. She denies leg claudication  She is having tremors with high dose prednisone     Review of Systems   Constitutional:  Negative for fever, malaise/fatigue and weight loss.   Eyes:  Positive for blurred vision. Negative for double vision and photophobia.   Respiratory:  Negative for cough.     Cardiovascular:  Negative for claudication.   Gastrointestinal:  Negative for abdominal pain.   Musculoskeletal:  Positive for back pain. Negative for joint pain.   Neurological:  Negative for headaches.        Negative for scalp tenderness or jaw claudications        Current Medications   Prednisone 20 mg daily for 3 weeks   Bactrim daily     Past Medical history   Past Medical History:   Diagnosis Date    Arrhythmia     Pacemaker        Objective   /69 (BP Location: Left arm, Patient Position: Sitting, Cuff Size: Adult Regular)   Pulse 87   Wt 74.4 kg (164 lb)   SpO2 97%   BMI 32.03 kg/m        PHYSICAL EXAMINATION  Physical Exam  HENT:      Ears:      Comments: Temporal artery: bilateral palpable without tenderness     Mouth/Throat:      Mouth: Mucous membranes are moist.   Eyes:      Conjunctiva/sclera: Conjunctivae normal.   Cardiovascular:      Heart sounds: Normal heart sounds.      Comments: DP: palpable bilaterally   Pulmonary:      Effort: Pulmonary effort is normal.      Breath sounds: Normal breath sounds.   Musculoskeletal:         General: No swelling or tenderness.      Cervical back: Normal range of motion.   Skin:     Findings: No rash.   Neurological:      Mental Status: She is alert.       Assessment & Plan     # Concern for giant cell arteritis  # New onset cranial symptoms [headache, scalp tenderness] and systemic symptoms  # Temporal artery biopsy reported as negative for arteritis however showing chronic adventitial inflammation  # Elevated inflammatory markers, responded to steroids  # Chronic joint pain  # Screening for large vessel vasculitis  # Screening for chronic infections  # Longitudinal care    Renate is following with rheumatology for concerns of giant cell arteritis  Onset: 2024  Involvement: Cranial symptoms, systemic symptoms, no visual symptoms  Temporal artery biopsy reported as negative for arteritis however showing chronic adventitial inflammation and focal  disruption of elastic lamina  Elevated inflammatory markers with excellent response to steroids  No evidence of LVV on CTA chest, abdomen and pelvis  Temporal artery ultrasound negative for arteritis while steroids    # Concern for giant cell arteritis  # New onset cranial symptoms [headache, scalp tenderness] and systemic symptoms  # Elevated inflammatory markers, responded to steroids  # Temporal artery biopsy reported as negative for arteritis however showing chronic adventitial inflammation and focal disruption of elastic lamina    Renate presented today for follow-up.  She is overall doing well from GCA standpoint.      She has minimal headaches, following with ophthalmology outside, otherwise no symptoms suggestive of active GCA.    She is reporting some muscle pain in the forearm that seems to be activity related, no evidence of active arthritis, unclear if could be upper extremity claudicatory in nature and she is experiencing pulsatile tinnitus more in the left side.    She is having side effects from steroids mainly due to redness    No recent labs for review    Temporal artery ultrasound was negative for arteritis however this was obtained after she has been on steroids.  CTA chest, abdomen and pelvis was negative for aortitis.    We discussed that her presentation remains to be suggestive of giant cell arteritis, her biopsy although reported as negative but has features that could be seen with giant cell arteritis.  We will plan on additional imaging studies to better understand her symptoms.     Plan:  - I will touch base with our colleagues in ophthalmology and pathology regarding the temporal artery biopsy given reported adventitial inflammation and areas of disruption of elastic lamina.    - Get updated labs including inflammatory markers    - CTA head and neck given pulsatile tinnitus     - Upper extremity arterial Doppler study to rule out large vessel disease affecting the upper limbs    - She will  stay on prednisone 20 mg daily for now till further imaging studies and we would likely plan for Actemra as steroid sparing agent pending additional tests.     -  She will remain on bactrim for PJP prophylaxis     Risks and side affects of tocilizumab including increased risk of infections [bacterial, viral, fungal, mycobacterial], increased risk of diverticulitis and perforation, cancer were discussed.    # Screening for large vessel vasculitis  CTA chest, abdomen and pelvis in 10/2024 was negative for large vessel vasculitis in the imaged territories    # Screening for chronic infections  2024  Hepatitis B surface antigen: Negative  Hepatitis B core: Negative  Hepatitis B surface antibody: Negative   Hepatitis C antibody: Negative   QuantiFERON gold: Negative    # Longitudinal care  The longitudinal plan of care for the diagnosis(es)/condition(s) as documented were addressed during this visit. Due to the added complexity in care, I will continue to support Renate in the subsequent management and with ongoing continuity of care.      53 minutes spent by me on the date of the encounter doing chart review, history and exam, documentation and further activities per the note      Return in about 2 months (around 12/29/2024) for Follow up.    Tomeka Hong MD  formerly [facility] RHEUMATOLOGY

## 2024-11-07 ENCOUNTER — LAB (OUTPATIENT)
Dept: LAB | Facility: HOSPITAL | Age: 74
End: 2024-11-07
Attending: STUDENT IN AN ORGANIZED HEALTH CARE EDUCATION/TRAINING PROGRAM
Payer: COMMERCIAL

## 2024-11-07 ENCOUNTER — HOSPITAL ENCOUNTER (OUTPATIENT)
Dept: CT IMAGING | Facility: HOSPITAL | Age: 74
Discharge: HOME | End: 2024-11-07
Attending: STUDENT IN AN ORGANIZED HEALTH CARE EDUCATION/TRAINING PROGRAM
Payer: COMMERCIAL

## 2024-11-07 ENCOUNTER — HOSPITAL ENCOUNTER (OUTPATIENT)
Dept: ULTRASOUND IMAGING | Facility: HOSPITAL | Age: 74
Discharge: HOME | End: 2024-11-07
Attending: STUDENT IN AN ORGANIZED HEALTH CARE EDUCATION/TRAINING PROGRAM
Payer: COMMERCIAL

## 2024-11-07 DIAGNOSIS — M31.6 GCA (GIANT CELL ARTERITIS) (H): ICD-10-CM

## 2024-11-07 DIAGNOSIS — H93.A9 PULSATILE TINNITUS: ICD-10-CM

## 2024-11-07 DIAGNOSIS — M31.8 SYSTEMIC VASCULITIS (H): ICD-10-CM

## 2024-11-07 DIAGNOSIS — I77.89 OTHER SPECIFIED DISORDERS OF ARTERIES AND ARTERIOLES (H): ICD-10-CM

## 2024-11-07 DIAGNOSIS — Q76.49 VERTEBRAL ANOMALY: ICD-10-CM

## 2024-11-07 LAB
AST SERPL W P-5'-P-CCNC: 30 U/L (ref 0–45)
BASOPHILS # BLD AUTO: 0.1 10E3/UL (ref 0–0.2)
BASOPHILS NFR BLD AUTO: 1 %
CREAT BLD-MCNC: 1.2 MG/DL (ref 0.6–1.1)
CREAT SERPL-MCNC: 0.96 MG/DL (ref 0.51–0.95)
CRP SERPL-MCNC: <3 MG/L
EGFRCR SERPLBLD CKD-EPI 2021: 47 ML/MIN/1.73M2
EGFRCR SERPLBLD CKD-EPI 2021: 61 ML/MIN/1.73M2
EOSINOPHIL # BLD AUTO: 0 10E3/UL (ref 0–0.7)
EOSINOPHIL NFR BLD AUTO: 0 %
ERYTHROCYTE [DISTWIDTH] IN BLOOD BY AUTOMATED COUNT: 16.1 % (ref 10–15)
ERYTHROCYTE [SEDIMENTATION RATE] IN BLOOD BY WESTERGREN METHOD: 6 MM/HR (ref 0–30)
HCT VFR BLD AUTO: 40.9 % (ref 35–47)
HGB BLD-MCNC: 14 G/DL (ref 11.7–15.7)
IMM GRANULOCYTES # BLD: 0.2 10E3/UL
IMM GRANULOCYTES NFR BLD: 2 %
LYMPHOCYTES # BLD AUTO: 1.5 10E3/UL (ref 0.8–5.3)
LYMPHOCYTES NFR BLD AUTO: 15 %
MCH RBC QN AUTO: 33.5 PG (ref 26.5–33)
MCHC RBC AUTO-ENTMCNC: 34.2 G/DL (ref 31.5–36.5)
MCV RBC AUTO: 98 FL (ref 78–100)
MONOCYTES # BLD AUTO: 0.7 10E3/UL (ref 0–1.3)
MONOCYTES NFR BLD AUTO: 7 %
NEUTROPHILS # BLD AUTO: 7.2 10E3/UL (ref 1.6–8.3)
NEUTROPHILS NFR BLD AUTO: 75 %
NRBC # BLD AUTO: 0 10E3/UL
NRBC BLD AUTO-RTO: 0 /100
PLATELET # BLD AUTO: 233 10E3/UL (ref 150–450)
RBC # BLD AUTO: 4.18 10E6/UL (ref 3.8–5.2)
WBC # BLD AUTO: 9.7 10E3/UL (ref 4–11)

## 2024-11-07 PROCEDURE — 85004 AUTOMATED DIFF WBC COUNT: CPT

## 2024-11-07 PROCEDURE — 36415 COLL VENOUS BLD VENIPUNCTURE: CPT

## 2024-11-07 PROCEDURE — 82565 ASSAY OF CREATININE: CPT

## 2024-11-07 PROCEDURE — 70496 CT ANGIOGRAPHY HEAD: CPT

## 2024-11-07 PROCEDURE — 250N000011 HC RX IP 250 OP 636: Performed by: STUDENT IN AN ORGANIZED HEALTH CARE EDUCATION/TRAINING PROGRAM

## 2024-11-07 PROCEDURE — 85652 RBC SED RATE AUTOMATED: CPT

## 2024-11-07 PROCEDURE — 84450 TRANSFERASE (AST) (SGOT): CPT

## 2024-11-07 PROCEDURE — 82784 ASSAY IGA/IGD/IGG/IGM EACH: CPT

## 2024-11-07 PROCEDURE — 86140 C-REACTIVE PROTEIN: CPT

## 2024-11-07 PROCEDURE — 93930 UPPER EXTREMITY STUDY: CPT

## 2024-11-07 RX ORDER — IOPAMIDOL 755 MG/ML
67 INJECTION, SOLUTION INTRAVASCULAR ONCE
Status: COMPLETED | OUTPATIENT
Start: 2024-11-07 | End: 2024-11-07

## 2024-11-07 RX ADMIN — IOPAMIDOL 67 ML: 755 INJECTION, SOLUTION INTRAVENOUS at 13:22

## 2024-11-08 LAB
IGA SERPL-MCNC: 213 MG/DL (ref 84–499)
IGG SERPL-MCNC: 528 MG/DL (ref 610–1616)
IGM SERPL-MCNC: 47 MG/DL (ref 35–242)

## 2024-11-14 ENCOUNTER — DOCUMENTATION ONLY (OUTPATIENT)
Dept: RHEUMATOLOGY | Facility: CLINIC | Age: 74
End: 2024-11-14
Payer: COMMERCIAL

## 2024-11-14 DIAGNOSIS — M31.6 GCA (GIANT CELL ARTERITIS) (H): Primary | ICD-10-CM

## 2024-11-14 DIAGNOSIS — M47.22 OSTEOARTHRITIS OF SPINE WITH RADICULOPATHY, CERVICAL REGION: ICD-10-CM

## 2024-11-14 RX ORDER — SULFAMETHOXAZOLE AND TRIMETHOPRIM 400; 80 MG/1; MG/1
1 TABLET ORAL DAILY
Qty: 14 TABLET | Refills: 0 | Status: SHIPPED | OUTPATIENT
Start: 2024-11-14 | End: 2024-11-28

## 2024-11-14 RX ORDER — PREDNISONE 5 MG/1
TABLET ORAL
Qty: 182 TABLET | Refills: 0 | Status: SHIPPED | OUTPATIENT
Start: 2024-11-14 | End: 2025-01-23

## 2024-11-14 NOTE — PROGRESS NOTES
Results reviewed and patient contacted over the phone.  Doppler ultrasound of the upper extremities was unremarkable  CTA of the head and neck showed extensive cervical spondylosis, likely the cause of neck pain that is radiating to the arms.  There was no evidence of arteritis, there are areas of stranding in along the temporal arteries which could be sequelae of prior biopsy.  Inflammatory markers are normal, serum IgG slightly low secondary to prednisone therapy.    Pathology reviewed the temporal artery biopsy and still remains as nonspecific and no evidence of arteritis.    Therefore I discussed with Renate the results of her test.  We have an explanation for her shoulder, neck and arm pain which is secondary to cervical spondylosis however this does not explain her other symptoms which are suggestive of giant cell arteritis.    Therefore we discussed proceeding with spine center evaluation for cervical spondylosis as well as slow prednisone taper for suspected giant cell arteritis.  If there are any signs of flare or recurrence of symptoms that is consistent with giant cell arteritis then addition of tocilizumab as steroid sparing agent will be pursued.    She is in agreement with this plan and seems comfortable with this approach.    Plan:    Spine referral for cervical spine     Monthly inflammation marker check     Continue bactrim for 2 weeks then stop     Please start prednisone and taper it according to this schedule:  Take prednisone 17.5 mg daily for 2 week  Take prednisone 15 mg daily for 2 week  Then prednisone 12.5 mg daily for 2 week  Then prednisone 10 mg daily for 4 weeks    If you experience any symptoms suggestive of flare including (recurrence of headache, scalp tenderness, jaw claudications, arm/leg claudications, unexplained fever, weight loss) while tapering the prednisone, then we would suggest not to taper prednisone further and increase prednisone back to the previous dose that was  controlling your symptoms and reach out to Rheumatology office       Tomeka Hong MD

## 2024-11-15 ENCOUNTER — PATIENT OUTREACH (OUTPATIENT)
Dept: CARE COORDINATION | Facility: CLINIC | Age: 74
End: 2024-11-15
Payer: COMMERCIAL

## 2024-11-18 ENCOUNTER — TELEPHONE (OUTPATIENT)
Dept: NEUROSURGERY | Facility: CLINIC | Age: 74
End: 2024-11-18
Payer: COMMERCIAL

## 2024-11-18 ENCOUNTER — PATIENT OUTREACH (OUTPATIENT)
Dept: CARE COORDINATION | Facility: CLINIC | Age: 74
End: 2024-11-18
Payer: COMMERCIAL

## 2024-11-18 NOTE — TELEPHONE ENCOUNTER
Please see pt's upcoming appt with MD Angeli and also pt's imaging (ON FILE).    Does pt need a current MRI?    Spine Tree done.    Please CALL pt to discuss. Thank you.

## 2024-11-19 NOTE — TELEPHONE ENCOUNTER
Writer returned call to patient and assisted with rescheduling her with an LUCÍA in the neurosurgery department. She has been rescheduled with Brittaney Hernandes CNP on 11/26 at the [facility].      ARASH BautistaN RN Care Coordinator  Neurology/Neurosurgery/PM&R/Pain Management

## 2024-11-19 NOTE — TELEPHONE ENCOUNTER
RECORDS RECEIVED FROM: Internal    REASON FOR VISIT: Osteoarthritis of spine with radiculopathy, cervical region   PROVIDER: Brittaney Hernandes APRN CNP   DATE OF APPT: 11/26/24 @ 11: 00 am    NOTES (FOR ALL VISITS) STATUS DETAILS   OFFICE NOTE from referring provider Internal 11/14/24 (My Chart Note), 10/29/24 Tomeka Hong MD @[facility] Rheumatology     MEDICATION LIST Internal    IMAGING  (FOR ALL VISITS)     CT (HEAD, NECK, SPINE) Internal [facility]  11/7/24 CTA Head Neck  10/23/24 CTA Chest Abdomen  8/23/24 CT Head

## 2024-11-19 NOTE — TELEPHONE ENCOUNTER
Patient was called by clinic scheduling team to reschedule with LUCÍA- Patient states she has a pacemaker and will not be having an MRI.    Please advise if patient can be seen with Dr. Suh at this point.    Calm

## 2024-11-19 NOTE — TELEPHONE ENCOUNTER
REASON FOR VISIT: Cervical    DATE OF APPT: 12/05/2024   NOTES (FOR ALL VISITS) STATUS DETAILS   OFFICE NOTE from referring provider Internal [facility]  Tomeka Hong MD 11/14/2024   MEDICATION LIST Internal    IMAGING  (FOR ALL VISITS)     XR N/A    MRI (HEAD, NECK, SPINE) N/A    CT (HEAD, NECK, SPINE) N/A

## 2024-11-25 ENCOUNTER — TELEPHONE (OUTPATIENT)
Dept: NEUROSURGERY | Facility: CLINIC | Age: 74
End: 2024-11-25
Payer: COMMERCIAL

## 2024-11-25 NOTE — TELEPHONE ENCOUNTER
Called patient and rescheduled appointment from Brittaney Hernandes to Suzanne Jones via task. -KB

## 2024-11-26 ENCOUNTER — PRE VISIT (OUTPATIENT)
Dept: NEUROSURGERY | Facility: CLINIC | Age: 74
End: 2024-11-26

## 2024-12-05 ENCOUNTER — PRE VISIT (OUTPATIENT)
Dept: NEUROSURGERY | Facility: CLINIC | Age: 74
End: 2024-12-05

## 2024-12-11 ENCOUNTER — LAB (OUTPATIENT)
Dept: LAB | Facility: CLINIC | Age: 74
End: 2024-12-11
Attending: STUDENT IN AN ORGANIZED HEALTH CARE EDUCATION/TRAINING PROGRAM
Payer: COMMERCIAL

## 2024-12-11 DIAGNOSIS — M31.8 SYSTEMIC VASCULITIS (H): ICD-10-CM

## 2024-12-11 DIAGNOSIS — M31.6 GCA (GIANT CELL ARTERITIS) (H): ICD-10-CM

## 2024-12-11 LAB
AST SERPL W P-5'-P-CCNC: 31 U/L (ref 0–45)
BASOPHILS # BLD AUTO: 0 10E3/UL (ref 0–0.2)
BASOPHILS NFR BLD AUTO: 0 %
CREAT SERPL-MCNC: 1.2 MG/DL (ref 0.51–0.95)
CRP SERPL-MCNC: <3 MG/L
EGFRCR SERPLBLD CKD-EPI 2021: 47 ML/MIN/1.73M2
EOSINOPHIL # BLD AUTO: 0 10E3/UL (ref 0–0.7)
EOSINOPHIL NFR BLD AUTO: 0 %
ERYTHROCYTE [DISTWIDTH] IN BLOOD BY AUTOMATED COUNT: 14.7 % (ref 10–15)
ERYTHROCYTE [SEDIMENTATION RATE] IN BLOOD BY WESTERGREN METHOD: 11 MM/HR (ref 0–30)
HCT VFR BLD AUTO: 39.8 % (ref 35–47)
HGB BLD-MCNC: 13.3 G/DL (ref 11.7–15.7)
IMM GRANULOCYTES # BLD: 0 10E3/UL
IMM GRANULOCYTES NFR BLD: 1 %
LYMPHOCYTES # BLD AUTO: 0.9 10E3/UL (ref 0.8–5.3)
LYMPHOCYTES NFR BLD AUTO: 12 %
MCH RBC QN AUTO: 34.1 PG (ref 26.5–33)
MCHC RBC AUTO-ENTMCNC: 33.4 G/DL (ref 31.5–36.5)
MCV RBC AUTO: 102 FL (ref 78–100)
MONOCYTES # BLD AUTO: 0.5 10E3/UL (ref 0–1.3)
MONOCYTES NFR BLD AUTO: 6 %
NEUTROPHILS # BLD AUTO: 6.3 10E3/UL (ref 1.6–8.3)
NEUTROPHILS NFR BLD AUTO: 81 %
PLATELET # BLD AUTO: 214 10E3/UL (ref 150–450)
RBC # BLD AUTO: 3.9 10E6/UL (ref 3.8–5.2)
WBC # BLD AUTO: 7.8 10E3/UL (ref 4–11)

## 2024-12-11 PROCEDURE — 82565 ASSAY OF CREATININE: CPT

## 2024-12-11 PROCEDURE — 85025 COMPLETE CBC W/AUTO DIFF WBC: CPT

## 2024-12-11 PROCEDURE — 85652 RBC SED RATE AUTOMATED: CPT

## 2024-12-11 PROCEDURE — 86140 C-REACTIVE PROTEIN: CPT

## 2024-12-11 PROCEDURE — 84450 TRANSFERASE (AST) (SGOT): CPT

## 2024-12-11 PROCEDURE — 36415 COLL VENOUS BLD VENIPUNCTURE: CPT

## 2024-12-12 DIAGNOSIS — M54.2 NECK PAIN: Primary | ICD-10-CM

## 2024-12-12 DIAGNOSIS — N17.9 AKI (ACUTE KIDNEY INJURY) (H): Primary | ICD-10-CM

## 2025-01-04 ENCOUNTER — LAB (OUTPATIENT)
Dept: LAB | Facility: CLINIC | Age: 75
End: 2025-01-04
Payer: COMMERCIAL

## 2025-01-04 DIAGNOSIS — N17.9 AKI (ACUTE KIDNEY INJURY): ICD-10-CM

## 2025-01-04 DIAGNOSIS — M31.6 GCA (GIANT CELL ARTERITIS) (H): ICD-10-CM

## 2025-01-04 LAB
CREAT SERPL-MCNC: 0.72 MG/DL (ref 0.51–0.95)
CRP SERPL-MCNC: <3 MG/L
EGFRCR SERPLBLD CKD-EPI 2021: 86 ML/MIN/1.73M2
ERYTHROCYTE [SEDIMENTATION RATE] IN BLOOD BY WESTERGREN METHOD: 6 MM/HR (ref 0–30)

## 2025-01-04 PROCEDURE — 36415 COLL VENOUS BLD VENIPUNCTURE: CPT

## 2025-01-04 PROCEDURE — 85652 RBC SED RATE AUTOMATED: CPT

## 2025-01-04 PROCEDURE — 82565 ASSAY OF CREATININE: CPT

## 2025-01-04 PROCEDURE — 86140 C-REACTIVE PROTEIN: CPT

## 2025-01-07 ENCOUNTER — OFFICE VISIT (OUTPATIENT)
Dept: RHEUMATOLOGY | Facility: CLINIC | Age: 75
End: 2025-01-07
Attending: STUDENT IN AN ORGANIZED HEALTH CARE EDUCATION/TRAINING PROGRAM
Payer: COMMERCIAL

## 2025-01-07 VITALS
OXYGEN SATURATION: 97 % | HEART RATE: 80 BPM | WEIGHT: 164 LBS | DIASTOLIC BLOOD PRESSURE: 74 MMHG | BODY MASS INDEX: 32.03 KG/M2 | SYSTOLIC BLOOD PRESSURE: 112 MMHG

## 2025-01-07 DIAGNOSIS — D75.89 MACROCYTOSIS: Primary | ICD-10-CM

## 2025-01-07 DIAGNOSIS — M31.6 GCA (GIANT CELL ARTERITIS) (H): ICD-10-CM

## 2025-01-07 PROCEDURE — G0463 HOSPITAL OUTPT CLINIC VISIT: HCPCS | Performed by: STUDENT IN AN ORGANIZED HEALTH CARE EDUCATION/TRAINING PROGRAM

## 2025-01-07 PROCEDURE — 99215 OFFICE O/P EST HI 40 MIN: CPT | Performed by: STUDENT IN AN ORGANIZED HEALTH CARE EDUCATION/TRAINING PROGRAM

## 2025-01-07 PROCEDURE — G2211 COMPLEX E/M VISIT ADD ON: HCPCS | Performed by: STUDENT IN AN ORGANIZED HEALTH CARE EDUCATION/TRAINING PROGRAM

## 2025-01-07 RX ORDER — PREDNISONE 1 MG/1
TABLET ORAL
Qty: 200 TABLET | Refills: 2 | Status: SHIPPED | OUTPATIENT
Start: 2025-01-07

## 2025-01-07 RX ORDER — PREDNISONE 5 MG/1
TABLET ORAL
Qty: 150 TABLET | Refills: 1 | Status: SHIPPED | OUTPATIENT
Start: 2025-01-07

## 2025-01-07 ASSESSMENT — ENCOUNTER SYMPTOMS
ABDOMINAL PAIN: 0
BLURRED VISION: 1
DOUBLE VISION: 0
HEADACHES: 0
WEIGHT LOSS: 0
FEVER: 0
PHOTOPHOBIA: 0
COUGH: 0
BACK PAIN: 1
CLAUDICATION: 0

## 2025-01-07 ASSESSMENT — PAIN SCALES - GENERAL: PAINLEVEL_OUTOF10: MODERATE PAIN (4)

## 2025-01-07 NOTE — LETTER
1/7/2025       RE: Renate Tanner  [address] [id number]     Dear Colleague,    Thank you for referring your patient, Renate Tanner, to the [facility] RHEUMATOLOGY at [facility]. Please see a copy of my visit note below.    RHEUMATOLOGY OUTPATIENT CLINIC NOTE     Referring Provider:   James Correia MD  [address] [place] [id number]    Lab review      ANCA by IFA: Negative  MPO/HI-3: Negative    Temporal artery biopsy:   A.  Left temporal artery, biopsy-Negative for Temporal (Giant Cell) arteritis.  B.  Right temporal artery, biopsy-Negative for Temporal (Giant Cell) arteritis.  Focus of adventitial only chronic inflammation which is considered nonspecific.    Imaging review     Ultrasound of the temporal artery, 10/2024:  No evidence of active vasculitis    CTA chest, abdomen and pelvis with contrast 10/2024:  1. No aortitis or arteritis demonstrated.  2. Thin right vertebral artery.  3. Celiac artery median arcuate ligament anatomy. Correlate for symptoms. 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    Subjective    Visit date January 7, 2025    Renate is a 74 year old female who presents today for follow up.   Her daughter Frida was available over the phone.     Since the last visit,    Workup on 1/4/2025:  ESR  within normal limits   CRP  within normal limits   Creatinine  within normal limits     12/11  Hemoglobin within normal limits   WBC  within normal limits   Platelets  within normal limits         Today, Renate mentioned the following:    She is overall doing well.  She denies headache  She remains with blurry vision in the left side, she is following with ophthalmologist. She has bilateral macular degeneration and was diagnosed with cataract.  She has occasional pain in forearm pain with activities, bilaterally. She was evaluated by neurosurgery and she was not felt to  have radiculopathy or myelopathy    Review of Systems   Constitutional:  Negative for fever, malaise/fatigue and weight loss.   Eyes:  Positive for blurred vision. Negative for double vision and photophobia.   Respiratory:  Negative for cough.    Cardiovascular:  Negative for claudication.   Gastrointestinal:  Negative for abdominal pain.   Musculoskeletal:  Positive for back pain. Negative for joint pain.   Neurological:  Negative for headaches.        Negative for scalp tenderness or jaw claudications        Current Medications   Prednisone 10 mg daily for 1.5 weeks   Bactrim daily, currently not taking     Past Medical history   Past Medical History:   Diagnosis Date     Arrhythmia      Pacemaker        Objective  /74 (BP Location: Right arm, Patient Position: Sitting, Cuff Size: Adult Regular)   Pulse 80   Wt 74.4 kg (164 lb)   SpO2 97%   BMI 32.03 kg/m        PHYSICAL EXAMINATION  Physical Exam  HENT:      Ears:      Comments: Temporal artery: bilateral palpable without tenderness     Mouth/Throat:      Mouth: Mucous membranes are moist.   Eyes:      Conjunctiva/sclera: Conjunctivae normal.   Pulmonary:      Effort: Pulmonary effort is normal.      Breath sounds: Normal breath sounds.   Musculoskeletal:         General: No swelling or tenderness.      Cervical back: Normal range of motion.   Skin:     Findings: No rash.   Neurological:      Mental Status: She is alert.       Assessment & Plan    # Concern for giant cell arteritis  # New onset cranial symptoms [headache, scalp tenderness] and systemic symptoms  # Temporal artery biopsy reported as negative for arteritis however showing chronic adventitial inflammation  # Elevated inflammatory markers, responded to steroids  # Chronic joint pain    # Screening for large vessel vasculitis  # Screening for chronic infections  # Longitudinal care    Renate is following with rheumatology for concerns of giant cell arteritis  Onset: 2024  Involvement: Cranial  symptoms, systemic symptoms, no visual symptoms  Temporal artery biopsy reported as negative for arteritis however showing chronic adventitial inflammation and focal disruption of elastic lamina  Elevated inflammatory markers with excellent response to steroids  No evidence of LVV on CTA chest, abdomen and pelvis  Temporal artery ultrasound negative for arteritis while steroids    # Concern for giant cell arteritis  # New onset cranial symptoms [headache, scalp tenderness] and systemic symptoms  # Elevated inflammatory markers, responded to steroids  # Temporal artery biopsy reported as negative for arteritis however showing chronic adventitial inflammation and focal disruption of elastic lamina    Renate presented today for follow-up.She is overall doing well from GCA standpoint.    She denies symptoms suggestive of active GCA and she is having some side effects from prednisone therefore we discussed accelerated prednisone taper  She has slightly elevated creatinine in November that normalized in December, could have been from dehydration or Bactrim use.  Her biopsy was reviewed again by pathology and felt to be nonspecific    We discussed that her presentation remains to be suggestive of giant cell arteritis and we will continue with prednisone taper however in an accelerated scale given side effects from prednisone and we will continue to watch for symptoms suggestive of recurrence/flare of the underlying disease.      Plan:    - Start prednisone taper as below    - Continue checking inflammatory markers [ESR and CRP] on monthly basis in the first week of the month      Please start prednisone and taper it according to this schedule:  Complete prednisone 10 mg daily for 2 weeks then   Take prednisone 9 mg daily for 1 week  Then prednisone 8 mg daily for 1 week  Then prednisone 7 mg daily for 1 week    Then prednisone 6 mg daily for 2 weeks  Then prednisone 5 mg daily for 2 weeks  Then prednisone 4 mg daily for 2  weeks  Then prednisone 3 mg daily for 2 weeks  Then prednisone 2 mg daily for 2 weeks  Then prednisone 1 mg daily for 2 weeks then stop      If you experience any symptoms suggestive of flare including (recurrence of headache, scalp tenderness, jaw claudications, arm/leg claudications, unexplained fever, weight loss) while tapering the prednisone, then we would suggest not to taper prednisone further and increase prednisone back to the previous dose that was controlling your symptoms and reach out to Rheumatology office     # Screening for large vessel vasculitis  CTA chest, abdomen and pelvis in 10/2024 was negative for large vessel vasculitis in the imaged territories    # Screening for chronic infections  2024  Hepatitis B surface antigen: Negative  Hepatitis B core: Negative  Hepatitis B surface antibody: Negative   Hepatitis C antibody: Negative   QuantiFERON gold: Negative    # Longitudinal care  The longitudinal plan of care for the diagnosis(es)/condition(s) as documented were addressed during this visit. Due to the added complexity in care, I will continue to support Renate in the subsequent management and with ongoing continuity of care.    41 minutes spent by me on the date of the encounter doing chart review, history and exam, documentation and further activities per the note      Return in about 3 months (around 4/7/2025).    Tomeka Hong MD  [facility] RHEUMATOLOGY      Again, thank you for allowing me to participate in the care of your patient.      Sincerely,    Tomeka Hong MD

## 2025-01-07 NOTE — PROGRESS NOTES
RHEUMATOLOGY OUTPATIENT CLINIC NOTE     Referring Provider:   James Correia MD  [address] [place] [address]    Lab review      ANCA by IFA: Negative  MPO/SD-3: Negative    Temporal artery biopsy:   A.  Left temporal artery, biopsy-Negative for Temporal (Giant Cell) arteritis.  B.  Right temporal artery, biopsy-Negative for Temporal (Giant Cell) arteritis.  Focus of adventitial only chronic inflammation which is considered nonspecific.    Imaging review     Ultrasound of the temporal artery, 10/2024:  No evidence of active vasculitis    CTA chest, abdomen and pelvis with contrast 10/2024:  1. No aortitis or arteritis demonstrated.  2. Thin right vertebral artery.  3. Celiac artery median arcuate ligament anatomy. Correlate for symptoms. 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    Subjective     Visit date January 7, 2025    Renate is a 74 year old female who presents today for follow up.   Her daughter Frida was available over the phone.     Since the last visit,    Workup on 1/4/2025:  ESR  within normal limits   CRP  within normal limits   Creatinine  within normal limits     12/11  Hemoglobin within normal limits   WBC  within normal limits   Platelets  within normal limits         Today, Renate mentioned the following:    She is overall doing well.  She denies headache  She remains with blurry vision in the left side, she is following with ophthalmologist. She has bilateral macular degeneration and was diagnosed with cataract.  She has occasional pain in forearm pain with activities, bilaterally. She was evaluated by neurosurgery and she was not felt to have radiculopathy or myelopathy    Review of Systems   Constitutional:  Negative for fever, malaise/fatigue and weight loss.   Eyes:  Positive for blurred vision. Negative for double vision and photophobia.   Respiratory:  Negative for cough.    Cardiovascular:  Negative for claudication.    Gastrointestinal:  Negative for abdominal pain.   Musculoskeletal:  Positive for back pain. Negative for joint pain.   Neurological:  Negative for headaches.        Negative for scalp tenderness or jaw claudications        Current Medications   Prednisone 10 mg daily for 1.5 weeks   Bactrim daily, currently not taking     Past Medical history   Past Medical History:   Diagnosis Date    Arrhythmia     Pacemaker        Objective   /74 (BP Location: Right arm, Patient Position: Sitting, Cuff Size: Adult Regular)   Pulse 80   Wt 74.4 kg (164 lb)   SpO2 97%   BMI 32.03 kg/m        PHYSICAL EXAMINATION  Physical Exam  HENT:      Ears:      Comments: Temporal artery: bilateral palpable without tenderness     Mouth/Throat:      Mouth: Mucous membranes are moist.   Eyes:      Conjunctiva/sclera: Conjunctivae normal.   Pulmonary:      Effort: Pulmonary effort is normal.      Breath sounds: Normal breath sounds.   Musculoskeletal:         General: No swelling or tenderness.      Cervical back: Normal range of motion.   Skin:     Findings: No rash.   Neurological:      Mental Status: She is alert.       Assessment & Plan     # Concern for giant cell arteritis  # New onset cranial symptoms [headache, scalp tenderness] and systemic symptoms  # Temporal artery biopsy reported as negative for arteritis however showing chronic adventitial inflammation  # Elevated inflammatory markers, responded to steroids  # Chronic joint pain    # Screening for large vessel vasculitis  # Screening for chronic infections  # Longitudinal care    Renate is following with rheumatology for concerns of giant cell arteritis  Onset: 2024  Involvement: Cranial symptoms, systemic symptoms, no visual symptoms  Temporal artery biopsy reported as negative for arteritis however showing chronic adventitial inflammation and focal disruption of elastic lamina  Elevated inflammatory markers with excellent response to steroids  No evidence of LVV on CTA  chest, abdomen and pelvis  Temporal artery ultrasound negative for arteritis while steroids    # Concern for giant cell arteritis  # New onset cranial symptoms [headache, scalp tenderness] and systemic symptoms  # Elevated inflammatory markers, responded to steroids  # Temporal artery biopsy reported as negative for arteritis however showing chronic adventitial inflammation and focal disruption of elastic lamina    Renate presented today for follow-up.She is overall doing well from GCA standpoint.    She denies symptoms suggestive of active GCA and she is having some side effects from prednisone therefore we discussed accelerated prednisone taper  She has slightly elevated creatinine in November that normalized in December, could have been from dehydration or Bactrim use.  Her biopsy was reviewed again by pathology and felt to be nonspecific    We discussed that her presentation remains to be suggestive of giant cell arteritis and we will continue with prednisone taper however in an accelerated scale given side effects from prednisone and we will continue to watch for symptoms suggestive of recurrence/flare of the underlying disease.      Plan:    - Start prednisone taper as below    - Continue checking inflammatory markers [ESR and CRP] on monthly basis in the first week of the month      Please start prednisone and taper it according to this schedule:  Complete prednisone 10 mg daily for 2 weeks then   Take prednisone 9 mg daily for 1 week  Then prednisone 8 mg daily for 1 week  Then prednisone 7 mg daily for 1 week    Then prednisone 6 mg daily for 2 weeks  Then prednisone 5 mg daily for 2 weeks  Then prednisone 4 mg daily for 2 weeks  Then prednisone 3 mg daily for 2 weeks  Then prednisone 2 mg daily for 2 weeks  Then prednisone 1 mg daily for 2 weeks then stop      If you experience any symptoms suggestive of flare including (recurrence of headache, scalp tenderness, jaw claudications, arm/leg claudications,  unexplained fever, weight loss) while tapering the prednisone, then we would suggest not to taper prednisone further and increase prednisone back to the previous dose that was controlling your symptoms and reach out to Rheumatology office     # Screening for large vessel vasculitis  CTA chest, abdomen and pelvis in 10/2024 was negative for large vessel vasculitis in the imaged territories    # Screening for chronic infections  2024  Hepatitis B surface antigen: Negative  Hepatitis B core: Negative  Hepatitis B surface antibody: Negative   Hepatitis C antibody: Negative   QuantiFERON gold: Negative    # Longitudinal care  The longitudinal plan of care for the diagnosis(es)/condition(s) as documented were addressed during this visit. Due to the added complexity in care, I will continue to support Renate in the subsequent management and with ongoing continuity of care.    41 minutes spent by me on the date of the encounter doing chart review, history and exam, documentation and further activities per the note      Return in about 3 months (around 4/7/2025).    Tomeka Hong MD  formerly [facility] RHEUMATOLOGY

## 2025-01-07 NOTE — NURSING NOTE
Chief Complaint   Patient presents with    RECHECK     /74 (BP Location: Right arm, Patient Position: Sitting, Cuff Size: Adult Regular)   Pulse 80   Wt 74.4 kg (164 lb)   SpO2 97%   BMI 32.03 kg/m

## 2025-01-07 NOTE — PATIENT INSTRUCTIONS
Our plan for today is:    - Tests:    Labs (inflammation marker ESR, CRP monthly in the first week of the month)    Full labs 1 week before the next appointment     - Medications:     Complete prednisone 10 mg daily for 2 weeks then   Take prednisone 9 mg daily for 1 week  Then prednisone 8 mg daily for 1 week  Then prednisone 7 mg daily for 1 week    Then prednisone 6 mg daily for 2 weeks  Then prednisone 5 mg daily for 2 weeks  Then prednisone 4 mg daily for 2 weeks  Then prednisone 3 mg daily for 2 weeks  Then prednisone 2 mg daily for 2 weeks  Then prednisone 1 mg daily for 2 weeks then stop

## 2025-01-08 ENCOUNTER — ANCILLARY PROCEDURE (OUTPATIENT)
Dept: MAMMOGRAPHY | Facility: CLINIC | Age: 75
End: 2025-01-08
Attending: FAMILY MEDICINE
Payer: COMMERCIAL

## 2025-01-08 ENCOUNTER — LAB (OUTPATIENT)
Dept: LAB | Facility: CLINIC | Age: 75
End: 2025-01-08
Payer: COMMERCIAL

## 2025-01-08 DIAGNOSIS — Z12.31 VISIT FOR SCREENING MAMMOGRAM: ICD-10-CM

## 2025-01-08 DIAGNOSIS — D75.89 MACROCYTOSIS: ICD-10-CM

## 2025-01-08 LAB
FOLATE SERPL-MCNC: >40 NG/ML (ref 4.6–34.8)
VIT B12 SERPL-MCNC: 959 PG/ML (ref 232–1245)

## 2025-01-08 PROCEDURE — 82746 ASSAY OF FOLIC ACID SERUM: CPT

## 2025-01-08 PROCEDURE — 77063 BREAST TOMOSYNTHESIS BI: CPT

## 2025-01-08 PROCEDURE — 36415 COLL VENOUS BLD VENIPUNCTURE: CPT

## 2025-01-08 PROCEDURE — 82607 VITAMIN B-12: CPT

## 2025-01-09 ENCOUNTER — TRANSFERRED RECORDS (OUTPATIENT)
Dept: HEALTH INFORMATION MANAGEMENT | Facility: CLINIC | Age: 75
End: 2025-01-09

## 2025-02-03 NOTE — PROGRESS NOTES
[facility] PPM check from 9/10/2024, pt in the ER at [facility] for AF with RVR    Pt has never been seen at [facility] for device cares, unknown why this [facility] was received at our clinic.     [employer] Margarita (D) PPM  Presenting rhythm AF with RVR  Battery 13.1 yrs estimated longevity   Available lead measurements WNL  Mode: AAIR-->DDDR   AP: 59%  : 0.1%  AF burden since 8/26/2024 6%  6 atrial episodes since 8/26/2024, 2 EGMs, both show AF with RVR. Average V rates in AF are  bpm  0 ventricular episodes.   DCCV was performed in ER (after device check)

## 2025-02-11 ENCOUNTER — TRANSFERRED RECORDS (OUTPATIENT)
Dept: HEALTH INFORMATION MANAGEMENT | Facility: CLINIC | Age: 75
End: 2025-02-11
Payer: COMMERCIAL

## 2025-02-28 ENCOUNTER — LAB (OUTPATIENT)
Dept: LAB | Facility: HOSPITAL | Age: 75
End: 2025-02-28
Payer: COMMERCIAL

## 2025-02-28 DIAGNOSIS — M31.6 GCA (GIANT CELL ARTERITIS) (H): ICD-10-CM

## 2025-02-28 DIAGNOSIS — M31.8 SYSTEMIC VASCULITIS (H): ICD-10-CM

## 2025-02-28 LAB
AST SERPL W P-5'-P-CCNC: 34 U/L (ref 0–45)
BASOPHILS # BLD AUTO: 0 10E3/UL (ref 0–0.2)
BASOPHILS NFR BLD AUTO: 0 %
CREAT SERPL-MCNC: 0.75 MG/DL (ref 0.51–0.95)
CRP SERPL-MCNC: <3 MG/L
EGFRCR SERPLBLD CKD-EPI 2021: 82 ML/MIN/1.73M2
EOSINOPHIL # BLD AUTO: 0 10E3/UL (ref 0–0.7)
EOSINOPHIL NFR BLD AUTO: 0 %
ERYTHROCYTE [DISTWIDTH] IN BLOOD BY AUTOMATED COUNT: 12.8 % (ref 10–15)
ERYTHROCYTE [SEDIMENTATION RATE] IN BLOOD BY WESTERGREN METHOD: 9 MM/HR (ref 0–30)
HCT VFR BLD AUTO: 43.8 % (ref 35–47)
HGB BLD-MCNC: 14.5 G/DL (ref 11.7–15.7)
IMM GRANULOCYTES # BLD: 0 10E3/UL
IMM GRANULOCYTES NFR BLD: 1 %
LYMPHOCYTES # BLD AUTO: 1.1 10E3/UL (ref 0.8–5.3)
LYMPHOCYTES NFR BLD AUTO: 13 %
MCH RBC QN AUTO: 33.2 PG (ref 26.5–33)
MCHC RBC AUTO-ENTMCNC: 33.1 G/DL (ref 31.5–36.5)
MCV RBC AUTO: 100 FL (ref 78–100)
MONOCYTES # BLD AUTO: 0.6 10E3/UL (ref 0–1.3)
MONOCYTES NFR BLD AUTO: 7 %
NEUTROPHILS # BLD AUTO: 7 10E3/UL (ref 1.6–8.3)
NEUTROPHILS NFR BLD AUTO: 80 %
NRBC # BLD AUTO: 0 10E3/UL
NRBC BLD AUTO-RTO: 0 /100
PLATELET # BLD AUTO: 221 10E3/UL (ref 150–450)
RBC # BLD AUTO: 4.37 10E6/UL (ref 3.8–5.2)
WBC # BLD AUTO: 8.8 10E3/UL (ref 4–11)

## 2025-02-28 PROCEDURE — 84450 TRANSFERASE (AST) (SGOT): CPT

## 2025-02-28 PROCEDURE — 85004 AUTOMATED DIFF WBC COUNT: CPT

## 2025-02-28 PROCEDURE — 82565 ASSAY OF CREATININE: CPT

## 2025-02-28 PROCEDURE — 36415 COLL VENOUS BLD VENIPUNCTURE: CPT

## 2025-02-28 PROCEDURE — 85652 RBC SED RATE AUTOMATED: CPT

## 2025-02-28 PROCEDURE — 85048 AUTOMATED LEUKOCYTE COUNT: CPT

## 2025-02-28 PROCEDURE — 86140 C-REACTIVE PROTEIN: CPT

## 2025-03-10 ENCOUNTER — TRANSFERRED RECORDS (OUTPATIENT)
Dept: HEALTH INFORMATION MANAGEMENT | Facility: CLINIC | Age: 75
End: 2025-03-10

## 2025-03-25 ENCOUNTER — LAB (OUTPATIENT)
Dept: LAB | Facility: CLINIC | Age: 75
End: 2025-03-25
Payer: COMMERCIAL

## 2025-03-25 DIAGNOSIS — M31.6 GCA (GIANT CELL ARTERITIS) (H): ICD-10-CM

## 2025-03-25 LAB
CRP SERPL-MCNC: <3 MG/L
ERYTHROCYTE [SEDIMENTATION RATE] IN BLOOD BY WESTERGREN METHOD: 8 MM/HR (ref 0–30)

## 2025-03-25 PROCEDURE — 86140 C-REACTIVE PROTEIN: CPT

## 2025-03-25 PROCEDURE — 85652 RBC SED RATE AUTOMATED: CPT

## 2025-03-25 PROCEDURE — 36415 COLL VENOUS BLD VENIPUNCTURE: CPT

## 2025-04-17 ENCOUNTER — MYC MEDICAL ADVICE (OUTPATIENT)
Dept: RHEUMATOLOGY | Facility: CLINIC | Age: 75
End: 2025-04-17
Payer: COMMERCIAL

## 2025-04-17 NOTE — LETTER
"4/17/2025       RE: Renate Tanner  [address] [address]     To whom it may concern,    I am writing this letter to appeal the denial of the PET scan in order to evaluate for concerns of giant cell arteritis. She has clinical signs suggestive of giant cell arteritis and has had recurrence of symptoms despite prednisone therapy therefore additional testing is clinically recommended to evaluate for giant cell arteritis and specifically large vessel involvement that would warrant intensification of her therapy. She had underwent CTA previously that was not diagnostic and she is not able to get MRA since she has pacemaker and 100% paced and the patient and her family are very concerned about getting MRA scans therefore the next step would be to proceed with PET scan to evaluate her disease.     PET scan is currently being used and has been shown to be an excellent test for evaluation and management of patients with giant cell arteritis     Henri East, et al. \"Usefulness of 18F-FDG PET-CT for assessing large-vessel involvement in patients with suspected giant cell arteritis and negative temporal artery biopsy.\" Arthritis Research & Therapy 26.1 (2024): 13.    Fred Gibbs, et al. \"Diagnostic accuracy of positron emission tomography/computed tomography of the head, neck, and chest for giant cell arteritis: a prospective, double?blind, cross?sectional study.\" Arthritis & Rheumatology 71.8 (2019): 9934-9742.    Please find attached the visit notes     Thank you for allowing us to provide safe and effective patient care for the patient     Sincerely,    Tomeka Hong MD      "

## 2025-04-18 NOTE — TELEPHONE ENCOUNTER
Dr. Hong - We received a fax from [employer] for a notice of denial for Renate's PET scan. Please advise.

## 2025-05-01 NOTE — TELEPHONE ENCOUNTER
Unable to find a denial number to put on the letter.  I faxed the denial letter we received, the last OV note from Dr. Hong and the letter for appeal to [phone].

## 2025-06-02 ENCOUNTER — TELEPHONE (OUTPATIENT)
Dept: RHEUMATOLOGY | Facility: CLINIC | Age: 75
End: 2025-06-02

## 2025-06-02 ENCOUNTER — LAB (OUTPATIENT)
Dept: LAB | Facility: CLINIC | Age: 75
End: 2025-06-02
Payer: COMMERCIAL

## 2025-06-02 ENCOUNTER — RESULTS FOLLOW-UP (OUTPATIENT)
Dept: RHEUMATOLOGY | Facility: CLINIC | Age: 75
End: 2025-06-02

## 2025-06-02 DIAGNOSIS — M31.6 GCA (GIANT CELL ARTERITIS) (H): ICD-10-CM

## 2025-06-02 PROCEDURE — 86140 C-REACTIVE PROTEIN: CPT

## 2025-06-02 PROCEDURE — 36415 COLL VENOUS BLD VENIPUNCTURE: CPT

## 2025-06-02 PROCEDURE — 85652 RBC SED RATE AUTOMATED: CPT

## 2025-06-02 NOTE — TELEPHONE ENCOUNTER
Called pt and LVM. I provided her with the phone number for [facility] to get scheduled for this PET scan ([phone]).

## 2025-06-02 NOTE — TELEPHONE ENCOUNTER
[facility] Call Center    Phone Message    May a detailed message be left on voicemail: yes     Reason for Call: Other: patient said insurance has granted the PET appeal and wants a call back to do next steps. Please call back and advise.      Action Taken: Message routed to:  Other: holden    Travel Screening: Not Applicable     Date of Service: 6/2/25

## 2025-06-30 ENCOUNTER — TELEPHONE (OUTPATIENT)
Dept: SURGERY | Facility: CLINIC | Age: 75
End: 2025-06-30
Payer: COMMERCIAL

## 2025-06-30 NOTE — TELEPHONE ENCOUNTER
[facility] Call Center    Phone Message    May a detailed message be left on voicemail: yes     Reason for Call: Other: Hemorrhoids with bleeding - no severe pain, but rectal bleeding. Sending per guidelines. PT requested Dr. Temple (daughter recommended; used to work at [employer] [facility] [place]). However, PT is open to other providers.       Action Taken: Message routed to:  [facility] ([facility] [place]): Colorectal     Travel Screening: Not Applicable     Date of Service:

## 2025-07-01 ENCOUNTER — OFFICE VISIT (OUTPATIENT)
Dept: RHEUMATOLOGY | Facility: CLINIC | Age: 75
End: 2025-07-01
Attending: STUDENT IN AN ORGANIZED HEALTH CARE EDUCATION/TRAINING PROGRAM
Payer: COMMERCIAL

## 2025-07-01 VITALS
OXYGEN SATURATION: 98 % | SYSTOLIC BLOOD PRESSURE: 116 MMHG | HEART RATE: 74 BPM | BODY MASS INDEX: 32.22 KG/M2 | WEIGHT: 165 LBS | DIASTOLIC BLOOD PRESSURE: 74 MMHG

## 2025-07-01 DIAGNOSIS — K20.90 ESOPHAGITIS: Primary | ICD-10-CM

## 2025-07-01 DIAGNOSIS — K76.9 HEPATIC LESION: ICD-10-CM

## 2025-07-01 DIAGNOSIS — Z79.899 HIGH RISK MEDICATION USE: ICD-10-CM

## 2025-07-01 DIAGNOSIS — M31.8 SYSTEMIC VASCULITIS (H): ICD-10-CM

## 2025-07-01 DIAGNOSIS — M31.6 GCA (GIANT CELL ARTERITIS) (H): ICD-10-CM

## 2025-07-01 PROCEDURE — 99215 OFFICE O/P EST HI 40 MIN: CPT | Performed by: STUDENT IN AN ORGANIZED HEALTH CARE EDUCATION/TRAINING PROGRAM

## 2025-07-01 PROCEDURE — 3074F SYST BP LT 130 MM HG: CPT | Performed by: STUDENT IN AN ORGANIZED HEALTH CARE EDUCATION/TRAINING PROGRAM

## 2025-07-01 PROCEDURE — G0463 HOSPITAL OUTPT CLINIC VISIT: HCPCS | Performed by: STUDENT IN AN ORGANIZED HEALTH CARE EDUCATION/TRAINING PROGRAM

## 2025-07-01 PROCEDURE — 1125F AMNT PAIN NOTED PAIN PRSNT: CPT | Performed by: STUDENT IN AN ORGANIZED HEALTH CARE EDUCATION/TRAINING PROGRAM

## 2025-07-01 PROCEDURE — G2211 COMPLEX E/M VISIT ADD ON: HCPCS | Performed by: STUDENT IN AN ORGANIZED HEALTH CARE EDUCATION/TRAINING PROGRAM

## 2025-07-01 PROCEDURE — 3078F DIAST BP <80 MM HG: CPT | Performed by: STUDENT IN AN ORGANIZED HEALTH CARE EDUCATION/TRAINING PROGRAM

## 2025-07-01 RX ORDER — PREDNISONE 5 MG/1
TABLET ORAL
Qty: 60 TABLET | Refills: 1 | Status: SHIPPED | OUTPATIENT
Start: 2025-07-01

## 2025-07-01 RX ORDER — PREDNISONE 1 MG/1
TABLET ORAL
Qty: 200 TABLET | Refills: 2 | Status: SHIPPED | OUTPATIENT
Start: 2025-07-01

## 2025-07-01 ASSESSMENT — ENCOUNTER SYMPTOMS
FEVER: 0
HEADACHES: 0
PHOTOPHOBIA: 0
BLURRED VISION: 1
ABDOMINAL PAIN: 0
WEIGHT LOSS: 0
CLAUDICATION: 0
COUGH: 0
BACK PAIN: 1
DOUBLE VISION: 1

## 2025-07-01 ASSESSMENT — PAIN SCALES - GENERAL: PAINLEVEL_OUTOF10: SEVERE PAIN (7)

## 2025-07-01 NOTE — LETTER
7/1/2025       RE: Renate Tanner  [address] [id number]     Dear Colleague,    Thank you for referring your patient, Renate Tanner, to the [facility] RHEUMATOLOGY at [facility]. Please see a copy of my visit note below.    RHEUMATOLOGY OUTPATIENT CLINIC NOTE     Referring Provider:   James Correia MD  [address] [place] [address]    Lab review      ANCA by IFA: Negative  MPO/AZ-3: Negative    Temporal artery biopsy:   A.  Left temporal artery, biopsy-Negative for Temporal (Giant Cell) arteritis.  B.  Right temporal artery, biopsy-Negative for Temporal (Giant Cell) arteritis. Focus of adventitial only chronic inflammation which is considered nonspecific.    Imaging review     Ultrasound of the temporal artery, 10/2024:  No evidence of active vasculitis    CTA chest, abdomen and pelvis with contrast 10/2024:  1. No aortitis or arteritis demonstrated.  2. Thin right vertebral artery.  3. Celiac artery median arcuate ligament anatomy. Correlate for symptoms. 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    PET and CT on  6/10/2025  1. Mild circumferential hypermetabolic activity involving the thoracic aorta, suggesting inflammation and thus could be compatible with clinically suspected giant cell arteritis.     2. Mild diffuse esophageal FDG uptake uptake, possibly related to esophagitis. Recommend correlation for symptoms.  3. Focal uptake in the anal canal while typically physiologic or benign such as a hemorrhoid, cannot rule out an underlying pathology.      Subjective    Visit date April 1, 2025      Renate is a 74 year old female who presents today for follow up.     Since the last visit,    Workup on 6/27/2025    Hemoglobin within normal limits   WBC  within normal limits   Platelets  within normal limits   ESR  within normal limits   CRP  within normal limits   AST  within  normal limits   Creatinine  within normal limits     PET scan on June 2025 showed mild circumferential activity in the thoracic aorta suggestive of aortitis, diffuse esophageal uptake, focal uptake in the anal canal, hypoattenuation of the left hepatic lobe lesion    Today, Renate mentioned the following:    She is overall doing well.    She denies headache    She remains with intermittent headache and blurry vision in the left side as well, she is following with ophthalmologist. She has bilateral macular degeneration and was diagnosed with cataract and she had shot in her for wet AMD about 3 weeks ago.     She remains with back pain    Review of Systems   Constitutional:  Negative for fever, malaise/fatigue and weight loss.   Eyes:  Positive for blurred vision and double vision. Negative for photophobia.   Respiratory:  Negative for cough.    Cardiovascular:  Negative for claudication.   Gastrointestinal:  Negative for abdominal pain.   Musculoskeletal:  Positive for back pain. Negative for joint pain.   Neurological:  Negative for headaches.        Negative for scalp tenderness or jaw claudications        Current Medications   Prednisone 6 mg daily for 6 weeks     Bactrim daily, currently not taking     Past Medical history   Past Medical History:   Diagnosis Date     Arrhythmia      Pacemaker        Objective  /74 (BP Location: Right arm, Patient Position: Sitting, Cuff Size: Adult Regular)   Pulse 74   Wt 74.8 kg (165 lb)   SpO2 98%   BMI 32.22 kg/m        PHYSICAL EXAMINATION  Physical Exam  HENT:      Ears:      Comments: Temporal artery: bilateral palpable without tenderness     Mouth/Throat:      Mouth: Mucous membranes are moist.   Eyes:      Conjunctiva/sclera: Conjunctivae normal.   Pulmonary:      Effort: Pulmonary effort is normal.      Breath sounds: Normal breath sounds.   Musculoskeletal:         General: No swelling or tenderness.      Cervical back: Normal range of motion.   Skin:      Findings: No rash.   Neurological:      Mental Status: She is alert.       Assessment & Plan    # Concern for giant cell arteritis  # New onset cranial symptoms [headache, scalp tenderness] and systemic symptoms  # Temporal artery biopsy reported as negative for arteritis however showing chronic adventitial inflammation  # Elevated inflammatory markers, responded to steroids  # Chronic joint pain    # Screening for large vessel vasculitis  # Screening for chronic infections  # Longitudinal care    Renate is following with rheumatology for concerns of giant cell arteritis  Onset: 2024  Involvement: Cranial symptoms, systemic symptoms, no visual symptoms  Temporal artery biopsy reported as negative for arteritis however showing chronic adventitial inflammation and focal disruption of elastic lamina  Elevated inflammatory markers with excellent response to steroids  No evidence of LVV on CTA chest, abdomen and pelvis  Temporal artery ultrasound negative for arteritis while steroids    # Giant cell arteritis  # New onset cranial symptoms [headache, scalp tenderness] and systemic symptoms  # Elevated inflammatory markers, responded to steroids  # Temporal artery biopsy reported as negative for arteritis however showing chronic adventitial inflammation and focal disruption of elastic lamina  # PET scan showing uptake in the thoracic aorta consistent with aortitis    Renate presented today for follow-up.    She was able to get PET scan after insurance appeal and ultimately showed uptake in the thoracic aorta consistent with aortitis therefore her disease is consistent with giant cell arteritis.    Other findings and the PET scan showed uptake in the anal canal, esophagitis, hypoattenuation in the liver which will need evaluation.    She remains overall symptomatically stable from GCA standpoint and labs overall stable as in HPI.    Given evidence of large vessel vasculitis then we discussed initiation of tocilizumab which is  FDA approved treatment for giant cell arteritis, risks and side effects of tocilizumab were discussed including increased risk of infections [bacterial, viral, fungal, mycobacterial, organ infection, diverticulitis] and she was in agreement to start tocilizumab.      We will complete evaluation for other abnormalities on PET scan prior to initiation of tocilizumab and she will remain on prednisone 6 mg for now and we will taper prednisone slowly afterwards after she is started on Actemra    She is in agreement with this plan and seems comfortable with this approach     Plan:    - Continue prednisone 6 mg daily     - Actemra prior authorization but hold off on initiation of Actemra till additional workup is completed    - Afterwards can taper prednisone by 1 mg every 2 weeks till completely off       If you experience any symptoms suggestive of flare including (recurrence of headache, scalp tenderness, jaw claudications, arm/leg claudications, unexplained fever, weight loss) then reach out to Rheumatology office     # Abnormal PET scan  Plan:  Colorectal referral  GI referral  Triphasic CT for hepatic lesion    # Screening for large vessel vasculitis  CTA chest, abdomen and pelvis in 10/2024 was negative for large vessel vasculitis in the imaged territories  PET scan showing evidence of aortitis      # Screening for chronic infections  2024  Hepatitis B surface antigen: Negative  Hepatitis B core: Negative  Hepatitis B surface antibody: Negative   Hepatitis C antibody: Negative   QuantiFERON gold: Negative    # Longitudinal care  The longitudinal plan of care for the diagnosis(es)/condition(s) as documented were addressed during this visit. Due to the added complexity in care, I will continue to support Renate in the subsequent management and with ongoing continuity of care.    Review of the result(s) of each unique test - as HPI  Ordering of each unique test  Prescription drug management      41 minutes spent by me on  the date of the encounter doing chart review, history and exam, documentation and further activities per the note      Return in about 3 months (around 10/1/2025) for Follow up.    Tomeka Hong MD  [facility] RHEUMATOLOGY    Again, thank you for allowing me to participate in the care of your patient.      Sincerely,    Tomeka Hong MD

## 2025-07-01 NOTE — PROGRESS NOTES
RHEUMATOLOGY OUTPATIENT CLINIC NOTE     Referring Provider:   James Correia MD  [address] [place] [address]    Lab review      ANCA by IFA: Negative  MPO/UT-3: Negative    Temporal artery biopsy:   A.  Left temporal artery, biopsy-Negative for Temporal (Giant Cell) arteritis.  B.  Right temporal artery, biopsy-Negative for Temporal (Giant Cell) arteritis. Focus of adventitial only chronic inflammation which is considered nonspecific.    Imaging review     Ultrasound of the temporal artery, 10/2024:  No evidence of active vasculitis    CTA chest, abdomen and pelvis with contrast 10/2024:  1. No aortitis or arteritis demonstrated.  2. Thin right vertebral artery.  3. Celiac artery median arcuate ligament anatomy. Correlate for symptoms. 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    PET and CT on  6/10/2025  1. Mild circumferential hypermetabolic activity involving the thoracic aorta, suggesting inflammation and thus could be compatible with clinically suspected giant cell arteritis.     2. Mild diffuse esophageal FDG uptake uptake, possibly related to esophagitis. Recommend correlation for symptoms.  3. Focal uptake in the anal canal while typically physiologic or benign such as a hemorrhoid, cannot rule out an underlying pathology.      Subjective     Visit date April 1, 2025      Renate is a 74 year old female who presents today for follow up.     Since the last visit,    Workup on 6/27/2025    Hemoglobin within normal limits   WBC  within normal limits   Platelets  within normal limits   ESR  within normal limits   CRP  within normal limits   AST  within normal limits   Creatinine  within normal limits     PET scan on June 2025 showed mild circumferential activity in the thoracic aorta suggestive of aortitis, diffuse esophageal uptake, focal uptake in the anal canal, hypoattenuation of the left hepatic lobe lesion    Today, Renate mentioned the  following:    She is overall doing well.    She denies headache    She remains with intermittent headache and blurry vision in the left side as well, she is following with ophthalmologist. She has bilateral macular degeneration and was diagnosed with cataract and she had shot in her for wet AMD about 3 weeks ago.     She remains with back pain    Review of Systems   Constitutional:  Negative for fever, malaise/fatigue and weight loss.   Eyes:  Positive for blurred vision and double vision. Negative for photophobia.   Respiratory:  Negative for cough.    Cardiovascular:  Negative for claudication.   Gastrointestinal:  Negative for abdominal pain.   Musculoskeletal:  Positive for back pain. Negative for joint pain.   Neurological:  Negative for headaches.        Negative for scalp tenderness or jaw claudications        Current Medications   Prednisone 6 mg daily for 6 weeks     Bactrim daily, currently not taking     Past Medical history   Past Medical History:   Diagnosis Date    Arrhythmia     Pacemaker        Objective   /74 (BP Location: Right arm, Patient Position: Sitting, Cuff Size: Adult Regular)   Pulse 74   Wt 74.8 kg (165 lb)   SpO2 98%   BMI 32.22 kg/m        PHYSICAL EXAMINATION  Physical Exam  HENT:      Ears:      Comments: Temporal artery: bilateral palpable without tenderness     Mouth/Throat:      Mouth: Mucous membranes are moist.   Eyes:      Conjunctiva/sclera: Conjunctivae normal.   Pulmonary:      Effort: Pulmonary effort is normal.      Breath sounds: Normal breath sounds.   Musculoskeletal:         General: No swelling or tenderness.      Cervical back: Normal range of motion.   Skin:     Findings: No rash.   Neurological:      Mental Status: She is alert.       Assessment & Plan     # Concern for giant cell arteritis  # New onset cranial symptoms [headache, scalp tenderness] and systemic symptoms  # Temporal artery biopsy reported as negative for arteritis however showing chronic  adventitial inflammation  # Elevated inflammatory markers, responded to steroids  # Chronic joint pain    # Screening for large vessel vasculitis  # Screening for chronic infections  # Longitudinal care    Renate is following with rheumatology for concerns of giant cell arteritis  Onset: 2024  Involvement: Cranial symptoms, systemic symptoms, no visual symptoms  Temporal artery biopsy reported as negative for arteritis however showing chronic adventitial inflammation and focal disruption of elastic lamina  Elevated inflammatory markers with excellent response to steroids  No evidence of LVV on CTA chest, abdomen and pelvis  Temporal artery ultrasound negative for arteritis while steroids    # Giant cell arteritis  # New onset cranial symptoms [headache, scalp tenderness] and systemic symptoms  # Elevated inflammatory markers, responded to steroids  # Temporal artery biopsy reported as negative for arteritis however showing chronic adventitial inflammation and focal disruption of elastic lamina  # PET scan showing uptake in the thoracic aorta consistent with aortitis    Renate presented today for follow-up.    She was able to get PET scan after insurance appeal and ultimately showed uptake in the thoracic aorta consistent with aortitis therefore her disease is consistent with giant cell arteritis.    Other findings and the PET scan showed uptake in the anal canal, esophagitis, hypoattenuation in the liver which will need evaluation.    She remains overall symptomatically stable from GCA standpoint and labs overall stable as in HPI.    Given evidence of large vessel vasculitis then we discussed initiation of tocilizumab which is FDA approved treatment for giant cell arteritis, risks and side effects of tocilizumab were discussed including increased risk of infections [bacterial, viral, fungal, mycobacterial, organ infection, diverticulitis] and she was in agreement to start tocilizumab.      We will complete evaluation  for other abnormalities on PET scan prior to initiation of tocilizumab and she will remain on prednisone 6 mg for now and we will taper prednisone slowly afterwards after she is started on Actemra    She is in agreement with this plan and seems comfortable with this approach     Plan:    - Continue prednisone 6 mg daily     - Actemra prior authorization but hold off on initiation of Actemra till additional workup is completed    - Afterwards can taper prednisone by 1 mg every 2 weeks till completely off       If you experience any symptoms suggestive of flare including (recurrence of headache, scalp tenderness, jaw claudications, arm/leg claudications, unexplained fever, weight loss) then reach out to Rheumatology office     # Abnormal PET scan  Plan:  Colorectal referral  GI referral  Triphasic CT for hepatic lesion    # Screening for large vessel vasculitis  CTA chest, abdomen and pelvis in 10/2024 was negative for large vessel vasculitis in the imaged territories  PET scan showing evidence of aortitis      # Screening for chronic infections  2024  Hepatitis B surface antigen: Negative  Hepatitis B core: Negative  Hepatitis B surface antibody: Negative   Hepatitis C antibody: Negative   QuantiFERON gold: Negative    # Longitudinal care  The longitudinal plan of care for the diagnosis(es)/condition(s) as documented were addressed during this visit. Due to the added complexity in care, I will continue to support Renate in the subsequent management and with ongoing continuity of care.    Review of the result(s) of each unique test - as HPI  Ordering of each unique test  Prescription drug management      41 minutes spent by me on the date of the encounter doing chart review, history and exam, documentation and further activities per the note      Return in about 3 months (around 10/1/2025) for Follow up.    Tomeka Hong MD  [facility] RHEUMATOLOGY

## 2025-07-01 NOTE — NURSING NOTE
Chief Complaint   Patient presents with    RECHECK     /74 (BP Location: Right arm, Patient Position: Sitting, Cuff Size: Adult Regular)   Pulse 74   Wt 74.8 kg (165 lb)   SpO2 98%   BMI 32.22 kg/m

## 2025-07-01 NOTE — PATIENT INSTRUCTIONS
Our plan for today is:    GI consult     [place]-rectal consult    - Tests:    - CT liver     - Labs 1 week before the next appointment     - Medications:    Stay on prednisone 6 mg daily

## 2025-07-02 ENCOUNTER — PATIENT OUTREACH (OUTPATIENT)
Dept: CARE COORDINATION | Facility: CLINIC | Age: 75
End: 2025-07-02
Payer: COMMERCIAL

## 2025-07-07 NOTE — TELEPHONE ENCOUNTER
Diagnosis, Referred by & from: Hemorrhoids // per pt // recs in Epic, [facility], PT requesting Dr Temple    Appt date: 7/28/25   NOTES STATUS DETAILS   OFFICE NOTE from referring provider N/A    OFFICE NOTE from other specialist Care Everywhere [facility]:  4/19/21 - OV [facility] Med w/ Gerald Carter MD     DISCHARGE SUMMARY from hospital N/A    DISCHARGE REPORT from the ER N/A    OPERATIVE REPORT N/A    MEDICATION LIST Internal    LABS     ANAL PAP/CEA N/A    BIOPSIES/PATHOLOGY RELATED TO DIAGNOSIS N/A    DIAGNOSTIC PROCEDURES     PFC TESTING (from the Pelvic floor center includes Manometry, PDNL, EMG, etc.) N/A    COLONOSCOPY (most recent all time after 5 years) Internal and CE [facility]:  2/16/23    [facility]:  11/5/18   FLEX SIGMOIDOSCOPY N/A    UPPER ENDOSCOPY (EGD) N/A    ERCP N/A    IMAGING (DISC & REPORT)      CT Internal [facility]:  10/23/24 - CTA CHEST ABDOMEN PELVIS   MRI N/A    XRAY Internal [facility]:  7/9/19 - XR PELVIS   ULTRASOUND  (ENDOANAL/ENDORECTAL) Internal [facility]:  8/26/24 - US ABDOMEN    [facility]:  4/21/21 - US ABDOMEN     Records Requested  07/07/25    Facility  [facility]  Fax: [phone]     [facility]: [phone]    Outcome -sent req for recs to be faxed to [facility] and sent img req to John    7/8/25 - [facility] recs sent to HIM

## 2025-07-08 ENCOUNTER — MYC MEDICAL ADVICE (OUTPATIENT)
Dept: RHEUMATOLOGY | Facility: CLINIC | Age: 75
End: 2025-07-08
Payer: COMMERCIAL

## 2025-07-08 DIAGNOSIS — K20.90 ESOPHAGITIS: Primary | ICD-10-CM

## 2025-07-15 ENCOUNTER — TELEPHONE (OUTPATIENT)
Dept: GASTROENTEROLOGY | Facility: CLINIC | Age: 75
End: 2025-07-15
Payer: COMMERCIAL

## 2025-07-15 NOTE — TELEPHONE ENCOUNTER
Endoscopy Scheduling Screen    Caller: patient    Have you had any respiratory illness or flu-like symptoms in the last 10 days?  No    Patient is ACTIVE on [employer].  Inform patient that all appointment instructions will be sent via [employer].    Review patient's insurance for any non participating payor.    Ordering/Referring Provider:     ATUL SMITH      (If ordering provider performs procedure, schedule with ordering provider unless otherwise instructed. )    BMI: Estimated body mass index is 32.22 kg/m  as calculated from the following:    Height as of 9/24/24: 1.524 m (5').    Weight as of 7/1/25: 74.8 kg (165 lb).     Sedation Ordered  moderate sedation.   If patient BMI > 50 do not schedule in [facility].    If patient BMI > 45 do not schedule at [facility].    Are you taking methadone or Suboxone?  NO, No RN review required.    Have you been diagnosed and are being treated for severe PTSD or severe anxiety?  NO, No RN review required.    Are you taking any prescription medications for pain 3 or more times per week?   YES, Schedule with [facility]. (If patient has additional questions please InBasket to RN pool for .)    Do you have a history of malignant hyperthermia?  No    (Females) Are you currently pregnant?        Have you been diagnosed or told you have pulmonary hypertension?   No    Do you have an LVAD?  No    Have you been told you have moderate to severe sleep apnea?  No.    Have you been told you have COPD, asthma, or any other lung disease?  No    Has your doctor ordered any cardiac tests like echo, angiogram, stress test, ablation, or EKG, that you have not completed yet?  No    Do you  have a history of any heart conditions?  Yes     Have you had any hospitalizations  in the last year for heart related issues, for example a stent placement, heart attack, or cardiomyopathy?  No    Do you have any implantable devices in your body (pacemaker, ICD)?  Pacemaker (schedule colonoscopy in Hospital  "Only)    Do you take nitroglycerine?  No    Have you ever had or are you waiting for an organ transplant?  No. Continue scheduling, no site restrictions.    Have you had a stroke or transient ischemic attack (TIA aka \"mini stroke\") in the last 2 years?   No.    Have you been diagnosed with or been told you have cirrhosis of the liver?   No.    Are you currently on dialysis?   No    Do you need assistance transferring?   No    BMI: Estimated body mass index is 32.22 kg/m  as calculated from the following:    Height as of 9/24/24: 1.524 m (5').    Weight as of 7/1/25: 74.8 kg (165 lb).     Is patients BMI > 40 and scheduling location [facility]?  No    Do you take an injectable or oral medication for weight loss or diabetes (excluding insulin)?  No    Do you take the medication Naltrexone?  No    Do you take blood thinners?  No       Prep   Are you currently on dialysis or do you have chronic kidney disease?  No    Do you have a diagnosis of diabetes?  No    Do you have a diagnosis of cystic fibrosis (CF)?  No    On a regular basis do you go 3 -5 days between bowel movements?  No    BMI > 40?  No    Preferred Pharmacy:    [facility] [address] [place] [id number]  Phone: [phone] Fax: [phone]      Final Scheduling Details     Procedure scheduled  Upper endoscopy (EGD)    Surgeon:  ANATOLIY     Date of procedure:  8/19     Pre-OP / PAC:   No - Not required for this site.    Location  [facility] - Patient preference.    Sedation   [facility]/Deep Sedation - Per exclusion criteria.      Patient Reminders:   You will receive a call from a Nurse to review instructions and health history.  This assessment must be completed prior to your procedure.  Failure to complete the Nurse assessment may result in the procedure being cancelled.      On the day of your procedure, please designate an adult(s) who can drive you home stay with you for the next 24 hours. The medicines used in the exam will " make you sleepy. You will not be able to drive.      You cannot take public transportation, ride share services, or non-medical taxi service without a responsible caregiver.  Medical transport services are allowed with the requirement that a responsible caregiver will receive you at your destination.  We require that drivers and caregivers are confirmed prior to your procedure.

## 2025-07-15 NOTE — PROGRESS NOTES
Colon and Rectal Surgery Clinic Note    RE: Renate Tanner.  : 1950.  SARAH: 2025.    Reason for visit: Hemorrhoids.    HPI:  74-year-old female with recent diagnosis of giant cell arteritis on extended steroid taper (currently on 6 mg daily) and past medical history significant for coronary artery disease, A-fib with RVR with pacemaker in place, on Xarelto who presents with symptoms of anal protrusion and intermittent bleeding associated with perianal swelling.  Having soft and formed bowel movements and denies constipation or straining but does spend quite a bit of time in the bathroom on her phone playing solitary.  She does have to manually reduce the internal hemorrhoids when they prolapse but this is quite easy. One previous vaginal delivery with obstetric care.  Denies fecal incontinence.  Follows with Dr. Hong for this. Plan for Actemra after additional workup is complete.   PET CT 6/10/25:  IMPRESSION:   1. Mild circumferential hypermetabolic activity involving the thoracic  aorta, suggesting inflammation and thus could be compatible with  clinically suspected giant cell arteritis.  2. Mild diffuse esophageal FDG uptake uptake, possibly related to  esophagitis. Recommend correlation for symptoms.  3. Focal uptake in the anal canal while typically physiologic or  benign such as a hemorrhoid, cannot rule out an underlying pathology.    Colonoscopy 23 by Dr. Felipe Castañeda:    CT Liver 25:  IMPRESSION:  1.  1.8 cm lobulated simple cyst segment Efrem. No suspicious hepatic lesions.  Is scheduled for an EGD in 3 weeks.    Medical history:  Past Medical History:   Diagnosis Date    Arrhythmia     Pacemaker        Surgical history:  Past Surgical History:   Procedure Laterality Date    ANESTHESIA CARDIOVERSION N/A 2024    Procedure: Anesthesia cardioversion;  Surgeon: GENERIC ANESTHESIA PROVIDER;  Location: [facility] OR    BIOPSY ARTERY TEMPORAL Bilateral 2024    Procedure: BIOPSY,  ARTERY, TEMPORAL;  Surgeon: Young French MD;  Location: [facility] OR    INJECT EPIDURAL CAUDAL N/A 9/5/2023    Procedure: INJECTION, EPIDURAL, Caudal Racz Catheter;  Surgeon: Juan Pablo Broderick MD;  Location: [facility] OR    IR TRANSLAMINAR EPIDURAL LUMBAR INJ INCL IMAGING  12/26/2018       Family history:  Family History   Problem Relation Age of Onset    Ovarian Cancer Sister        Medications:  Current Outpatient Medications   Medication Sig Dispense Refill    alendronate (FOSAMAX) 70 MG tablet Take 70 mg by mouth every 7 days.      ascorbic acid (VITAMIN C) 1000 MG TABS Take 1,000 mg by mouth daily      Biotin 5000 MCG TABS Take 3 tablets by mouth daily.      Calcium Carbonate-Vit D-Min (CALCIUM 600+D PLUS MINERALS PO) Take 1 tablet by mouth daily      carboxymethylcellulose PF (REFRESH PLUS) 0.5 % ophthalmic solution Place 1 drop into both eyes 4 times daily as needed for dry eyes.      fexofenadine (ALLEGRA ALLERGY) 180 MG tablet Take 180 mg by mouth daily      fish oil-omega-3 fatty acids 1000 MG capsule Take 1 g by mouth daily.      furosemide (LASIX) 20 MG tablet Take 20 mg by mouth daily      GLUCOSAMINE-CHONDROITIN PO Take 1 tablet by mouth daily. 1500-1200mg      HEMP OIL OR EXTRACT OR OTHER CBD CANNABINOID, NOT MEDICAL CANNABIS, Take 1 capsule by mouth every evening. CBD capsule      HYDROcodone-acetaminophen (NORCO) 5-325 MG tablet Take 0.5-1 tablets by mouth daily as needed for breakthrough pain.      Multiple Vitamins-Minerals (PRESERVISION AREDS 2) CAPS Take 1 capsule by mouth 2 times daily.      multivitamin, therapeutic (THERA-VIT) TABS tablet Take 1 tablet by mouth daily.      predniSONE (DELTASONE) 1 MG tablet Combine with prednisone 1 mg tablets to be used as follows continue prednisone 6 mg daily as instructed 200 tablet 2    predniSONE (DELTASONE) 5 MG tablet Combine with prednisone 1 mg daily to be used as follows 60 tablet 1    rivaroxaban ANTICOAGULANT (XARELTO) 20 MG TABS tablet Take 20  mg by mouth daily.      rosuvastatin (CRESTOR) 20 MG tablet Take 20 mg by mouth every evening.      traZODone (DESYREL) 50 MG tablet Take 100 mg by mouth at bedtime.         Allergies:  Allergies   Allergen Reactions    Seasonal Allergies        Social history:   Social History     Tobacco Use    Smoking status: Never    Smokeless tobacco: Never   Substance Use Topics    Alcohol use: Yes     Comment: 2 drinks per week     Marital status: .    Physical Examination:  /74 (BP Location: Left arm, Patient Position: Sitting, Cuff Size: Adult Regular)   Pulse 80   Ht 1.524 m (5')   Wt 75.5 kg (166 lb 6.4 oz)   SpO2 98%   BMI 32.50 kg/m    General: Well hydrated. No acute distress.  Perianal external examination:  Perianal skin: Moist.  Lesions: No.  Eversion of buttocks: There was evidence of an anal fissure. Details: Posterior midline, epithelialized.  Skin tags or external hemorrhoids: Yes: Mild to moderate external component, mainly at the right anterior aspect.  She does have a right posterior and left lateral internal components that prolapse but easily reduce.  Digital rectal examination: Was performed.   Sphincter tone: Fair.  Palpable lesions: No.  Other: A small  rectocele was appreciated on bearing down.  Bimanual examination: was not performed.    Anoscopy: Was performed.   Hemorrhoids: Yes.  Internal hemorrhoids at the right posterior and left lateral positions with no evidence of active bleeding or thrombosis  Lesions: No    ASSESSMENT  74-year-old female with complex past medical history who presents with symptomatic hemorrhoids, mainly internal component.  She is spending quite a bit of time in the bathroom.  No overwhelming constipation.  Xarelto may be a factor with the intermittent bleeding. Risks, benefits, and alternatives of operative treatment were thoroughly discussed with the patient, he/she understands these well and agrees to proceed.    PLAN  Would not recommend hemorrhoidectomy  at this time given minor external component and extensive past medical history.  Unfortunately she is not a candidate for banding because of her anticoagulation.    High-fiber diet.  Start Metamucil 3 times daily.  Calmoseptine as needed for perianal skin irritation.    If hemorrhoid symptoms do not improve or worsen, return to clinic to further discuss THD.    30 minutes spent on the date of encounter performing chart review, history and exam, documentation and further activities as noted above with an additional 10 minutes for anoscopy.     Jarrod Temple MD, MSc, FACS, FASCRS.  Professor and Chief, Division of Colon & Rectal Surgery  Stanley M. Goldberg, MD Endowed Chair, Colon & Rectal Surgery  Department of Surgery, [facility]      Referring Provider:  Referred Self, MD  No address on file     Primary Care Provider:  Sky Samaniego

## 2025-07-16 ENCOUNTER — TELEPHONE (OUTPATIENT)
Dept: RHEUMATOLOGY | Facility: CLINIC | Age: 75
End: 2025-07-16
Payer: COMMERCIAL

## 2025-07-16 NOTE — TELEPHONE ENCOUNTER
[facility] Call Center    Phone Message    May a detailed message be left on voicemail: yes     Reason for Call: Other: Additional info needed from Provider for Authorization of CT requested for insurance with [employer]. CPT  requested is  59034 Please call to speak with a rep and go over next steps .      Action Taken: Other: RHEUM     Travel Screening: Not Applicable     Date of Service:

## 2025-07-17 NOTE — TELEPHONE ENCOUNTER
Returned call to [facility] to get clarification on what is needed. Call transferred to nurse reviewer. Per nurse reviewer, approval received for CT and no further information needed.     Approval number is [id number] with valid dates 7/11/25-9/8/25      Berna Grove RN

## 2025-07-21 ENCOUNTER — MYC MEDICAL ADVICE (OUTPATIENT)
Dept: SURGERY | Facility: CLINIC | Age: 75
End: 2025-07-21
Payer: COMMERCIAL

## 2025-07-23 ENCOUNTER — HOSPITAL ENCOUNTER (OUTPATIENT)
Dept: CT IMAGING | Facility: HOSPITAL | Age: 75
Discharge: HOME | End: 2025-07-23
Attending: STUDENT IN AN ORGANIZED HEALTH CARE EDUCATION/TRAINING PROGRAM
Payer: COMMERCIAL

## 2025-07-23 DIAGNOSIS — K76.9 HEPATIC LESION: ICD-10-CM

## 2025-07-23 PROCEDURE — 74178 CT ABD&PLV WO CNTR FLWD CNTR: CPT

## 2025-07-23 PROCEDURE — 250N000011 HC RX IP 250 OP 636: Performed by: STUDENT IN AN ORGANIZED HEALTH CARE EDUCATION/TRAINING PROGRAM

## 2025-07-23 PROCEDURE — 250N000009 HC RX 250: Performed by: STUDENT IN AN ORGANIZED HEALTH CARE EDUCATION/TRAINING PROGRAM

## 2025-07-23 RX ORDER — IOPAMIDOL 755 MG/ML
81 INJECTION, SOLUTION INTRAVASCULAR ONCE
Status: COMPLETED | OUTPATIENT
Start: 2025-07-23 | End: 2025-07-23

## 2025-07-23 RX ADMIN — IOPAMIDOL 81 ML: 755 INJECTION, SOLUTION INTRAVENOUS at 11:33

## 2025-07-23 RX ADMIN — SODIUM CHLORIDE 100 ML: 9 INJECTION, SOLUTION INTRAVENOUS at 11:35

## 2025-07-24 ENCOUNTER — DOCUMENTATION ONLY (OUTPATIENT)
Dept: GASTROENTEROLOGY | Facility: CLINIC | Age: 75
End: 2025-07-24
Payer: COMMERCIAL

## 2025-07-24 NOTE — PROGRESS NOTES
Records Requested  07/24/25    Facility  [facility]  Fax: [phone]   Outcome Request sent to [place] for records 7/23.    7[facility]24 [facility]

## 2025-07-28 ENCOUNTER — OFFICE VISIT (OUTPATIENT)
Dept: SURGERY | Facility: CLINIC | Age: 75
End: 2025-07-28
Payer: COMMERCIAL

## 2025-07-28 ENCOUNTER — PRE VISIT (OUTPATIENT)
Dept: SURGERY | Facility: CLINIC | Age: 75
End: 2025-07-28

## 2025-07-28 VITALS
WEIGHT: 166.4 LBS | SYSTOLIC BLOOD PRESSURE: 132 MMHG | BODY MASS INDEX: 32.67 KG/M2 | OXYGEN SATURATION: 98 % | HEART RATE: 80 BPM | HEIGHT: 60 IN | DIASTOLIC BLOOD PRESSURE: 74 MMHG

## 2025-07-28 DIAGNOSIS — K64.8 HEMORRHOIDS, INTERNAL: Primary | ICD-10-CM

## 2025-07-28 PROCEDURE — 46600 DIAGNOSTIC ANOSCOPY SPX: CPT | Performed by: COLON & RECTAL SURGERY

## 2025-07-28 PROCEDURE — 3078F DIAST BP <80 MM HG: CPT | Performed by: COLON & RECTAL SURGERY

## 2025-07-28 PROCEDURE — 99204 OFFICE O/P NEW MOD 45 MIN: CPT | Mod: 25 | Performed by: COLON & RECTAL SURGERY

## 2025-07-28 PROCEDURE — 3075F SYST BP GE 130 - 139MM HG: CPT | Performed by: COLON & RECTAL SURGERY

## 2025-07-28 PROCEDURE — 1126F AMNT PAIN NOTED NONE PRSNT: CPT | Performed by: COLON & RECTAL SURGERY

## 2025-07-28 RX ORDER — ANORECTAL OINTMENT 15.7; .44; 24; 20.6 G/100G; G/100G; G/100G; G/100G
OINTMENT TOPICAL 4 TIMES DAILY PRN
Qty: 113 G | Refills: 3 | Status: SHIPPED | OUTPATIENT
Start: 2025-07-28

## 2025-07-28 RX ORDER — POLYETHYLENE GLYCOL 3350 17 G/17G
1 POWDER, FOR SOLUTION ORAL DAILY PRN
Qty: 500 G | Refills: 1 | Status: SHIPPED | OUTPATIENT
Start: 2025-07-28

## 2025-07-28 ASSESSMENT — PAIN SCALES - GENERAL: PAINLEVEL_OUTOF10: NO PAIN (0)

## 2025-07-28 NOTE — NURSING NOTE
Chief Complaint   Patient presents with    Hemorrhoids       Vitals:    07/28/25 1018   BP: 132/74   BP Location: Left arm   Patient Position: Sitting   Cuff Size: Adult Regular   Pulse: 80   SpO2: 98%   Weight: 166 lb 6.4 oz   Height: 5'       Body mass index is 32.5 kg/m .    Samira Hemphill, EMT

## 2025-07-28 NOTE — LETTER
2025       RE: Renate Tanner  [address] [id number]     Dear Colleague,    Thank you for referring your patient, Renate Tanner, to the [facility] [place] at [facility]. Please see a copy of my visit note below.    Colon and Rectal Surgery Clinic Note    RE: Renate Tanner.  : 1950.  SARAH: 2025.    Reason for visit: Hemorrhoids.    HPI:  74-year-old female with recent diagnosis of giant cell arteritis on extended steroid taper (currently on 6 mg daily) and past medical history significant for coronary artery disease, A-fib with RVR with pacemaker in place, on Xarelto who presents with symptoms of anal protrusion and intermittent bleeding associated with perianal swelling.  Having soft and formed bowel movements and denies constipation or straining but does spend quite a bit of time in the bathroom on her phone playing solitary.  She does have to manually reduce the internal hemorrhoids when they prolapse but this is quite easy. One previous vaginal delivery with obstetric care.  Denies fecal incontinence.  Follows with Dr. Hong for this. Plan for Actemra after additional workup is complete.   PET CT 6/10/25:  IMPRESSION:   1. Mild circumferential hypermetabolic activity involving the thoracic  aorta, suggesting inflammation and thus could be compatible with  clinically suspected giant cell arteritis.  2. Mild diffuse esophageal FDG uptake uptake, possibly related to  esophagitis. Recommend correlation for symptoms.  3. Focal uptake in the anal canal while typically physiologic or  benign such as a hemorrhoid, cannot rule out an underlying pathology.    Colonoscopy 23 by Dr. Felipe Castañeda:    CT Liver 25:  IMPRESSION:  1.  1.8 cm lobulated simple cyst segment Efrem. No suspicious hepatic lesions.  Is scheduled for an EGD in 3 weeks.    Medical history:  Past Medical History:    Diagnosis Date     Arrhythmia      Pacemaker        Surgical history:  Past Surgical History:   Procedure Laterality Date     ANESTHESIA CARDIOVERSION N/A 8/25/2024    Procedure: Anesthesia cardioversion;  Surgeon: GENERIC ANESTHESIA PROVIDER;  Location: [facility] OR     BIOPSY ARTERY TEMPORAL Bilateral 8/26/2024    Procedure: BIOPSY, ARTERY, TEMPORAL;  Surgeon: Young French MD;  Location: [facility] OR     INJECT EPIDURAL CAUDAL N/A 9/5/2023    Procedure: INJECTION, EPIDURAL, Caudal Racz Catheter;  Surgeon: Juan Pablo Broderick MD;  Location: [facility] OR     IR TRANSLAMINAR EPIDURAL LUMBAR INJ INCL IMAGING  12/26/2018       Family history:  Family History   Problem Relation Age of Onset     Ovarian Cancer Sister        Medications:  Current Outpatient Medications   Medication Sig Dispense Refill     alendronate (FOSAMAX) 70 MG tablet Take 70 mg by mouth every 7 days.       ascorbic acid (VITAMIN C) 1000 MG TABS Take 1,000 mg by mouth daily       Biotin 5000 MCG TABS Take 3 tablets by mouth daily.       Calcium Carbonate-Vit D-Min (CALCIUM 600+D PLUS MINERALS PO) Take 1 tablet by mouth daily       carboxymethylcellulose PF (REFRESH PLUS) 0.5 % ophthalmic solution Place 1 drop into both eyes 4 times daily as needed for dry eyes.       fexofenadine (ALLEGRA ALLERGY) 180 MG tablet Take 180 mg by mouth daily       fish oil-omega-3 fatty acids 1000 MG capsule Take 1 g by mouth daily.       furosemide (LASIX) 20 MG tablet Take 20 mg by mouth daily       GLUCOSAMINE-CHONDROITIN PO Take 1 tablet by mouth daily. 1500-1200mg       HEMP OIL OR EXTRACT OR OTHER CBD CANNABINOID, NOT MEDICAL CANNABIS, Take 1 capsule by mouth every evening. CBD capsule       HYDROcodone-acetaminophen (NORCO) 5-325 MG tablet Take 0.5-1 tablets by mouth daily as needed for breakthrough pain.       Multiple Vitamins-Minerals (PRESERVISION AREDS 2) CAPS Take 1 capsule by mouth 2 times daily.       multivitamin, therapeutic (THERA-VIT) TABS tablet Take 1  tablet by mouth daily.       predniSONE (DELTASONE) 1 MG tablet Combine with prednisone 1 mg tablets to be used as follows continue prednisone 6 mg daily as instructed 200 tablet 2     predniSONE (DELTASONE) 5 MG tablet Combine with prednisone 1 mg daily to be used as follows 60 tablet 1     rivaroxaban ANTICOAGULANT (XARELTO) 20 MG TABS tablet Take 20 mg by mouth daily.       rosuvastatin (CRESTOR) 20 MG tablet Take 20 mg by mouth every evening.       traZODone (DESYREL) 50 MG tablet Take 100 mg by mouth at bedtime.         Allergies:  Allergies   Allergen Reactions     Seasonal Allergies        Social history:   Social History     Tobacco Use     Smoking status: Never     Smokeless tobacco: Never   Substance Use Topics     Alcohol use: Yes     Comment: 2 drinks per week     Marital status: .    Physical Examination:  /74 (BP Location: Left arm, Patient Position: Sitting, Cuff Size: Adult Regular)   Pulse 80   Ht 1.524 m (5')   Wt 75.5 kg (166 lb 6.4 oz)   SpO2 98%   BMI 32.50 kg/m    General: Well hydrated. No acute distress.  Perianal external examination:  Perianal skin: Moist.  Lesions: No.  Eversion of buttocks: There was evidence of an anal fissure. Details: Posterior midline, epithelialized.  Skin tags or external hemorrhoids: Yes: Mild to moderate external component, mainly at the right anterior aspect.  She does have a right posterior and left lateral internal components that prolapse but easily reduce.  Digital rectal examination: Was performed.   Sphincter tone: Fair.  Palpable lesions: No.  Other: A small  rectocele was appreciated on bearing down.  Bimanual examination: was not performed.    Anoscopy: Was performed.   Hemorrhoids: Yes.  Internal hemorrhoids at the right posterior and left lateral positions with no evidence of active bleeding or thrombosis  Lesions: No    ASSESSMENT  74-year-old female with complex past medical history who presents with symptomatic hemorrhoids, mainly  internal component.  She is spending quite a bit of time in the bathroom.  No overwhelming constipation.  Xarelto may be a factor with the intermittent bleeding. Risks, benefits, and alternatives of operative treatment were thoroughly discussed with the patient, he/she understands these well and agrees to proceed.    PLAN  Would not recommend hemorrhoidectomy at this time given minor external component and extensive past medical history.  Unfortunately she is not a candidate for banding because of her anticoagulation.    High-fiber diet.  Start Metamucil 3 times daily.  Calmoseptine as needed for perianal skin irritation.    If hemorrhoid symptoms do not improve or worsen, return to clinic to further discuss THD.    30 minutes spent on the date of encounter performing chart review, history and exam, documentation and further activities as noted above with an additional 10 minutes for anoscopy.     Jarrod Temple MD, MSc, FACS, FASCRS.  Professor and Chief, Division of Colon & Rectal Surgery  Stanley M. Goldberg, MD Endowed Chair, Colon & Rectal Surgery  Department of Surgery, [facility]      Referring Provider:  Referred Self, MD  No address on file     Primary Care Provider:  Sky Samaniego    Again, thank you for allowing me to participate in the care of your patient.      Sincerely,    Jarrod Temple MD

## 2025-07-28 NOTE — PATIENT INSTRUCTIONS
Start a daily fiber supplement such as Citrucel or Metamucil POWDER. Start with 2 tablespoons daily and slowly increase up to three times a day, if needed, over the next 4-6 weeks. If you are taking the fiber supplement three times a day that means you are taking 2 tablespoons three times a day (total 6 tablespoons daily).   No prolonged sitting on the toilet  3.  Could consider transanal hemorrhoidal dearterialization (THD) if significantly symptomatic

## 2025-08-01 ENCOUNTER — TELEPHONE (OUTPATIENT)
Dept: GASTROENTEROLOGY | Facility: CLINIC | Age: 75
End: 2025-08-01
Payer: COMMERCIAL

## 2025-08-19 ENCOUNTER — HOSPITAL ENCOUNTER (OUTPATIENT)
Facility: CLINIC | Age: 75
Discharge: HOME | End: 2025-08-19
Attending: INTERNAL MEDICINE | Admitting: INTERNAL MEDICINE
Payer: COMMERCIAL

## 2025-08-19 ENCOUNTER — ANESTHESIA (OUTPATIENT)
Dept: GASTROENTEROLOGY | Facility: CLINIC | Age: 75
End: 2025-08-19
Payer: COMMERCIAL

## 2025-08-19 ENCOUNTER — ANESTHESIA EVENT (OUTPATIENT)
Dept: GASTROENTEROLOGY | Facility: CLINIC | Age: 75
End: 2025-08-19
Payer: COMMERCIAL

## 2025-08-19 VITALS
SYSTOLIC BLOOD PRESSURE: 111 MMHG | OXYGEN SATURATION: 99 % | DIASTOLIC BLOOD PRESSURE: 67 MMHG | RESPIRATION RATE: 32 BRPM | HEART RATE: 63 BPM

## 2025-08-19 LAB — UPPER GI ENDOSCOPY: NORMAL

## 2025-08-19 PROCEDURE — 258N000003 HC RX IP 258 OP 636: Performed by: NURSE ANESTHETIST, CERTIFIED REGISTERED

## 2025-08-19 PROCEDURE — 88305 TISSUE EXAM BY PATHOLOGIST: CPT | Mod: TC | Performed by: INTERNAL MEDICINE

## 2025-08-19 PROCEDURE — 250N000011 HC RX IP 250 OP 636: Performed by: NURSE ANESTHETIST, CERTIFIED REGISTERED

## 2025-08-19 PROCEDURE — 43239 EGD BIOPSY SINGLE/MULTIPLE: CPT | Performed by: INTERNAL MEDICINE

## 2025-08-19 PROCEDURE — 250N000009 HC RX 250: Performed by: NURSE ANESTHETIST, CERTIFIED REGISTERED

## 2025-08-19 PROCEDURE — 370N000017 HC ANESTHESIA TECHNICAL FEE, PER MIN: Performed by: INTERNAL MEDICINE

## 2025-08-19 PROCEDURE — 999N000010 HC STATISTIC ANES STAT CODE-CRNA PER MINUTE: Performed by: INTERNAL MEDICINE

## 2025-08-19 RX ORDER — METOPROLOL TARTRATE 25 MG/1
25 TABLET, FILM COATED ORAL 2 TIMES DAILY
COMMUNITY

## 2025-08-19 RX ORDER — LIDOCAINE HYDROCHLORIDE 20 MG/ML
INJECTION, SOLUTION INFILTRATION; PERINEURAL PRN
Status: DISCONTINUED | OUTPATIENT
Start: 2025-08-19 | End: 2025-08-19

## 2025-08-19 RX ORDER — OMEPRAZOLE 10 MG/1
CAPSULE, DELAYED RELEASE ORAL
COMMUNITY

## 2025-08-19 RX ORDER — SODIUM CHLORIDE, SODIUM LACTATE, POTASSIUM CHLORIDE, CALCIUM CHLORIDE 600; 310; 30; 20 MG/100ML; MG/100ML; MG/100ML; MG/100ML
INJECTION, SOLUTION INTRAVENOUS CONTINUOUS PRN
Status: DISCONTINUED | OUTPATIENT
Start: 2025-08-19 | End: 2025-08-19

## 2025-08-19 RX ORDER — VALACYCLOVIR HYDROCHLORIDE 1 G/1
TABLET, FILM COATED ORAL
COMMUNITY
Start: 2025-02-11

## 2025-08-19 RX ORDER — PROPOFOL 10 MG/ML
INJECTION, EMULSION INTRAVENOUS CONTINUOUS PRN
Status: DISCONTINUED | OUTPATIENT
Start: 2025-08-19 | End: 2025-08-19

## 2025-08-19 RX ADMIN — PROPOFOL 150 MCG/KG/MIN: 10 INJECTION, EMULSION INTRAVENOUS at 13:54

## 2025-08-19 RX ADMIN — LIDOCAINE HYDROCHLORIDE 40 MG: 20 INJECTION, SOLUTION INFILTRATION; PERINEURAL at 13:55

## 2025-08-19 RX ADMIN — SODIUM CHLORIDE, SODIUM LACTATE, POTASSIUM CHLORIDE, AND CALCIUM CHLORIDE: .6; .31; .03; .02 INJECTION, SOLUTION INTRAVENOUS at 13:50

## 2025-08-19 ASSESSMENT — ENCOUNTER SYMPTOMS: DYSRHYTHMIAS: 1

## 2025-08-19 ASSESSMENT — PAIN SCALES - GENERAL: PAINLEVEL_OUTOF10: MODERATE PAIN (6)

## 2025-08-19 ASSESSMENT — ACTIVITIES OF DAILY LIVING (ADL)
ADLS_ACUITY_SCORE: 56
ADLS_ACUITY_SCORE: 56

## 2025-08-20 LAB
PATH REPORT.COMMENTS IMP SPEC: NORMAL
PATH REPORT.COMMENTS IMP SPEC: NORMAL
PATH REPORT.FINAL DX SPEC: NORMAL
PATH REPORT.GROSS SPEC: NORMAL
PATH REPORT.MICROSCOPIC SPEC OTHER STN: NORMAL
PATH REPORT.RELEVANT HX SPEC: NORMAL
PHOTO IMAGE: NORMAL

## 2025-08-20 PROCEDURE — 88305 TISSUE EXAM BY PATHOLOGIST: CPT | Mod: 26 | Performed by: PATHOLOGY

## 2025-08-21 DIAGNOSIS — M31.6 GCA (GIANT CELL ARTERITIS) (H): Primary | ICD-10-CM

## 2025-08-21 RX ORDER — TOCILIZUMAB 180 MG/ML
162 INJECTION, SOLUTION SUBCUTANEOUS
Qty: 3.6 ML | Refills: 5 | OUTPATIENT
Start: 2025-08-21

## 2025-08-25 ENCOUNTER — VIRTUAL VISIT (OUTPATIENT)
Dept: GASTROENTEROLOGY | Facility: CLINIC | Age: 75
End: 2025-08-25
Attending: STUDENT IN AN ORGANIZED HEALTH CARE EDUCATION/TRAINING PROGRAM
Payer: COMMERCIAL

## 2025-08-25 VITALS
BODY MASS INDEX: 32.39 KG/M2 | WEIGHT: 165 LBS | SYSTOLIC BLOOD PRESSURE: 111 MMHG | HEIGHT: 60 IN | DIASTOLIC BLOOD PRESSURE: 67 MMHG

## 2025-08-25 DIAGNOSIS — K20.90 ESOPHAGITIS: ICD-10-CM

## 2025-08-25 PROCEDURE — 1125F AMNT PAIN NOTED PAIN PRSNT: CPT | Mod: 95 | Performed by: PHYSICIAN ASSISTANT

## 2025-08-25 PROCEDURE — 3078F DIAST BP <80 MM HG: CPT | Mod: 95 | Performed by: PHYSICIAN ASSISTANT

## 2025-08-25 PROCEDURE — 98001 SYNCH AUDIO-VIDEO NEW LOW 30: CPT | Performed by: PHYSICIAN ASSISTANT

## 2025-08-25 PROCEDURE — 3074F SYST BP LT 130 MM HG: CPT | Mod: 95 | Performed by: PHYSICIAN ASSISTANT

## 2025-08-26 ENCOUNTER — VIRTUAL VISIT (OUTPATIENT)
Dept: PHARMACY | Facility: CLINIC | Age: 75
End: 2025-08-26
Attending: STUDENT IN AN ORGANIZED HEALTH CARE EDUCATION/TRAINING PROGRAM
Payer: COMMERCIAL

## 2025-08-26 DIAGNOSIS — I50.30 HEART FAILURE WITH PRESERVED EJECTION FRACTION, NYHA CLASS I (H): ICD-10-CM

## 2025-08-26 DIAGNOSIS — M85.80 OSTEOPENIA: ICD-10-CM

## 2025-08-26 DIAGNOSIS — M54.50 LOWER BACK PAIN: ICD-10-CM

## 2025-08-26 DIAGNOSIS — T78.40XA ALLERGIES: ICD-10-CM

## 2025-08-26 DIAGNOSIS — Z71.85 VACCINE COUNSELING: ICD-10-CM

## 2025-08-26 DIAGNOSIS — M31.6 GCA (GIANT CELL ARTERITIS) (H): Primary | ICD-10-CM

## 2025-08-26 DIAGNOSIS — K59.00 CONSTIPATION: ICD-10-CM

## 2025-08-26 DIAGNOSIS — G47.00 INSOMNIA: ICD-10-CM

## 2025-08-26 DIAGNOSIS — Z78.9 TAKES DIETARY SUPPLEMENTS: ICD-10-CM

## 2025-08-26 DIAGNOSIS — E78.5 HYPERLIPIDEMIA LDL GOAL <100: ICD-10-CM

## 2025-08-26 DIAGNOSIS — K21.00 GASTROESOPHAGEAL REFLUX DISEASE WITH ESOPHAGITIS: ICD-10-CM

## 2025-08-26 DIAGNOSIS — B00.9 HSV (HERPES SIMPLEX VIRUS) INFECTION: ICD-10-CM

## 2025-08-26 RX ORDER — FERROUS SULFATE 325(65) MG
325 TABLET ORAL
COMMUNITY

## (undated) DEVICE — SOL WATER IRRIG 1000ML BOTTLE 2F7114

## (undated) DEVICE — PACK MINOR SBA15MIFSE

## (undated) DEVICE — SU SILK 4-0 TIE 24" SA73H

## (undated) DEVICE — SPONGE BALL KERLIX ROUND XL W/O STRING LATEX 4935

## (undated) DEVICE — GLOVE ESTEEM POWDER FREE 8.0  2D72PL80

## (undated) DEVICE — SU VICRYL+ 5-0 P-3 18IN UND VCP493G

## (undated) DEVICE — NDL 27GA 1.25" 305136

## (undated) DEVICE — ESU GROUND PAD UNIVERSAL W/O CORD

## (undated) DEVICE — DRSG STERI STRIP 1/2X4" R1547

## (undated) DEVICE — SU VICRYL 3-0 SH 27" J316H

## (undated) DEVICE — DRAPE MINOR PROCEDURE LAP 29496

## (undated) DEVICE — NDL 19GA 1.5"

## (undated) DEVICE — TRAY PAIN INJECTION 97A 640

## (undated) DEVICE — ESU PENCIL W/SMOKE EVAC NEPTUNE STRYKER 0703-046-000

## (undated) DEVICE — GLOVE BIOGEL PI ULTRATOUCH SZ 7.5 41175

## (undated) DEVICE — DECANTER BAG 2002S

## (undated) DEVICE — ESU ELEC NDL 1" E1552

## (undated) DEVICE — POUCH STERILIZATION 12.5X6" SELF SEAL CHEM IND

## (undated) DEVICE — DECANTER VIAL 2006S

## (undated) DEVICE — PREP CHLORAPREP W/ORANGE TINT 10.5ML 260715

## (undated) DEVICE — SU VICRYL 5-0 P-3 18" UND J493H

## (undated) DEVICE — LINEN TOWEL PACK X5 5464

## (undated) DEVICE — SU DERMABOND MINI DHVM12

## (undated) DEVICE — DRSG TELFA 2X3"

## (undated) RX ORDER — LIDOCAINE HYDROCHLORIDE 10 MG/ML
INJECTION, SOLUTION EPIDURAL; INFILTRATION; INTRACAUDAL; PERINEURAL
Status: DISPENSED
Start: 2019-07-09

## (undated) RX ORDER — METHYLPREDNISOLONE ACETATE 80 MG/ML
INJECTION, SUSPENSION INTRA-ARTICULAR; INTRALESIONAL; INTRAMUSCULAR; SOFT TISSUE
Status: DISPENSED
Start: 2018-11-09

## (undated) RX ORDER — LIDOCAINE HYDROCHLORIDE 5 MG/ML
INJECTION, SOLUTION INFILTRATION; INTRAVENOUS
Status: DISPENSED
Start: 2023-10-09

## (undated) RX ORDER — BUPIVACAINE HYDROCHLORIDE 5 MG/ML
INJECTION, SOLUTION EPIDURAL; INTRACAUDAL
Status: DISPENSED
Start: 2018-12-26

## (undated) RX ORDER — TRIAMCINOLONE ACETONIDE 40 MG/ML
INJECTION, SUSPENSION INTRA-ARTICULAR; INTRAMUSCULAR
Status: DISPENSED
Start: 2019-07-09

## (undated) RX ORDER — LIDOCAINE HYDROCHLORIDE 10 MG/ML
INJECTION, SOLUTION EPIDURAL; INFILTRATION; INTRACAUDAL; PERINEURAL
Status: DISPENSED
Start: 2018-11-09

## (undated) RX ORDER — BUPIVACAINE HYDROCHLORIDE 5 MG/ML
INJECTION, SOLUTION EPIDURAL; INTRACAUDAL
Status: DISPENSED
Start: 2024-08-26

## (undated) RX ORDER — BUPIVACAINE HYDROCHLORIDE 2.5 MG/ML
INJECTION, SOLUTION EPIDURAL; INFILTRATION; INTRACAUDAL
Status: DISPENSED
Start: 2019-07-09

## (undated) RX ORDER — METHYLPREDNISOLONE ACETATE 80 MG/ML
INJECTION, SUSPENSION INTRA-ARTICULAR; INTRALESIONAL; INTRAMUSCULAR; SOFT TISSUE
Status: DISPENSED
Start: 2018-12-26

## (undated) RX ORDER — LIDOCAINE HYDROCHLORIDE 10 MG/ML
INJECTION, SOLUTION EPIDURAL; INFILTRATION; INTRACAUDAL; PERINEURAL
Status: DISPENSED
Start: 2018-12-26

## (undated) RX ORDER — TRIAMCINOLONE ACETONIDE 40 MG/ML
INJECTION, SUSPENSION INTRA-ARTICULAR; INTRAMUSCULAR
Status: DISPENSED
Start: 2023-10-09

## (undated) RX ORDER — LIDOCAINE HYDROCHLORIDE 20 MG/ML
INJECTION, SOLUTION INFILTRATION; PERINEURAL
Status: DISPENSED
Start: 2024-08-26

## (undated) RX ORDER — BUPIVACAINE HYDROCHLORIDE 5 MG/ML
INJECTION, SOLUTION EPIDURAL; INTRACAUDAL
Status: DISPENSED
Start: 2018-11-09